# Patient Record
Sex: FEMALE | Race: OTHER | HISPANIC OR LATINO | URBAN - METROPOLITAN AREA
[De-identification: names, ages, dates, MRNs, and addresses within clinical notes are randomized per-mention and may not be internally consistent; named-entity substitution may affect disease eponyms.]

---

## 2018-10-04 ENCOUNTER — OFFICE VISIT (OUTPATIENT)
Dept: FAMILY MEDICINE CLINIC | Facility: CLINIC | Age: 33
End: 2018-10-04
Payer: COMMERCIAL

## 2018-10-04 VITALS
SYSTOLIC BLOOD PRESSURE: 140 MMHG | BODY MASS INDEX: 26.24 KG/M2 | HEART RATE: 80 BPM | DIASTOLIC BLOOD PRESSURE: 94 MMHG | TEMPERATURE: 99.1 F | HEIGHT: 61 IN | WEIGHT: 139 LBS

## 2018-10-04 DIAGNOSIS — Z00.00 WELL ADULT EXAM: Primary | ICD-10-CM

## 2018-10-04 DIAGNOSIS — Z13.6 SCREENING FOR CARDIOVASCULAR CONDITION: ICD-10-CM

## 2018-10-04 DIAGNOSIS — Z13.1 SCREENING FOR DIABETES MELLITUS: ICD-10-CM

## 2018-10-04 DIAGNOSIS — I10 ESSENTIAL HYPERTENSION: ICD-10-CM

## 2018-10-04 DIAGNOSIS — Z23 NEED FOR IMMUNIZATION AGAINST INFLUENZA: ICD-10-CM

## 2018-10-04 PROCEDURE — 99385 PREV VISIT NEW AGE 18-39: CPT | Performed by: NURSE PRACTITIONER

## 2018-10-04 RX ORDER — HYDROCHLOROTHIAZIDE 50 MG/1
50 TABLET ORAL DAILY
COMMUNITY
End: 2018-10-04 | Stop reason: CLARIF

## 2018-10-04 RX ORDER — LOSARTAN POTASSIUM 100 MG/1
100 TABLET ORAL DAILY
COMMUNITY
End: 2018-10-04 | Stop reason: CLARIF

## 2018-10-04 RX ORDER — LOSARTAN POTASSIUM AND HYDROCHLOROTHIAZIDE 25; 100 MG/1; MG/1
1 TABLET ORAL DAILY
Qty: 30 TABLET | Refills: 2 | Status: SHIPPED | OUTPATIENT
Start: 2018-10-04 | End: 2019-03-29 | Stop reason: SDUPTHER

## 2018-10-04 NOTE — PATIENT INSTRUCTIONS

## 2018-10-05 NOTE — PROGRESS NOTES
Assessment/Plan   Diagnoses and all orders for this visit:    Well adult exam    Essential hypertension  -     losartan-hydrochlorothiazide (HYZAAR) 100-25 MG per tablet; Take 1 tablet by mouth daily  -     Comprehensive metabolic panel; Future  -     UA (URINE) with reflex to Microscopic  -     Comprehensive metabolic panel    Screening for cardiovascular condition  -     CBC and differential; Future  -     Lipid Panel with Direct LDL reflex; Future  -     CBC and differential  -     Lipid Panel with Direct LDL reflex    Screening for diabetes mellitus  -     Hemoglobin A1C With EAG; Future  -     Hemoglobin A1C With EAG    Need for immunization against influenza  -     SYRINGE/SINGLE-DOSE VIAL: influenza vaccine, [id number], quadrivalent, 0 5 mL, preservative-free, for patients 3+ yr (FLUZONE)    Other orders  -     Discontinue: hydrochlorothiazide (HYDRODIURIL) 50 mg tablet; Take 50 mg by mouth daily  -     Discontinue: losartan (COZAAR) 100 MG tablet; Take 100 mg by mouth daily        Chief Complaint   Patient presents with    Well Check     New patient well check 28year old  Subjective   Patient ID: Belvin Leyden is a 28 y o  female  Vitals:    10/04/18 0920   BP: 140/94   Pulse: 80   Temp: 99 1 °F (37 3 °C)     Bernard Michele is here today to establish care and have a preventive well visit  She has a history of hypertension and is currently on losartan and HCTZ 50 mg which she admits that she often times forgets to take the 2 pills  Today will combine losartan 100 / 25 mg HCTZ ( HYZARR)  She reports she eats a healthy diet and exercises regularly with her   Does not currently see a dentist new to the area and will be obtaining 1 shortly  She has had a tubal ligation and reports no difficulties with her menstrual period currently  She has no health issues to discuss today  She was offered influenza immunization but she deferred    Will obtain preventive labs, and evaluate need for any additional medications  The following portions of the patient's history were reviewed and updated as appropriate: allergies, current medications, past family history, past medical history, past surgical history and problem list     Review of Systems   Constitutional: Negative  Negative for chills, fatigue and fever  HENT: Negative  Negative for congestion, sinus pressure and sore throat  Eyes: Negative  Respiratory: Negative  Negative for cough, chest tightness and shortness of breath  Cardiovascular: Negative  Negative for chest pain, palpitations and leg swelling  Gastrointestinal: Negative  Negative for blood in stool, constipation and diarrhea  Endocrine: Negative  Genitourinary: Negative  Musculoskeletal: Negative  Negative for arthralgias and myalgias  Skin: Negative  Allergic/Immunologic: Positive for environmental allergies  Neurological: Negative  Negative for dizziness and headaches  Hematological: Negative  Psychiatric/Behavioral: Negative  Objective     Physical Exam   Constitutional: She is oriented to person, place, and time  She appears well-developed and well-nourished  No distress  HENT:   Head: Normocephalic and atraumatic  Right Ear: External ear normal    Left Ear: External ear normal    Nose: Nose normal    Mouth/Throat: Oropharynx is clear and moist  No oropharyngeal exudate  Eyes: Pupils are equal, round, and reactive to light  Conjunctivae and EOM are normal  Right eye exhibits no discharge  Left eye exhibits no discharge  Neck: Normal range of motion  Neck supple  No thyromegaly present  Cardiovascular: Normal rate, regular rhythm, normal heart sounds and intact distal pulses  No murmur heard  Pulmonary/Chest: Effort normal and breath sounds normal  No respiratory distress  She has no wheezes  She has no rales  Abdominal: Soft  Bowel sounds are normal  She exhibits no distension and no mass  There is no tenderness   There is no rebound and no guarding  Musculoskeletal: Normal range of motion  She exhibits no edema or tenderness  Lymphadenopathy:     She has no cervical adenopathy  Neurological: She is alert and oriented to person, place, and time  She displays normal reflexes  No cranial nerve deficit  She exhibits normal muscle tone  Coordination normal    Skin: Skin is warm and dry  Capillary refill takes less than 2 seconds  No rash noted  She is not diaphoretic  No erythema  Psychiatric: She has a normal mood and affect  Her behavior is normal  Judgment and thought content normal    Nursing note and vitals reviewed

## 2018-10-12 ENCOUNTER — TELEPHONE (OUTPATIENT)
Dept: FAMILY MEDICINE CLINIC | Facility: CLINIC | Age: 33
End: 2018-10-12

## 2018-10-12 ENCOUNTER — OFFICE VISIT (OUTPATIENT)
Dept: URGENT CARE | Facility: CLINIC | Age: 33
End: 2018-10-12
Payer: COMMERCIAL

## 2018-10-12 VITALS
SYSTOLIC BLOOD PRESSURE: 128 MMHG | TEMPERATURE: 97.9 F | HEIGHT: 60 IN | BODY MASS INDEX: 27.29 KG/M2 | WEIGHT: 139 LBS | DIASTOLIC BLOOD PRESSURE: 84 MMHG | HEART RATE: 88 BPM | RESPIRATION RATE: 18 BRPM | OXYGEN SATURATION: 98 %

## 2018-10-12 DIAGNOSIS — J02.9 PHARYNGITIS, UNSPECIFIED ETIOLOGY: Primary | ICD-10-CM

## 2018-10-12 LAB — S PYO AG THROAT QL: NEGATIVE

## 2018-10-12 PROCEDURE — 99213 OFFICE O/P EST LOW 20 MIN: CPT | Performed by: PHYSICIAN ASSISTANT

## 2018-10-12 PROCEDURE — 87070 CULTURE OTHR SPECIMN AEROBIC: CPT | Performed by: PHYSICIAN ASSISTANT

## 2018-10-12 NOTE — PATIENT INSTRUCTIONS
Rapid strep negative, will send for culture and call if positive  Likely viral etiology given associated symptoms  Salt water gargles, chloraseptic spray, tylenol, and motrin for sore throat  Flonase and sudafed for sinus congestion  Delsym or robotussin for cough  Watch for any fevers  Follow up with your PCP for persistent symptoms  Go to the ER for any distress

## 2018-10-12 NOTE — PROGRESS NOTES
[facility]        NAME: Manjula Umanzor is a 28 y o  female  : 1985    MRN: [id number]  DATE: 2018  TIME: 6:41 PM    Assessment and Plan   Pharyngitis, unspecified etiology [J02 9]  1  Pharyngitis, unspecified etiology  POCT rapid strepA    Throat culture         Patient Instructions     Rapid strep negative, will send for culture and call if positive  Likely viral etiology given associated symptoms  Salt water gargles, chloraseptic spray, tylenol, and motrin for sore throat  Flonase and sudafed for sinus congestion  Delsym or robotussin for cough  Watch for any fevers  Follow up with PCP in 3-5 days  Proceed to  ER if symptoms worsen  Chief Complaint     Chief Complaint   Patient presents with    Sore Throat     Pt reports a sore throat that began on Monday  History of Present Illness       This is a 28year old female presenting for URI symptoms x 4 days  She reports cough, sinus congestion, right ear pain, and sore throat that is worsening  + subjective fevers  She called her PCP who told her that she needed to be swabbed for strep, so she came here  She has been using ibuprofen which does help the pain  No abdominal pain, nausea, vomiting, diarrhea  She does not speak fluent [place], though she does speak some  Her  is here who helps fill in the gaps in communication  They refused [facility]   Review of Systems   Review of Systems   Constitutional: Positive for chills, fatigue and fever (subjective)  HENT: Positive for congestion, ear pain, rhinorrhea, sinus pressure and sore throat  Negative for ear discharge  Respiratory: Positive for cough  Negative for shortness of breath  Gastrointestinal: Negative for abdominal pain, diarrhea, nausea and vomiting  Skin: Negative for rash           Current Medications       Current Outpatient Prescriptions:     losartan-hydrochlorothiazide (HYZAAR) 100-25 MG per tablet, Take 1 tablet by mouth daily, Disp: 30 tablet, Rfl: 2    Current Allergies     Allergies as of 10/12/2018 - Reviewed 10/12/2018   Allergen Reaction Noted    Amoxicillin Rash 10/12/2018            The following portions of the patient's history were reviewed and updated as appropriate: allergies, current medications, past family history, past medical history, past social history, past surgical history and problem list      Past Medical History:   Diagnosis Date    Hypertension        Past Surgical History:   Procedure Laterality Date     SECTION         No family history on file  Medications have been verified  Objective   /84   Pulse 88   Temp 97 9 °F (36 6 °C)   Resp 18   Ht 5' (1 524 m)   Wt 63 kg (139 lb)   LMP 10/04/2018   SpO2 98%   BMI 27 15 kg/m²        Physical Exam     Physical Exam   Constitutional: She appears well-developed and well-nourished  No distress  HENT:   Head: Normocephalic and atraumatic  Right Ear: Tympanic membrane, external ear and ear canal normal    Left Ear: Tympanic membrane, external ear and ear canal normal    Nose: Nose normal    Mouth/Throat: Uvula is midline and mucous membranes are normal  Posterior oropharyngeal edema (mild) and posterior oropharyngeal erythema present  No oropharyngeal exudate  Eyes: Pupils are equal, round, and reactive to light  Conjunctivae are normal    Neck: Normal range of motion  Neck supple  Cardiovascular: Normal rate, regular rhythm and normal heart sounds  Pulmonary/Chest: Effort normal and breath sounds normal  No respiratory distress  She has no decreased breath sounds  She has no wheezes  She has no rhonchi  She has no rales  Lymphadenopathy:     She has cervical adenopathy  Neurological: She is alert  Skin: Skin is warm and dry  She is not diaphoretic  Nursing note and vitals reviewed

## 2018-10-12 NOTE — TELEPHONE ENCOUNTER
I spoke with the patient's , Terrie Melendez, 10/12/2018 at 5:53 p m  after he contacted the answering service  The patient has been sick since 10/8/2018 with a sore throat and trouble swallowing  She can now barely talk and is a lot of pain  She is running low-grade fevers  She does have some runny nose and a cough  There is no nausea, vomiting, or diarrhea  I advised at this point since our office is currently closed that he should take [facility] to [facility] for further evaluation  He will take her there directly

## 2018-10-15 LAB — BACTERIA THROAT CULT: NORMAL

## 2018-11-10 LAB
ALBUMIN SERPL-MCNC: 4.6 G/DL (ref 3.6–5.1)
ALBUMIN/GLOB SERPL: 1.5 (CALC) (ref 1–2.5)
ALP SERPL-CCNC: 48 U/L (ref 33–115)
ALT SERPL-CCNC: 12 U/L (ref 6–29)
AST SERPL-CCNC: 14 U/L (ref 10–30)
BASOPHILS # BLD AUTO: 17 CELLS/UL (ref 0–200)
BASOPHILS NFR BLD AUTO: 0.2 %
BILIRUB SERPL-MCNC: 0.4 MG/DL (ref 0.2–1.2)
BUN SERPL-MCNC: 17 MG/DL (ref 7–25)
BUN/CREAT SERPL: NORMAL (CALC) (ref 6–22)
CALCIUM SERPL-MCNC: 9.5 MG/DL (ref 8.6–10.2)
CHLORIDE SERPL-SCNC: 103 MMOL/L (ref 98–110)
CHOLEST SERPL-MCNC: 232 MG/DL
CHOLEST/HDLC SERPL: 4.8 (CALC)
CO2 SERPL-SCNC: 25 MMOL/L (ref 20–32)
CREAT SERPL-MCNC: 0.87 MG/DL (ref 0.5–1.1)
EOSINOPHIL # BLD AUTO: 266 CELLS/UL (ref 15–500)
EOSINOPHIL NFR BLD AUTO: 3.2 %
ERYTHROCYTE [DISTWIDTH] IN BLOOD BY AUTOMATED COUNT: 12.7 % (ref 11–15)
EST. AVERAGE GLUCOSE BLD GHB EST-MCNC: 108 (CALC)
EST. AVERAGE GLUCOSE BLD GHB EST-SCNC: 6 (CALC)
GLOBULIN SER CALC-MCNC: 3 G/DL (CALC) (ref 1.9–3.7)
GLUCOSE SERPL-MCNC: 90 MG/DL (ref 65–99)
HBA1C MFR BLD: 5.4 % OF TOTAL HGB
HCT VFR BLD AUTO: 38.9 % (ref 35–45)
HDLC SERPL-MCNC: 48 MG/DL
HGB BLD-MCNC: 13.3 G/DL (ref 11.7–15.5)
LDLC SERPL CALC-MCNC: 150 MG/DL (CALC)
LYMPHOCYTES # BLD AUTO: 4922 CELLS/UL (ref 850–3900)
LYMPHOCYTES NFR BLD AUTO: 59.3 %
MCH RBC QN AUTO: 30.4 PG (ref 27–33)
MCHC RBC AUTO-ENTMCNC: 34.2 G/DL (ref 32–36)
MCV RBC AUTO: 88.8 FL (ref 80–100)
MONOCYTES # BLD AUTO: 598 CELLS/UL (ref 200–950)
MONOCYTES NFR BLD AUTO: 7.2 %
NEUTROPHILS # BLD AUTO: 2498 CELLS/UL (ref 1500–7800)
NEUTROPHILS NFR BLD AUTO: 30.1 %
NONHDLC SERPL-MCNC: 184 MG/DL (CALC)
PLATELET # BLD AUTO: 380 THOUSAND/UL (ref 140–400)
PMV BLD REES-ECKER: 10.3 FL (ref 7.5–12.5)
POTASSIUM SERPL-SCNC: 4.3 MMOL/L (ref 3.5–5.3)
PROT SERPL-MCNC: 7.6 G/DL (ref 6.1–8.1)
RBC # BLD AUTO: 4.38 MILLION/UL (ref 3.8–5.1)
SL AMB EGFR AFRICAN AMERICAN: 101 ML/MIN/1.73M2
SL AMB EGFR NON AFRICAN AMERICAN: 88 ML/MIN/1.73M2
SODIUM SERPL-SCNC: 140 MMOL/L (ref 135–146)
TRIGL SERPL-MCNC: 204 MG/DL
WBC # BLD AUTO: 8.3 THOUSAND/UL (ref 3.8–10.8)

## 2018-11-21 ENCOUNTER — TELEPHONE (OUTPATIENT)
Dept: FAMILY MEDICINE CLINIC | Facility: CLINIC | Age: 33
End: 2018-11-21

## 2018-11-23 ENCOUNTER — OFFICE VISIT (OUTPATIENT)
Dept: FAMILY MEDICINE CLINIC | Facility: CLINIC | Age: 33
End: 2018-11-23
Payer: COMMERCIAL

## 2018-11-23 VITALS
HEIGHT: 60 IN | SYSTOLIC BLOOD PRESSURE: 120 MMHG | WEIGHT: 141 LBS | TEMPERATURE: 98.6 F | DIASTOLIC BLOOD PRESSURE: 82 MMHG | HEART RATE: 56 BPM | BODY MASS INDEX: 27.68 KG/M2

## 2018-11-23 DIAGNOSIS — R79.89 ABNORMAL CBC: Primary | ICD-10-CM

## 2018-11-23 DIAGNOSIS — E78.2 MIXED HYPERLIPIDEMIA: ICD-10-CM

## 2018-11-23 PROCEDURE — 99213 OFFICE O/P EST LOW 20 MIN: CPT | Performed by: NURSE PRACTITIONER

## 2018-11-23 PROCEDURE — 1036F TOBACCO NON-USER: CPT | Performed by: NURSE PRACTITIONER

## 2018-11-23 PROCEDURE — 3008F BODY MASS INDEX DOCD: CPT | Performed by: NURSE PRACTITIONER

## 2018-11-23 RX ORDER — ATORVASTATIN CALCIUM 10 MG/1
10 TABLET, FILM COATED ORAL DAILY
Qty: 30 TABLET | Refills: 1 | Status: SHIPPED | OUTPATIENT
Start: 2018-11-23 | End: 2019-03-14 | Stop reason: ALTCHOICE

## 2018-11-23 NOTE — PROGRESS NOTES
Assessment/Plan   Diagnoses and all orders for this visit:    Abnormal CBC  -     CBC and differential; Future  -     CBC and differential    Mixed hyperlipidemia  -     Lipid Panel with Direct LDL reflex; Future  -     Lipid Panel with Direct LDL reflex  -     atorvastatin (LIPITOR) 10 mg tablet; Take 1 tablet (10 mg total) by mouth daily  -     Comprehensive metabolic panel; Future  -     Comprehensive metabolic panel        Chief Complaint   Patient presents with    Follow-up     to discuss labs       Subjective   Patient ID: Sofy Neff is a 35 y o  female  Vitals:    11/23/18 1106   BP: 120/82   Pulse: 56   Temp: 98 6 °F (37 °C)     Patient here today to discuss labs  Elevated cholesterol,  And LDL of 150 will begin atorvastatin and retest in  3 months along with liver enzymes  Side effects of medications discussed  Appropriate time to take medication discussed  Slight elevation of lymphs on CBC, pt had viral infection at the time  Will recheck CBC in another 2 weeks and evaluate the need for further monitoring        The following portions of the patient's history were reviewed and updated as appropriate: allergies, current medications, past family history, past medical history, past surgical history and problem list     Review of Systems   Constitutional: Negative  Negative for fatigue and fever  HENT: Negative  Eyes: Negative  Respiratory: Negative  Cardiovascular: Negative  Gastrointestinal: Negative  Endocrine: Negative  Genitourinary: Negative  Musculoskeletal: Negative  Skin: Negative  Allergic/Immunologic: Negative  Neurological: Negative  Hematological: Negative  Psychiatric/Behavioral: Negative  Objective     Physical Exam   Constitutional: She is oriented to person, place, and time  She appears well-developed and well-nourished  No distress  HENT:   Head: Normocephalic and atraumatic     Right Ear: External ear normal    Left Ear: External ear normal    Nose: Nose normal    Mouth/Throat: Oropharynx is clear and moist  No oropharyngeal exudate  Eyes: Pupils are equal, round, and reactive to light  Conjunctivae are normal    Neck: Normal range of motion  Neck supple  No thyromegaly present  Cardiovascular: Normal rate and regular rhythm  Pulmonary/Chest: Effort normal and breath sounds normal    Abdominal: Soft  Bowel sounds are normal    Musculoskeletal: Normal range of motion  She exhibits no edema, tenderness or deformity  Lymphadenopathy:     She has no cervical adenopathy  Neurological: She is alert and oriented to person, place, and time  Skin: Skin is warm and dry  Capillary refill takes less than 2 seconds  She is not diaphoretic  Psychiatric: She has a normal mood and affect  Her behavior is normal  Judgment and thought content normal    Nursing note and vitals reviewed  Allergies   Allergen Reactions    Amoxicillin Rash     There is no problem list on file for this patient  Current Outpatient Prescriptions:     losartan-hydrochlorothiazide (HYZAAR) 100-25 MG per tablet, Take 1 tablet by mouth daily, Disp: 30 tablet, Rfl: 2    atorvastatin (LIPITOR) 10 mg tablet, Take 1 tablet (10 mg total) by mouth daily, Disp: 30 tablet, Rfl: 1  Social History     Social History    Marital status: /Civil Union     Spouse name: N/A    Number of children: N/A    Years of education: N/A     Occupational History    Not on file  Social History Main Topics    Smoking status: Former Smoker    Smokeless tobacco: Never Used    Alcohol use Yes      Comment: Social     Drug use: Unknown    Sexual activity: Not on file     Other Topics Concern    Not on file     Social History Narrative    Nonsmoker - as per [employer]      No family history on file

## 2018-11-23 NOTE — PATIENT INSTRUCTIONS
1  Repeat CBC next week  2  Repeat lipid profile in three months  3   Begin medication of atorvastatin in PM with dinner, call if any leg pain

## 2018-12-05 ENCOUNTER — TELEPHONE (OUTPATIENT)
Dept: FAMILY MEDICINE CLINIC | Facility: CLINIC | Age: 33
End: 2018-12-05

## 2018-12-05 DIAGNOSIS — D72.820 LYMPHOCYTOSIS: Primary | ICD-10-CM

## 2018-12-05 LAB
BASOPHILS # BLD AUTO: 38 CELLS/UL (ref 0–200)
BASOPHILS NFR BLD AUTO: 0.4 %
EOSINOPHIL # BLD AUTO: 228 CELLS/UL (ref 15–500)
EOSINOPHIL NFR BLD AUTO: 2.4 %
ERYTHROCYTE [DISTWIDTH] IN BLOOD BY AUTOMATED COUNT: 12.9 % (ref 11–15)
HCT VFR BLD AUTO: 37.2 % (ref 35–45)
HGB BLD-MCNC: 12.5 G/DL (ref 11.7–15.5)
LYMPHOCYTES # BLD AUTO: 4874 CELLS/UL (ref 850–3900)
LYMPHOCYTES NFR BLD AUTO: 51.3 %
MCH RBC QN AUTO: 30.9 PG (ref 27–33)
MCHC RBC AUTO-ENTMCNC: 33.6 G/DL (ref 32–36)
MCV RBC AUTO: 92.1 FL (ref 80–100)
MONOCYTES # BLD AUTO: 542 CELLS/UL (ref 200–950)
MONOCYTES NFR BLD AUTO: 5.7 %
NEUTROPHILS # BLD AUTO: 3819 CELLS/UL (ref 1500–7800)
NEUTROPHILS NFR BLD AUTO: 40.2 %
PLATELET # BLD AUTO: 385 THOUSAND/UL (ref 140–400)
PMV BLD REES-ECKER: 10.4 FL (ref 7.5–12.5)
RBC # BLD AUTO: 4.04 MILLION/UL (ref 3.8–5.1)
WBC # BLD AUTO: 9.5 THOUSAND/UL (ref 3.8–10.8)

## 2018-12-05 NOTE — PROGRESS NOTES
Patient has elevated lymphs on CBC,  Initial thought was viral infection due to sore throat but they remain elevated post infection   Sending to hematology for further evaluation

## 2018-12-24 ENCOUNTER — CONSULT (OUTPATIENT)
Dept: HEMATOLOGY ONCOLOGY | Facility: HOSPITAL | Age: 33
End: 2018-12-24
Payer: COMMERCIAL

## 2018-12-24 VITALS
OXYGEN SATURATION: 99 % | RESPIRATION RATE: 16 BRPM | SYSTOLIC BLOOD PRESSURE: 142 MMHG | HEART RATE: 92 BPM | DIASTOLIC BLOOD PRESSURE: 82 MMHG | TEMPERATURE: 98.1 F | HEIGHT: 61 IN | BODY MASS INDEX: 27 KG/M2 | WEIGHT: 143 LBS

## 2018-12-24 DIAGNOSIS — R10.12 LEFT UPPER QUADRANT PAIN: ICD-10-CM

## 2018-12-24 DIAGNOSIS — D72.820 LYMPHOCYTOSIS: Primary | ICD-10-CM

## 2018-12-24 PROCEDURE — 99245 OFF/OP CONSLTJ NEW/EST HI 55: CPT | Performed by: INTERNAL MEDICINE

## 2018-12-24 NOTE — PROGRESS NOTES
Oncology Outpatient Consult Note  Anjali Sharp 35 y o  female MRN: @ Encounter: [id number]        Date:  2018        CC:  Mild lymphocytosis with normal CBC      HPI:  Anjali Sharp is seen for initial consultation 2018 regarding Mild lymphocytosis  She has had 2 blood test done within a month of each other which showed a normal white count and normal differential apart from a slightly elevated lymphocyte count  The patient herself is at baseline  She did have a sore throat at the time of the first blood test She states her throat is better  Really denies any drenching night sweats, weight loss, nausea, vomiting  Denies any B symptoms  Has lost a little weight but this was intentional  She states she feels a slight nodularity in her left upper quadrant which is sometimes uncomfortable  It seems like nodularity in the subcutaneous fat  Denies any other complaints  The rest of her 14 point review of systems today was negative  Test Results:    Imaging: No results found  Labs:   Lab Results   Component Value Date    WBC 9 5 2018    HGB 12 5 2018    HCT 37 2 2018    MCV 92 1 2018     2018     Lab Results   Component Value Date    K 4 3 2018     2018    CO2 25 2018    BUN 17 2018    CALCIUM 9 5 2018    ALKPHOS 48 2018           ROS: As stated in history of present illness otherwise her 14 point review of systems today was negative  Active Problems: Lymphocytosis    Past Medical History:   Past Medical History:   Diagnosis Date    Hypertension        Surgical History:   Past Surgical History:   Procedure Laterality Date     SECTION         Family History:  Noncontributory        Social History:   Social History     Social History    Marital status: /Civil Union     Spouse name: N/A    Number of children: N/A    Years of education: N/A     Occupational History    Not on file       Social History Main Topics    Smoking status: Former Smoker    Smokeless tobacco: Never Used    Alcohol use Yes      Comment: Social     Drug use: Unknown    Sexual activity: Not on file     Other Topics Concern    Not on file     Social History Narrative    Nonsmoker - as per [employer]        Current Medications:   Current Outpatient Prescriptions   Medication Sig Dispense Refill    atorvastatin (LIPITOR) 10 mg tablet Take 1 tablet (10 mg total) by mouth daily 30 tablet 1    losartan-hydrochlorothiazide (HYZAAR) 100-25 MG per tablet Take 1 tablet by mouth daily 30 tablet 2     No current facility-administered medications for this visit  Allergies: Allergies   Allergen Reactions    Amoxicillin Rash         Physical Exam:    Body surface area is 1 63 meters squared  Wt Readings from Last 3 Encounters:   12/24/18 64 9 kg (143 lb)   11/23/18 64 kg (141 lb)   10/12/18 63 kg (139 lb)        Temp Readings from Last 3 Encounters:   12/24/18 98 1 °F (36 7 °C) (Tympanic)   11/23/18 98 6 °F (37 °C) (Tympanic)   10/12/18 97 9 °F (36 6 °C)        BP Readings from Last 3 Encounters:   12/24/18 142/82   11/23/18 120/82   10/12/18 128/84         Pulse Readings from Last 3 Encounters:   12/24/18 92   11/23/18 56   10/12/18 88       Physical Exam     Constitutional   General appearance: No acute distress, well appearing and well nourished  Eyes   Conjunctiva and lids: No swelling, erythema or discharge  Pupils and irises: Equal, round and reactive to light  Ears, Nose, Mouth, and Throat   External inspection of ears and nose: Normal     Nasal mucosa, septum, and turbinates: Normal without edema or erythema  Oropharynx: Normal with no erythema, edema, exudate or lesions  Pulmonary   Respiratory effort: No increased work of breathing or signs of respiratory distress  Auscultation of lungs: Clear to auscultation  Cardiovascular   Palpation of heart: Normal PMI, no thrills      Auscultation of heart: Normal rate and rhythm, normal S1 and S2, without murmurs  Examination of extremities for edema and/or varicosities: Normal     Carotid pulses: Normal     Abdomen   Abdomen: Non-tender, no masses  Liver and spleen: No hepatomegaly or splenomegaly  Lymphatic   Palpation of lymph nodes in neck: No lymphadenopathy  Musculoskeletal   Gait and station: Normal     Digits and nails: Normal without clubbing or cyanosis  Inspection/palpation of joints, bones, and muscles: Normal     Skin   Skin and subcutaneous tissue: Normal without rashes or lesions  Neurologic   Cranial nerves: Cranial nerves 2-12 intact  Sensation: No sensory loss  Psychiatric   Orientation to person, place, and time: Normal     Mood and affect: Normal           Assessment/ Plan: This is a pleasant 71-year-old female who was referred to see us for lymphocytosis a few weeks after she had an upper respiratory tract infection  She does complain of some left upper quadrant discomfort/nodularity in the subcutaneous fat  I palpate no lymphadenopathy today  I will order an ultrasound of the left upper quadrant and abdomen to make sure she does not have any pathology behind her symptoms  It sounds like she is exercising more than usual and this could be musculoskeletal  I will do a flow cytometry and a repeat CBC in a month  I'll see her back with results  If her lymphocytosis persist we may do further workup  The patient and her  are in agreement with the plan  I'll see her back in a month with the results of the ultrasound and the blood work to include a flow cytometry on the peripheral blood  She does not have any B symptoms today  Goals and Barriers:  Current Goal: Prolong Survival from Elevated lymphocyte count  Barriers: None  Patient's Capacity to Self Care:  Patient  able to self care      Portions of the record may have been created with voice recognition software   Occasional wrong word or "sound a like" substitutions may have occurred due to the inherent limitations of voice recognition software   Read the chart carefully and recognize, using context, where substitutions have occurred

## 2018-12-24 NOTE — LETTER
December 24, 2018     Macy Brown, [address] [facility] [address] [place] [address]    Patient: Sofy Neff   YOB: 1985   Date of Visit: 12/24/2018       Dear Dr Desmond Roland: Thank you for referring Sofy Neff to me for evaluation  Below are my notes for this consultation  If you have questions, please do not hesitate to call me  I look forward to following your patient along with you  Sincerely,        Pasha Raymundo MD        CC: No Recipients  Pasha Raymundo MD  12/24/2018 10:51 AM  Incomplete     Oncology Outpatient Consult Note  Sfoy Neff 35 y o  female MRN: @ Encounter: [id number]        Date:  12/24/2018        CC:  Mild lymphocytosis with normal CBC      HPI:  Sofy Neff is seen for initial consultation 12/24/2018 regarding Mild lymphocytosis  She has had 2 blood test done within a month of each other which showed a normal white count and normal differential apart from a slightly elevated lymphocyte count  The patient herself is at baseline  She did have a sore throat at the time of the first blood test She states her throat is better  Really denies any drenching night sweats, weight loss, nausea, vomiting  Denies any B symptoms  Has lost a little weight but this was intentional  She states she feels a slight nodularity in her left upper quadrant which is sometimes uncomfortable  It seems like nodularity in the subcutaneous fat  Denies any other complaints  The rest of her 14 point review of systems today was negative  Test Results:    Imaging: No results found      Labs:   Lab Results   Component Value Date    WBC 9 5 12/04/2018    HGB 12 5 12/04/2018    HCT 37 2 12/04/2018    MCV 92 1 12/04/2018     12/04/2018     Lab Results   Component Value Date    K 4 3 11/09/2018     11/09/2018    CO2 25 11/09/2018    BUN 17 11/09/2018    CALCIUM 9 5 11/09/2018    ALKPHOS 48 11/09/2018           ROS: As stated in history of present illness otherwise her 14 point review of systems today was negative  Active Problems: Lymphocytosis    Past Medical History:   Past Medical History:   Diagnosis Date    Hypertension        Surgical History:   Past Surgical History:   Procedure Laterality Date     SECTION         Family History:  Noncontributory        Social History:   Social History     Social History    Marital status: /Civil Union     Spouse name: N/A    Number of children: N/A    Years of education: N/A     Occupational History    Not on file  Social History Main Topics    Smoking status: Former Smoker    Smokeless tobacco: Never Used    Alcohol use Yes      Comment: Social     Drug use: Unknown    Sexual activity: Not on file     Other Topics Concern    Not on file     Social History Narrative    Nonsmoker - as per [employer]        Current Medications:   Current Outpatient Prescriptions   Medication Sig Dispense Refill    atorvastatin (LIPITOR) 10 mg tablet Take 1 tablet (10 mg total) by mouth daily 30 tablet 1    losartan-hydrochlorothiazide (HYZAAR) 100-25 MG per tablet Take 1 tablet by mouth daily 30 tablet 2     No current facility-administered medications for this visit  Allergies: Allergies   Allergen Reactions    Amoxicillin Rash         Physical Exam:    Body surface area is 1 63 meters squared  Wt Readings from Last 3 Encounters:   18 64 9 kg (143 lb)   18 64 kg (141 lb)   10/12/18 63 kg (139 lb)        Temp Readings from Last 3 Encounters:   18 98 1 °F (36 7 °C) (Tympanic)   18 98 6 °F (37 °C) (Tympanic)   10/12/18 97 9 °F (36 6 °C)        BP Readings from Last 3 Encounters:   18 142/82   18 120/82   10/12/18 128/84         Pulse Readings from Last 3 Encounters:   18 92   18 56   10/12/18 88       Physical Exam     Constitutional   General appearance: No acute distress, well appearing and well nourished  Eyes   Conjunctiva and lids: No swelling, erythema or discharge  Pupils and irises: Equal, round and reactive to light  Ears, Nose, Mouth, and Throat   External inspection of ears and nose: Normal     Nasal mucosa, septum, and turbinates: Normal without edema or erythema  Oropharynx: Normal with no erythema, edema, exudate or lesions  Pulmonary   Respiratory effort: No increased work of breathing or signs of respiratory distress  Auscultation of lungs: Clear to auscultation  Cardiovascular   Palpation of heart: Normal PMI, no thrills  Auscultation of heart: Normal rate and rhythm, normal S1 and S2, without murmurs  Examination of extremities for edema and/or varicosities: Normal     Carotid pulses: Normal     Abdomen   Abdomen: Non-tender, no masses  Liver and spleen: No hepatomegaly or splenomegaly  Lymphatic   Palpation of lymph nodes in neck: No lymphadenopathy  Musculoskeletal   Gait and station: Normal     Digits and nails: Normal without clubbing or cyanosis  Inspection/palpation of joints, bones, and muscles: Normal     Skin   Skin and subcutaneous tissue: Normal without rashes or lesions  Neurologic   Cranial nerves: Cranial nerves 2-12 intact  Sensation: No sensory loss  Psychiatric   Orientation to person, place, and time: Normal     Mood and affect: Normal           Assessment/ Plan: This is a pleasant 25-year-old female who was referred to see us for lymphocytosis a few weeks after she had an upper respiratory tract infection  She does complain of some left upper quadrant discomfort/nodularity    Goals and Barriers:  Current Goal: ***  Prolong Survival from Cancer  Barriers: None  Patient's Capacity to Self Care:  Patient *** able to self care      Portions of the record may have been created with voice recognition software   Occasional wrong word or "sound a like" substitutions may have occurred due to the inherent limitations of voice recognition software   Read the chart carefully and recognize, using context, where substitutions have occurred

## 2018-12-24 NOTE — LETTER
December 24, 2018     Driss Bhatti, [address] [place] [address] [address] [place] [address]    Patient: Kade Siddiqui   YOB: 1985   Date of Visit: 12/24/2018       Dear Dr [place]: Thank you for referring Kade Siddiqui to me for evaluation  Below are my notes for this consultation  If you have questions, please do not hesitate to call me  I look forward to following your patient along with you  Sincerely,        Bailee Jones MD        CC: No Recipients  Bailee Jones MD  12/24/2018 10:54 AM  Sign at close encounter     Oncology Outpatient Consult Note  Kade Siddiqui 35 y o  female MRN: @ Encounter: [id number]        Date:  12/24/2018        CC:  Mild lymphocytosis with normal CBC      HPI:  Kade Siddiqui is seen for initial consultation 12/24/2018 regarding Mild lymphocytosis  She has had 2 blood test done within a month of each other which showed a normal white count and normal differential apart from a slightly elevated lymphocyte count  The patient herself is at baseline  She did have a sore throat at the time of the first blood test She states her throat is better  Really denies any drenching night sweats, weight loss, nausea, vomiting  Denies any B symptoms  Has lost a little weight but this was intentional  She states she feels a slight nodularity in her left upper quadrant which is sometimes uncomfortable  It seems like nodularity in the subcutaneous fat  Denies any other complaints  The rest of her 14 point review of systems today was negative  Test Results:    Imaging: No results found      Labs:   Lab Results   Component Value Date    WBC 9 5 12/04/2018    HGB 12 5 12/04/2018    HCT 37 2 12/04/2018    MCV 92 1 12/04/2018     12/04/2018     Lab Results   Component Value Date    K 4 3 11/09/2018     11/09/2018    CO2 25 11/09/2018    BUN 17 11/09/2018    CALCIUM 9 5 11/09/2018    ALKPHOS 48 11/09/2018           ROS: As stated in history of present illness otherwise her 15 point review of systems today was negative  Active Problems: Lymphocytosis    Past Medical History:   Past Medical History:   Diagnosis Date    Hypertension        Surgical History:   Past Surgical History:   Procedure Laterality Date     SECTION         Family History:  Noncontributory        Social History:   Social History     Social History    Marital status: /Civil Union     Spouse name: N/A    Number of children: N/A    Years of education: N/A     Occupational History    Not on file  Social History Main Topics    Smoking status: Former Smoker    Smokeless tobacco: Never Used    Alcohol use Yes      Comment: Social     Drug use: Unknown    Sexual activity: Not on file     Other Topics Concern    Not on file     Social History Narrative    Nonsmoker - as per [employer]        Current Medications:   Current Outpatient Prescriptions   Medication Sig Dispense Refill    atorvastatin (LIPITOR) 10 mg tablet Take 1 tablet (10 mg total) by mouth daily 30 tablet 1    losartan-hydrochlorothiazide (HYZAAR) 100-25 MG per tablet Take 1 tablet by mouth daily 30 tablet 2     No current facility-administered medications for this visit  Allergies: Allergies   Allergen Reactions    Amoxicillin Rash         Physical Exam:    Body surface area is 1 63 meters squared  Wt Readings from Last 3 Encounters:   18 64 9 kg (143 lb)   18 64 kg (141 lb)   10/12/18 63 kg (139 lb)        Temp Readings from Last 3 Encounters:   18 98 1 °F (36 7 °C) (Tympanic)   18 98 6 °F (37 °C) (Tympanic)   10/12/18 97 9 °F (36 6 °C)        BP Readings from Last 3 Encounters:   18 142/82   18 120/82   10/12/18 128/84         Pulse Readings from Last 3 Encounters:   18 92   18 56   10/12/18 88       Physical Exam     Constitutional   General appearance: No acute distress, well appearing and well nourished      Eyes   Conjunctiva and lids: No swelling, erythema or discharge  Pupils and irises: Equal, round and reactive to light  Ears, Nose, Mouth, and Throat   External inspection of ears and nose: Normal     Nasal mucosa, septum, and turbinates: Normal without edema or erythema  Oropharynx: Normal with no erythema, edema, exudate or lesions  Pulmonary   Respiratory effort: No increased work of breathing or signs of respiratory distress  Auscultation of lungs: Clear to auscultation  Cardiovascular   Palpation of heart: Normal PMI, no thrills  Auscultation of heart: Normal rate and rhythm, normal S1 and S2, without murmurs  Examination of extremities for edema and/or varicosities: Normal     Carotid pulses: Normal     Abdomen   Abdomen: Non-tender, no masses  Liver and spleen: No hepatomegaly or splenomegaly  Lymphatic   Palpation of lymph nodes in neck: No lymphadenopathy  Musculoskeletal   Gait and station: Normal     Digits and nails: Normal without clubbing or cyanosis  Inspection/palpation of joints, bones, and muscles: Normal     Skin   Skin and subcutaneous tissue: Normal without rashes or lesions  Neurologic   Cranial nerves: Cranial nerves 2-12 intact  Sensation: No sensory loss  Psychiatric   Orientation to person, place, and time: Normal     Mood and affect: Normal           Assessment/ Plan: This is a pleasant 66-year-old female who was referred to see us for lymphocytosis a few weeks after she had an upper respiratory tract infection  She does complain of some left upper quadrant discomfort/nodularity in the subcutaneous fat  I palpate no lymphadenopathy today  I will order an ultrasound of the left upper quadrant and abdomen to make sure she does not have any pathology behind her symptoms  It sounds like she is exercising more than usual and this could be musculoskeletal  I will do a flow cytometry and a repeat CBC in a month  I'll see her back with results  If her lymphocytosis persist we may do further workup  The patient and her  are in agreement with the plan  I'll see her back in a month with the results of the ultrasound and the blood work to include a flow cytometry on the peripheral blood  She does not have any B symptoms today  Goals and Barriers:  Current Goal: Prolong Survival from Elevated lymphocyte count  Barriers: None  Patient's Capacity to Self Care:  Patient  able to self care  Portions of the record may have been created with voice recognition software   Occasional wrong word or "sound a like" substitutions may have occurred due to the inherent limitations of voice recognition software   Read the chart carefully and recognize, using context, where substitutions have occurred

## 2019-01-17 LAB
ALBUMIN SERPL-MCNC: 4.6 G/DL (ref 3.6–5.1)
ALBUMIN/GLOB SERPL: 1.6 (CALC) (ref 1–2.5)
ALP SERPL-CCNC: 40 U/L (ref 33–115)
ALT SERPL-CCNC: 13 U/L (ref 6–29)
AST SERPL-CCNC: 14 U/L (ref 10–30)
BASOPHILS # BLD AUTO: 43 CELLS/UL (ref 0–200)
BASOPHILS NFR BLD AUTO: 0.5 %
BILIRUB SERPL-MCNC: 0.4 MG/DL (ref 0.2–1.2)
BUN SERPL-MCNC: 12 MG/DL (ref 7–25)
BUN/CREAT SERPL: NORMAL (CALC) (ref 6–22)
CALCIUM SERPL-MCNC: 9.4 MG/DL (ref 8.6–10.2)
CHLORIDE SERPL-SCNC: 101 MMOL/L (ref 98–110)
CLINICAL INFO: NORMAL
CO2 SERPL-SCNC: 29 MMOL/L (ref 20–32)
CREAT SERPL-MCNC: 0.76 MG/DL (ref 0.5–1.1)
EOSINOPHIL # BLD AUTO: 230 CELLS/UL (ref 15–500)
EOSINOPHIL NFR BLD AUTO: 2.7 %
ERYTHROCYTE [DISTWIDTH] IN BLOOD BY AUTOMATED COUNT: 12.5 % (ref 11–15)
EVENTS COUNTED SPEC: 22
GLOBULIN SER CALC-MCNC: 2.8 G/DL (CALC) (ref 1.9–3.7)
GLUCOSE SERPL-MCNC: 94 MG/DL (ref 65–99)
HCT VFR BLD AUTO: 40.3 % (ref 35–45)
HGB BLD-MCNC: 13.6 G/DL (ref 11.7–15.5)
LYMPHOCYTES # BLD AUTO: 3987 CELLS/UL (ref 850–3900)
LYMPHOCYTES NFR BLD AUTO: 46.9 %
MCH RBC QN AUTO: 30.7 PG (ref 27–33)
MCHC RBC AUTO-ENTMCNC: 33.7 G/DL (ref 32–36)
MCV RBC AUTO: 91 FL (ref 80–100)
MONOCYTES # BLD AUTO: 519 CELLS/UL (ref 200–950)
MONOCYTES NFR BLD AUTO: 6.1 %
NEUTROPHILS # BLD AUTO: 3723 CELLS/UL (ref 1500–7800)
NEUTROPHILS NFR BLD AUTO: 43.8 %
PATHOLOGIST DATE: NORMAL
PLATELET # BLD AUTO: 361 THOUSAND/UL (ref 140–400)
PMV BLD REES-ECKER: 10.5 FL (ref 7.5–12.5)
POTASSIUM SERPL-SCNC: 4.4 MMOL/L (ref 3.5–5.3)
PROT SERPL-MCNC: 7.4 G/DL (ref 6.1–8.1)
RBC # BLD AUTO: 4.43 MILLION/UL (ref 3.8–5.1)
SL AMB EGFR AFRICAN AMERICAN: 119 ML/MIN/1.73M2
SL AMB EGFR NON AFRICAN AMERICAN: 103 ML/MIN/1.73M2
SODIUM SERPL-SCNC: 138 MMOL/L (ref 135–146)
SPECIMEN SOURCE: NORMAL
VIABLE CELLS NFR SPEC: NORMAL %
WBC # BLD AUTO: 8.5 THOUSAND/UL (ref 3.8–10.8)

## 2019-01-18 ENCOUNTER — HOSPITAL ENCOUNTER (OUTPATIENT)
Dept: ULTRASOUND IMAGING | Facility: CLINIC | Age: 34
Discharge: HOME | End: 2019-01-18
Attending: INTERNAL MEDICINE
Payer: COMMERCIAL

## 2019-01-18 DIAGNOSIS — D72.820 LYMPHOCYTOSIS: ICD-10-CM

## 2019-01-18 DIAGNOSIS — R10.12 LEFT UPPER QUADRANT PAIN: ICD-10-CM

## 2019-01-18 PROCEDURE — 76700 US EXAM ABDOM COMPLETE: CPT

## 2019-01-23 ENCOUNTER — OFFICE VISIT (OUTPATIENT)
Dept: HEMATOLOGY ONCOLOGY | Facility: HOSPITAL | Age: 34
End: 2019-01-23
Payer: COMMERCIAL

## 2019-01-23 VITALS
HEART RATE: 90 BPM | WEIGHT: 146.4 LBS | TEMPERATURE: 98.7 F | BODY MASS INDEX: 28.74 KG/M2 | SYSTOLIC BLOOD PRESSURE: 122 MMHG | HEIGHT: 60 IN | OXYGEN SATURATION: 99 % | RESPIRATION RATE: 18 BRPM | DIASTOLIC BLOOD PRESSURE: 90 MMHG

## 2019-01-23 DIAGNOSIS — D72.820 LYMPHOCYTOSIS: Primary | ICD-10-CM

## 2019-01-23 PROCEDURE — 99214 OFFICE O/P EST MOD 30 MIN: CPT | Performed by: INTERNAL MEDICINE

## 2019-01-23 RX ORDER — TERBINAFINE HYDROCHLORIDE 250 MG/1
TABLET ORAL
Refills: 0 | COMMUNITY
Start: 2018-12-27 | End: 2019-08-29 | Stop reason: ALTCHOICE

## 2019-01-23 NOTE — PROGRESS NOTES
Hematology/Oncology Outpatient Follow- up Note  Sandra Ron 35 y o  female MRN: @ Encounter: [id number]        Date:  1/23/2019    Presenting Complaint/Diagnosis : Mild lymphocytosis    HPI:    Anabella Larkin is seen for initial consultation 12/24/2018 regarding Mild lymphocytosis  She has had 2 blood test done within a month of each other which showed a normal white count and normal differential apart from a slightly elevated lymphocyte count  Previous Hematologic/ Oncologic History:    Workup    Current Hematologic/ Oncologic Treatment:      Workup    Interval History:      The patient returns for follow-up visit  When she had last seen me she had had 2 blood tests which showed mild lymphocytosis  by a month  I repeated the blood work and also had a flow cytometry  The flow cytometry was negative  Lymphocytosis is improving and is almost back down to normal  The patient is asymptomatic  I also had an ultrasound of her abdomen/left upper quadrant done because of some discomfort she had which was negative  She is otherwise asymptomatic  Denies any nausea denies any vomiting denies any diarrhea  The rest of her 14 point review of systems today was negative  Test Results:    Imaging: Us Abdomen Complete    Result Date: 1/18/2019  Narrative: ABDOMEN ULTRASOUND, COMPLETE INDICATION:   D72 820: Lymphocytosis (symptomatic) R10 12: Left upper quadrant pain  Palpable abnormality in the subcostal region of the left upper abdomen  COMPARISON: None TECHNIQUE:   Real-time ultrasound of the abdomen was performed with a curvilinear transducer with both volumetric sweeps and still imaging techniques  Targeted ultrasound of the anterior left upper abdominal wall was performed  FINDINGS: PANCREAS:  Visualized portions of the pancreas are within normal limits  AORTA AND IVC:  Visualized portions are normal for patient age  LIVER: Size:  Within normal range    The liver measures 14 8 cm in the midclavicular line  Contour:  Surface contour is smooth  Parenchyma:  Echogenicity and echotexture are within normal limits  No evidence of suspicious mass  Limited imaging of the main portal vein shows it to be patent and hepatopetal  BILIARY: The gallbladder is normal in caliber  No wall thickening or pericholecystic fluid  No stones or sludge identified  No sonographic Bueno's sign  No intrahepatic biliary dilatation  CBD measures 4 mm  No choledocholithiasis  KIDNEY: Right kidney measures 10 2 cm  Within normal limits  Left kidney measures 9 6 cm  Within normal limits  SPLEEN: Measures 7 8 cm  Within normal limits  ASCITES:  None  OTHER: Targeted ultrasound of the body wall at the site of palpable abnormality along the left costal margin in the left upper abdomen reveals no suspicious solid or cystic mass and no sonographic abnormality to definitively account for the reported palpable abnormality  Impression: Normal examination  No sonographic abnormality at the site of reported palpable finding  Workstation performed: [id number]       Labs:   Lab Results   Component Value Date    WBC 8 5 2019    HGB 13 6 2019    HCT 40 3 2019    MCV 91 0 2019     2019     Lab Results   Component Value Date    K 4 4 2019     2019    CO2 29 2019    BUN 12 2019    CALCIUM 9 4 2019    ALKPHOS 40 2019       ROS: As stated in the history of present illness otherwise his 14 point review of systems today was negative  Active Problems: There is no problem list on file for this patient        Past Medical History:   Past Medical History:   Diagnosis Date    Hypertension        Surgical History:   Past Surgical History:   Procedure Laterality Date     SECTION         Family History:    Family History   Problem Relation Age of Onset    Hypertension Mother     Diabetes Mother     Hypertension Father     Diabetes Father        Cancer-related family history is not on file  Social History:   Social History     Social History    Marital status: /Civil Union     Spouse name: N/A    Number of children: N/A    Years of education: N/A     Occupational History    Not on file  Social History Main Topics    Smoking status: Former Smoker    Smokeless tobacco: Never Used    Alcohol use Yes      Comment: Social     Drug use: Unknown    Sexual activity: Not on file     Other Topics Concern    Not on file     Social History Narrative    Nonsmoker - as per [employer]        Current Medications:   Current Outpatient Prescriptions   Medication Sig Dispense Refill    atorvastatin (LIPITOR) 10 mg tablet Take 1 tablet (10 mg total) by mouth daily 30 tablet 1    losartan-hydrochlorothiazide (HYZAAR) 100-25 MG per tablet Take 1 tablet by mouth daily 30 tablet 2    terbinafine (LamISIL) 250 mg tablet take 1 tablet by mouth once daily for 3 MONTHS  0     No current facility-administered medications for this visit  Allergies: Allergies   Allergen Reactions    Amoxicillin Rash       Physical Exam:    Body surface area is 1 63 meters squared  Wt Readings from Last 3 Encounters:   01/23/19 66 4 kg (146 lb 6 4 oz)   12/24/18 64 9 kg (143 lb)   11/23/18 64 kg (141 lb)        Temp Readings from Last 3 Encounters:   01/23/19 98 7 °F (37 1 °C) (Tympanic)   12/24/18 98 1 °F (36 7 °C) (Tympanic)   11/23/18 98 6 °F (37 °C) (Tympanic)        BP Readings from Last 3 Encounters:   01/23/19 122/90   12/24/18 142/82   11/23/18 120/82         Pulse Readings from Last 3 Encounters:   01/23/19 90   12/24/18 92   11/23/18 56         Physical Exam     Constitutional   General appearance: No acute distress, well appearing and well nourished  Eyes   Conjunctiva and lids: No swelling, erythema or discharge  Pupils and irises: Equal, round and reactive to light      Ears, Nose, Mouth, and Throat   External inspection of ears and nose: Normal     Nasal mucosa, septum, and turbinates: Normal without edema or erythema  Oropharynx: Normal with no erythema, edema, exudate or lesions  Pulmonary   Respiratory effort: No increased work of breathing or signs of respiratory distress  Auscultation of lungs: Clear to auscultation  Cardiovascular   Palpation of heart: Normal PMI, no thrills  Auscultation of heart: Normal rate and rhythm, normal S1 and S2, without murmurs  Examination of extremities for edema and/or varicosities: Normal     Carotid pulses: Normal     Abdomen   Abdomen: Non-tender, no masses  Liver and spleen: No hepatomegaly or splenomegaly  Lymphatic   Palpation of lymph nodes in neck: No lymphadenopathy  Musculoskeletal   Gait and station: Normal     Digits and nails: Normal without clubbing or cyanosis  Inspection/palpation of joints, bones, and muscles: Normal     Skin   Skin and subcutaneous tissue: Normal without rashes or lesions  Neurologic   Cranial nerves: Cranial nerves 2-12 intact  Sensation: No sensory loss  Psychiatric   Orientation to person, place, and time: Normal     Mood and affect: Normal         Assessment / Plan: This is a pleasant young female who was referred to see us for lymphocytosis  Workup has been negative so far  Lymphocytosis is improving  At this point I will continue her on observation  I'll see her back in 6 months with repeat blood work  If her numbers normalize she will be discharged back to the care of her primary care physician otherwise we will continue to monitor every 6 months or so  Goals and Barriers:  Current Goal:  Prolong Survival from Lymphocytosis  Barriers: None  Patient's Capacity to Self Care:  Patient able to self care      Portions of the record may have been created with voice recognition software   Occasional wrong word or "sound a like" substitutions may have occurred due to the inherent limitations of voice recognition software   Read the chart carefully and recognize, using context, where substitutions have occurred

## 2019-02-21 ENCOUNTER — TELEPHONE (OUTPATIENT)
Dept: FAMILY MEDICINE CLINIC | Facility: CLINIC | Age: 34
End: 2019-02-21

## 2019-02-21 NOTE — TELEPHONE ENCOUNTER
Patient's  had called and made an appointment for tomorrow morning due to patient having headaches and being stressed but he had wanted to speak with you before the appointment  Xiomara Covington can be reached at [phone]

## 2019-02-22 ENCOUNTER — OFFICE VISIT (OUTPATIENT)
Dept: FAMILY MEDICINE CLINIC | Facility: CLINIC | Age: 34
End: 2019-02-22
Payer: COMMERCIAL

## 2019-02-22 VITALS
OXYGEN SATURATION: 99 % | HEART RATE: 94 BPM | TEMPERATURE: 98.1 F | SYSTOLIC BLOOD PRESSURE: 142 MMHG | HEIGHT: 60 IN | BODY MASS INDEX: 28.47 KG/M2 | WEIGHT: 145 LBS | DIASTOLIC BLOOD PRESSURE: 100 MMHG

## 2019-02-22 DIAGNOSIS — G44.209 ACUTE NON INTRACTABLE TENSION-TYPE HEADACHE: ICD-10-CM

## 2019-02-22 DIAGNOSIS — I10 ESSENTIAL HYPERTENSION: Primary | ICD-10-CM

## 2019-02-22 DIAGNOSIS — J02.9 PHARYNGITIS, UNSPECIFIED ETIOLOGY: ICD-10-CM

## 2019-02-22 LAB — S PYO AG THROAT QL: NEGATIVE

## 2019-02-22 PROCEDURE — 99214 OFFICE O/P EST MOD 30 MIN: CPT | Performed by: NURSE PRACTITIONER

## 2019-02-22 PROCEDURE — 87880 STREP A ASSAY W/OPTIC: CPT | Performed by: NURSE PRACTITIONER

## 2019-02-22 RX ORDER — AZITHROMYCIN 250 MG/1
TABLET, FILM COATED ORAL
Qty: 6 TABLET | Refills: 0 | Status: SHIPPED | OUTPATIENT
Start: 2019-02-22 | End: 2019-02-26

## 2019-02-22 RX ORDER — AMLODIPINE BESYLATE 5 MG/1
5 TABLET ORAL DAILY
Qty: 30 TABLET | Refills: 0 | Status: SHIPPED | OUTPATIENT
Start: 2019-02-22 | End: 2019-03-29 | Stop reason: SDUPTHER

## 2019-02-22 NOTE — PATIENT INSTRUCTIONS
1  Add the amlodipine 5 mg tablet (1 tablet ) daily along with your other medication  2  Salon Pas patches to right side of neck, for pain  3  Will call with strep culture   4   Claritin in day time and benadryl at night

## 2019-02-22 NOTE — PROGRESS NOTES
Assessment/Plan   Diagnoses and all orders for this visit:    Essential hypertension  Comments:  Adding amolipidine to current regement and re evaulate in 3 weeks  Orders:  -     amLODIPine (NORVASC) 5 mg tablet; Take 1 tablet (5 mg total) by mouth daily    Pharyngitis, unspecified etiology  -     azithromycin (ZITHROMAX) 250 mg tablet; Take 2 tablets today then 1 tablet daily x 4 days  -     POCT rapid strepA  -     Throat culture    Acute non intractable tension-type headache  Comments:  combination of elevated BP and tension -muscle spasm in neck, will treat to see if headaches are relieved         Chief Complaint   Patient presents with    Headache     back of neck        Subjective   Patient ID: Amor Ho is a 35 y o  female  Vitals:    02/22/19 1059   BP: 142/100   Pulse: 94   Temp: 98 1 °F (36 7 °C)   SpO2: 99%     Headache    This is a new problem  Episode onset: 2  weeks ago  The problem occurs intermittently  The problem has been unchanged  The pain is located in the right unilateral region  Radiates to: pain begins right side of neck and radiates up into the back of the head  The pain quality is not similar to prior headaches  The quality of the pain is described as shooting and aching  The pain is at a severity of 7/10  The pain is moderate  Associated symptoms include muscle aches, neck pain and a sore throat  Pertinent negatives include no abdominal pain, swollen glands or vomiting  Nothing aggravates the symptoms  She has tried nothing for the symptoms  The treatment provided no relief  (No history of headache, reports increased stress at home, in the middle of a divorce)   Sore Throat    This is a new problem  The current episode started in the past 7 days  The problem has been waxing and waning  Neither side of throat is experiencing more pain than the other  There has been no fever  Associated symptoms include headaches and neck pain   Pertinent negatives include no abdominal pain, congestion, shortness of breath, swollen glands or vomiting  Exposure to: none known  She has tried nothing for the symptoms  The treatment provided no relief  The following portions of the patient's history were reviewed and updated as appropriate: allergies, current medications, past medical history, past social history, past surgical history and problem list     Review of Systems   Constitutional: Positive for fatigue  HENT: Positive for sore throat  Negative for congestion  Eyes: Negative  Respiratory: Negative  Negative for shortness of breath  Cardiovascular: Negative  Gastrointestinal: Negative  Negative for abdominal pain and vomiting  Endocrine: Negative  Genitourinary: Negative  Musculoskeletal: Positive for neck pain  Skin: Negative  Allergic/Immunologic: Negative  Neurological: Positive for headaches  Hematological: Negative  Psychiatric/Behavioral: Negative  Objective     Physical Exam   Constitutional: She is oriented to person, place, and time  She appears well-developed and well-nourished  No distress  HENT:   Head: Normocephalic and atraumatic  Right Ear: External ear normal    Left Ear: External ear normal    Mouth/Throat: Uvula is midline and mucous membranes are normal  Posterior oropharyngeal erythema present  No oropharyngeal exudate  Tonsils are 2+ on the right  Tonsils are 2+ on the left  No tonsillar exudate  Eyes: Conjunctivae are normal  Right eye exhibits no discharge  Left eye exhibits no discharge  Neck: Normal range of motion  Neck supple  No thyromegaly present  Cardiovascular: Normal rate and regular rhythm  No murmur heard  Pulmonary/Chest: Effort normal and breath sounds normal    Abdominal: Soft  Bowel sounds are normal  She exhibits no distension  Musculoskeletal: Normal range of motion  She exhibits no edema or tenderness  Lymphadenopathy:     She has no cervical adenopathy     Neurological: She is alert and oriented to person, place, and time  Skin: Skin is warm and dry  No rash noted  She is not diaphoretic  No erythema  Psychiatric: She has a normal mood and affect  Her behavior is normal  Judgment and thought content normal    Nursing note and vitals reviewed  Allergies   Allergen Reactions    Amoxicillin Rash     There is no problem list on file for this patient      Current Outpatient Medications:     terbinafine (LamISIL) 250 mg tablet, take 1 tablet by mouth once daily for 3 MONTHS, Disp: , Rfl: 0    amLODIPine (NORVASC) 5 mg tablet, Take 1 tablet (5 mg total) by mouth daily, Disp: 30 tablet, Rfl: 0    atorvastatin (LIPITOR) 10 mg tablet, Take 1 tablet (10 mg total) by mouth daily, Disp: 30 tablet, Rfl: 1    azithromycin (ZITHROMAX) 250 mg tablet, Take 2 tablets today then 1 tablet daily x 4 days, Disp: 6 tablet, Rfl: 0    losartan-hydrochlorothiazide (HYZAAR) 100-25 MG per tablet, Take 1 tablet by mouth daily, Disp: 30 tablet, Rfl: 2  Social History     Socioeconomic History    Marital status: /Civil Union     Spouse name: Not on file    Number of children: Not on file    Years of education: Not on file    Highest education level: Not on file   Occupational History    Not on file   Social Needs    Financial resource strain: Not on file    Food insecurity:     Worry: Not on file     Inability: Not on file    Transportation needs:     Medical: Not on file     Non-medical: Not on file   Tobacco Use    Smoking status: Former Smoker    Smokeless tobacco: Never Used   Substance and Sexual Activity    Alcohol use: Yes     Comment: Social     Drug use: Never    Sexual activity: Yes     Partners: Male     Birth control/protection: None   Lifestyle    Physical activity:     Days per week: Not on file     Minutes per session: Not on file    Stress: Not on file   Relationships    Social connections:     Talks on phone: Not on file     Gets together: Not on file     Attends Cheondoism service: Not on file     Active member of club or organization: Not on file     Attends meetings of clubs or organizations: Not on file     Relationship status: Not on file    Intimate partner violence:     Fear of current or ex partner: Not on file     Emotionally abused: Not on file     Physically abused: Not on file     Forced sexual activity: Not on file   Other Topics Concern    Not on file   Social History Narrative    Nonsmoker - as per [employer]

## 2019-02-27 ENCOUNTER — TELEPHONE (OUTPATIENT)
Dept: FAMILY MEDICINE CLINIC | Facility: CLINIC | Age: 34
End: 2019-02-27

## 2019-03-12 LAB
ALBUMIN SERPL-MCNC: 4.5 G/DL (ref 3.6–5.1)
ALBUMIN/GLOB SERPL: 1.6 (CALC) (ref 1–2.5)
ALP SERPL-CCNC: 43 U/L (ref 33–115)
ALT SERPL-CCNC: 12 U/L (ref 6–29)
AST SERPL-CCNC: 13 U/L (ref 10–30)
BILIRUB SERPL-MCNC: 0.4 MG/DL (ref 0.2–1.2)
BUN SERPL-MCNC: 20 MG/DL (ref 7–25)
BUN/CREAT SERPL: NORMAL (CALC) (ref 6–22)
CALCIUM SERPL-MCNC: 9.2 MG/DL (ref 8.6–10.2)
CHLORIDE SERPL-SCNC: 102 MMOL/L (ref 98–110)
CHOLEST SERPL-MCNC: 241 MG/DL
CHOLEST/HDLC SERPL: 4.5 (CALC)
CO2 SERPL-SCNC: 27 MMOL/L (ref 20–32)
CREAT SERPL-MCNC: 0.91 MG/DL (ref 0.5–1.1)
GLOBULIN SER CALC-MCNC: 2.9 G/DL (CALC) (ref 1.9–3.7)
GLUCOSE SERPL-MCNC: 85 MG/DL (ref 65–99)
HDLC SERPL-MCNC: 54 MG/DL
LDLC SERPL CALC-MCNC: 156 MG/DL (CALC)
NONHDLC SERPL-MCNC: 187 MG/DL (CALC)
POTASSIUM SERPL-SCNC: 3.9 MMOL/L (ref 3.5–5.3)
PROT SERPL-MCNC: 7.4 G/DL (ref 6.1–8.1)
SL AMB EGFR AFRICAN AMERICAN: 96 ML/MIN/1.73M2
SL AMB EGFR NON AFRICAN AMERICAN: 83 ML/MIN/1.73M2
SODIUM SERPL-SCNC: 138 MMOL/L (ref 135–146)
TRIGL SERPL-MCNC: 172 MG/DL

## 2019-03-14 ENCOUNTER — OFFICE VISIT (OUTPATIENT)
Dept: FAMILY MEDICINE CLINIC | Facility: CLINIC | Age: 34
End: 2019-03-14
Payer: COMMERCIAL

## 2019-03-14 VITALS
TEMPERATURE: 100.1 F | HEIGHT: 60 IN | WEIGHT: 143 LBS | DIASTOLIC BLOOD PRESSURE: 100 MMHG | BODY MASS INDEX: 28.07 KG/M2 | SYSTOLIC BLOOD PRESSURE: 140 MMHG | HEART RATE: 90 BPM

## 2019-03-14 DIAGNOSIS — J06.9 VIRAL UPPER RESPIRATORY TRACT INFECTION: Primary | ICD-10-CM

## 2019-03-14 PROCEDURE — 3008F BODY MASS INDEX DOCD: CPT | Performed by: FAMILY MEDICINE

## 2019-03-14 PROCEDURE — 99213 OFFICE O/P EST LOW 20 MIN: CPT | Performed by: FAMILY MEDICINE

## 2019-03-14 PROCEDURE — 1036F TOBACCO NON-USER: CPT | Performed by: FAMILY MEDICINE

## 2019-03-14 NOTE — PROGRESS NOTES
Assessment/Plan   Diagnoses and all orders for this visit:    Viral upper respiratory tract infection  Comments:  Add coricidin HBP, fluids rest, medicate for fever        Chief Complaint   Patient presents with    Cold Like Symptoms     x 3 days    Cough     x 3 days       Subjective   Patient ID: Tam Lynn is a 35 y o  female  Vitals:    03/14/19 1438   BP: 140/100   Pulse: 90   Temp: 100 1 °F (37 8 °C)     URI    This is a new problem  Episode onset: three days ago  The problem has been unchanged  The maximum temperature recorded prior to her arrival was 100 4 - 100 9 F  The fever has been present for 1 to 2 days  Associated symptoms include congestion, coughing, diarrhea (1 episode), headaches, sinus pain, a sore throat and swollen glands  Pertinent negatives include no dysuria, ear pain, joint pain, joint swelling, nausea, neck pain, plugged ear sensation, rash, rhinorrhea, sneezing, vomiting or wheezing  She has tried acetaminophen (1 dose of nyquill) for the symptoms  The treatment provided no relief  The following portions of the patient's history were reviewed and updated as appropriate: allergies, current medications, past medical history, past social history, past surgical history and problem list     Review of Systems   Constitutional: Positive for appetite change, chills, fatigue and fever  HENT: Positive for congestion, sinus pain and sore throat  Negative for ear pain, rhinorrhea and sneezing  Eyes: Negative  Respiratory: Positive for cough  Negative for shortness of breath, wheezing and stridor  Cardiovascular: Negative  Gastrointestinal: Positive for diarrhea (1 episode)  Negative for nausea and vomiting  Endocrine: Negative  Genitourinary: Negative  Negative for dysuria  Musculoskeletal: Negative  Negative for joint pain and neck pain  Skin: Negative  Negative for rash  Allergic/Immunologic: Negative  Neurological: Positive for headaches  Hematological: Negative  Psychiatric/Behavioral: Negative  Objective     Physical Exam   Constitutional: She is oriented to person, place, and time  She appears well-developed and well-nourished  No distress (appear ill but not toxic)  HENT:   Head: Normocephalic and atraumatic  Right Ear: No mastoid tenderness  Tympanic membrane is not erythematous, not retracted and not bulging  No decreased hearing is noted  Left Ear: No mastoid tenderness  Tympanic membrane is not erythematous and not bulging  No decreased hearing is noted  Nose: Mucosal edema present  No rhinorrhea, nose lacerations or sinus tenderness  Right sinus exhibits no maxillary sinus tenderness and no frontal sinus tenderness  Left sinus exhibits maxillary sinus tenderness  Left sinus exhibits no frontal sinus tenderness  Mouth/Throat: Uvula is midline and mucous membranes are normal  Posterior oropharyngeal erythema present  No oropharyngeal exudate, posterior oropharyngeal edema or tonsillar abscesses  Tonsils are 1+ on the right  Tonsils are 1+ on the left  No tonsillar exudate  Eyes: Conjunctivae are normal  Right eye exhibits no discharge  Left eye exhibits no discharge  Neck: Normal range of motion  Neck supple  No thyromegaly present  Cardiovascular: Normal rate, regular rhythm, normal heart sounds and intact distal pulses  Pulmonary/Chest: Effort normal and breath sounds normal  No respiratory distress  She has no wheezes  Abdominal: Soft  Musculoskeletal: Normal range of motion  She exhibits no edema or tenderness  Lymphadenopathy:     She has cervical adenopathy  Neurological: She is alert and oriented to person, place, and time  She displays normal reflexes  No cranial nerve deficit  She exhibits normal muscle tone  Coordination normal    Skin: Skin is warm and dry  Capillary refill takes less than 2 seconds  No rash noted  She is not diaphoretic  No erythema  No pallor     Psychiatric: She has a normal mood and affect  Her behavior is normal  Judgment and thought content normal    Nursing note and vitals reviewed  Allergies   Allergen Reactions    Amoxicillin Rash     There is no problem list on file for this patient        Current Outpatient Medications:     amLODIPine (NORVASC) 5 mg tablet, Take 1 tablet (5 mg total) by mouth daily, Disp: 30 tablet, Rfl: 0    losartan-hydrochlorothiazide (HYZAAR) 100-25 MG per tablet, Take 1 tablet by mouth daily, Disp: 30 tablet, Rfl: 2    terbinafine (LamISIL) 250 mg tablet, take 1 tablet by mouth once daily for 3 MONTHS, Disp: , Rfl: 0  Social History     Socioeconomic History    Marital status: /Civil Union     Spouse name: Not on file    Number of children: Not on file    Years of education: Not on file    Highest education level: Not on file   Occupational History    Not on file   Social Needs    Financial resource strain: Not on file    Food insecurity:     Worry: Not on file     Inability: Not on file    Transportation needs:     Medical: Not on file     Non-medical: Not on file   Tobacco Use    Smoking status: Former Smoker    Smokeless tobacco: Never Used   Substance and Sexual Activity    Alcohol use: Yes     Comment: Social     Drug use: Never    Sexual activity: Yes     Partners: Male     Birth control/protection: None   Lifestyle    Physical activity:     Days per week: Not on file     Minutes per session: Not on file    Stress: Not on file   Relationships    Social connections:     Talks on phone: Not on file     Gets together: Not on file     Attends Alevism service: Not on file     Active member of club or organization: Not on file     Attends meetings of clubs or organizations: Not on file     Relationship status: Not on file    Intimate partner violence:     Fear of current or ex partner: Not on file     Emotionally abused: Not on file     Physically abused: Not on file     Forced sexual activity: Not on file   Other Topics Concern    Not on file   Social History Narrative    Nonsmoker - as per [employer]      Family History   Problem Relation Age of Onset    Hypertension Mother     Diabetes Mother     Hypertension Father     Diabetes Father

## 2019-03-14 NOTE — PATIENT INSTRUCTIONS
Cold Symptoms   WHAT YOU NEED TO KNOW:   A cold is an infection caused by a virus  The infection causes your upper respiratory system to become inflamed  Common symptoms of a cold include sneezing, dry throat, a stuffy nose, headache, watery eyes, and a cough  Your cough may be dry, or you may cough up mucus  You may also have muscle aches, joint pain, and tiredness  Rarely, you may have a fever  Most colds go away without treatment  DISCHARGE INSTRUCTIONS:   Return to the emergency department if:   · You have increased tiredness and weakness  · You are unable to eat  · Your heart is beating much faster than usual for you  · You see white spots in the back of your throat and your neck is swollen and sore to the touch  · You see pinpoint or larger reddish-purple dots on your skin  Contact your healthcare provider if:   · You have a fever higher than 102°F (38 9°C)  · You have new or worsening shortness of breath  · You have thick nasal drainage for more than 2 days  · Your symptoms do not improve or get worse within 5 days  · You have questions or concerns about your condition or care  Medicines: The following medicines may be suggested by your healthcare provider to decrease your cold symptoms  These medicines are available without a doctor's order  Ask which medicines to take and when to take them  Follow directions  · NSAIDs or acetaminophen  help to bring down a fever or decrease pain  · Decongestants  help decrease nasal stuffiness  · Antihistamines  help decrease sneezing and a runny nose  · Cough suppressants  help decrease how much you cough  · Expectorants  help loosen mucus so you can cough it up  · Take your medicine as directed  Contact your healthcare provider if you think your medicine is not helping or if you have side effects  Tell him of her if you are allergic to any medicine  Keep a list of the medicines, vitamins, and herbs you take   Include the amounts, and when and why you take them  Bring the list or the pill bottles to follow-up visits  Carry your medicine list with you in case of an emergency  Symptom relief: The following may help relieve cold symptoms, such as a dry throat and congestion:  · Gargle with mouthwash or warm salt water as directed  · Suck on throat lozenges or hard candy  · Use a cold or warm vaporizer or humidifier to ease your breathing  · Rest for at least 2 days and then as needed to decrease tiredness and weakness  · Use petroleum based jelly around your nostrils to decrease irritation from blowing your nose  Drink liquids:  Liquids will help thin and loosen thick mucus so you can cough it up  Liquids will also keep you hydrated  Ask your healthcare provider which liquids are best for you and how much to drink each day  Prevent the spread of germs: You can spread your cold germs to others for at least 3 days after your symptoms start  Wash your hands often  Do not share items, such as eating utensils  Cover your nose and mouth when you cough or sneeze using the crook of your elbow instead of your hands  Throw used tissues in the garbage  Do not smoke:  Smoking may worsen your symptoms and increase the length of time you feel sick  Talk with your healthcare provider if you need help to stop smoking  Follow up with your healthcare provider as directed:  Write down your questions so you remember to ask them during your visits  © 2017 [employer] Information is for End User's use only and may not be sold, redistributed or otherwise used for commercial purposes  All illustrations and images included in CareNotes® are the copyrighted property of [employer]  or [employer]  The above information is an  only  It is not intended as medical advice for individual conditions or treatments   Talk to your doctor, nurse or pharmacist before following any medical regimen to see if it is safe and effective for you

## 2019-03-29 ENCOUNTER — OFFICE VISIT (OUTPATIENT)
Dept: FAMILY MEDICINE CLINIC | Facility: CLINIC | Age: 34
End: 2019-03-29
Payer: COMMERCIAL

## 2019-03-29 VITALS
DIASTOLIC BLOOD PRESSURE: 74 MMHG | WEIGHT: 143 LBS | BODY MASS INDEX: 28.07 KG/M2 | HEIGHT: 60 IN | OXYGEN SATURATION: 99 % | SYSTOLIC BLOOD PRESSURE: 118 MMHG | RESPIRATION RATE: 14 BRPM | TEMPERATURE: 97.8 F | HEART RATE: 83 BPM

## 2019-03-29 DIAGNOSIS — I10 ESSENTIAL HYPERTENSION: ICD-10-CM

## 2019-03-29 PROCEDURE — 1036F TOBACCO NON-USER: CPT | Performed by: FAMILY MEDICINE

## 2019-03-29 PROCEDURE — 3078F DIAST BP <80 MM HG: CPT | Performed by: FAMILY MEDICINE

## 2019-03-29 PROCEDURE — 3074F SYST BP LT 130 MM HG: CPT | Performed by: FAMILY MEDICINE

## 2019-03-29 PROCEDURE — 99213 OFFICE O/P EST LOW 20 MIN: CPT | Performed by: FAMILY MEDICINE

## 2019-03-29 PROCEDURE — 3008F BODY MASS INDEX DOCD: CPT | Performed by: FAMILY MEDICINE

## 2019-03-29 RX ORDER — AMLODIPINE BESYLATE 5 MG/1
5 TABLET ORAL DAILY
Qty: 30 TABLET | Refills: 2 | Status: SHIPPED | OUTPATIENT
Start: 2019-03-29 | End: 2019-08-29 | Stop reason: SDUPTHER

## 2019-03-29 RX ORDER — LOSARTAN POTASSIUM AND HYDROCHLOROTHIAZIDE 25; 100 MG/1; MG/1
1 TABLET ORAL DAILY
Qty: 30 TABLET | Refills: 2 | Status: SHIPPED | OUTPATIENT
Start: 2019-03-29 | End: 2019-08-29 | Stop reason: SDUPTHER

## 2019-03-29 NOTE — PROGRESS NOTES
Assessment/Plan   Diagnoses and all orders for this visit:    Essential hypertension  Comments:  Adding amolipidine to current regement and re evaulate in 3 weeks  Orders:  -     amLODIPine (NORVASC) 5 mg tablet; Take 1 tablet (5 mg total) by mouth daily  -     losartan-hydrochlorothiazide (HYZAAR) 100-25 MG per tablet; Take 1 tablet by mouth daily    Essential hypertension  -     amLODIPine (NORVASC) 5 mg tablet; Take 1 tablet (5 mg total) by mouth daily  -     losartan-hydrochlorothiazide (HYZAAR) 100-25 MG per tablet; Take 1 tablet by mouth daily        Chief Complaint   Patient presents with    Hypertension     recheck blood pressure    Follow-up     follow up to review labs        Subjective   Patient ID: Vj Umana is a 35 y o  female  Vitals:    03/29/19 1029   BP: 118/74   Pulse: 83   Resp: 14   Temp: 97 8 °F (36 6 °C)   SpO2: 99%     Hypertension   This is a chronic problem  The current episode started more than 1 month ago  The problem has been resolved since onset  The problem is controlled  (None) There are no associated agents to hypertension  Risk factors for coronary artery disease include dyslipidemia  Past treatments include calcium channel blockers and angiotensin blockers  The current treatment provides significant improvement  There are no compliance problems  The following portions of the patient's history were reviewed and updated as appropriate: allergies, current medications, past family history, past social history, past surgical history and problem list     Review of Systems   Constitutional: Negative  HENT: Negative  Eyes: Negative  Respiratory: Negative  Cardiovascular: Negative  Gastrointestinal: Negative  Endocrine: Negative  Genitourinary: Negative  Musculoskeletal: Negative  Skin: Negative  Allergic/Immunologic: Negative  Neurological: Negative  Hematological: Negative  Psychiatric/Behavioral: Negative          Objective Physical Exam   Constitutional: She is oriented to person, place, and time  She appears well-developed and well-nourished  No distress  HENT:   Head: Normocephalic and atraumatic  Eyes: Pupils are equal, round, and reactive to light  Conjunctivae are normal  Right eye exhibits no discharge  Left eye exhibits no discharge  Neck: Normal range of motion  Neck supple  Cardiovascular: Normal rate, regular rhythm and normal heart sounds  No murmur heard  Pulmonary/Chest: Effort normal and breath sounds normal  No respiratory distress  Abdominal: Soft  Bowel sounds are normal    Musculoskeletal: Normal range of motion  She exhibits no edema or tenderness  Neurological: She is alert and oriented to person, place, and time  Skin: Skin is warm and dry  No rash noted  She is not diaphoretic  No erythema  Psychiatric: She has a normal mood and affect  Her behavior is normal  Judgment and thought content normal    Nursing note and vitals reviewed  Allergies   Allergen Reactions    Amoxicillin Rash     There is no problem list on file for this patient        Current Outpatient Medications:     amLODIPine (NORVASC) 5 mg tablet, Take 1 tablet (5 mg total) by mouth daily, Disp: 30 tablet, Rfl: 2    losartan-hydrochlorothiazide (HYZAAR) 100-25 MG per tablet, Take 1 tablet by mouth daily, Disp: 30 tablet, Rfl: 2    terbinafine (LamISIL) 250 mg tablet, take 1 tablet by mouth once daily for 3 MONTHS, Disp: , Rfl: 0  Social History     Socioeconomic History    Marital status: /Civil Union     Spouse name: Not on file    Number of children: Not on file    Years of education: Not on file    Highest education level: Not on file   Occupational History    Not on file   Social Needs    Financial resource strain: Not on file    Food insecurity:     Worry: Not on file     Inability: Not on file    Transportation needs:     Medical: Not on file     Non-medical: Not on file   Tobacco Use    Smoking status: Former Smoker    Smokeless tobacco: Never Used   Substance and Sexual Activity    Alcohol use: Yes     Comment: Social     Drug use: Never    Sexual activity: Yes     Partners: Male     Birth control/protection: None   Lifestyle    Physical activity:     Days per week: Not on file     Minutes per session: Not on file    Stress: Not on file   Relationships    Social connections:     Talks on phone: Not on file     Gets together: Not on file     Attends Quaker service: Not on file     Active member of club or organization: Not on file     Attends meetings of clubs or organizations: Not on file     Relationship status: Not on file    Intimate partner violence:     Fear of current or ex partner: Not on file     Emotionally abused: Not on file     Physically abused: Not on file     Forced sexual activity: Not on file   Other Topics Concern    Not on file   Social History Narrative    Nonsmoker - as per [employer]      Family History   Problem Relation Age of Onset    Hypertension Mother     Diabetes Mother     Hypertension Father     Diabetes Father

## 2019-08-29 ENCOUNTER — APPOINTMENT (OUTPATIENT)
Dept: LAB | Facility: CLINIC | Age: 34
End: 2019-08-29
Payer: COMMERCIAL

## 2019-08-29 ENCOUNTER — OFFICE VISIT (OUTPATIENT)
Dept: FAMILY MEDICINE CLINIC | Facility: CLINIC | Age: 34
End: 2019-08-29

## 2019-08-29 VITALS
DIASTOLIC BLOOD PRESSURE: 86 MMHG | HEIGHT: 60 IN | HEART RATE: 72 BPM | BODY MASS INDEX: 27.41 KG/M2 | TEMPERATURE: 98.1 F | SYSTOLIC BLOOD PRESSURE: 124 MMHG | OXYGEN SATURATION: 99 % | WEIGHT: 139.6 LBS

## 2019-08-29 DIAGNOSIS — R53.83 FATIGUE, UNSPECIFIED TYPE: ICD-10-CM

## 2019-08-29 DIAGNOSIS — E78.2 MIXED HYPERLIPIDEMIA: ICD-10-CM

## 2019-08-29 DIAGNOSIS — I10 ESSENTIAL HYPERTENSION: Primary | ICD-10-CM

## 2019-08-29 DIAGNOSIS — I10 ESSENTIAL HYPERTENSION: ICD-10-CM

## 2019-08-29 LAB
CHOLEST SERPL-MCNC: 255 MG/DL (ref 50–200)
HDLC SERPL-MCNC: 46 MG/DL (ref 40–60)
LDLC SERPL CALC-MCNC: 166 MG/DL (ref 0–100)
NONHDLC SERPL-MCNC: 209 MG/DL
TRIGL SERPL-MCNC: 217 MG/DL
TSH SERPL DL<=0.05 MIU/L-ACNC: 1.13 UIU/ML (ref 0.36–3.74)

## 2019-08-29 PROCEDURE — 99213 OFFICE O/P EST LOW 20 MIN: CPT | Performed by: NURSE PRACTITIONER

## 2019-08-29 PROCEDURE — 36415 COLL VENOUS BLD VENIPUNCTURE: CPT

## 2019-08-29 PROCEDURE — 84443 ASSAY THYROID STIM HORMONE: CPT

## 2019-08-29 PROCEDURE — 80061 LIPID PANEL: CPT

## 2019-08-29 RX ORDER — AMLODIPINE BESYLATE 5 MG/1
5 TABLET ORAL DAILY
Qty: 30 TABLET | Refills: 2 | Status: SHIPPED | OUTPATIENT
Start: 2019-08-29 | End: 2020-03-12 | Stop reason: SDUPTHER

## 2019-08-29 RX ORDER — LOSARTAN POTASSIUM AND HYDROCHLOROTHIAZIDE 25; 100 MG/1; MG/1
1 TABLET ORAL DAILY
Qty: 30 TABLET | Refills: 2 | Status: SHIPPED | OUTPATIENT
Start: 2019-08-29 | End: 2019-11-18

## 2019-08-29 NOTE — PROGRESS NOTES
Assessment/Plan   Diagnoses and all orders for this visit:    Essential hypertension  Comments:  Continue losartan/HCTZ 100/25 PO daily  Orders:  -     losartan-hydrochlorothiazide (HYZAAR) 100-25 MG per tablet; Take 1 tablet by mouth daily  -     amLODIPine (NORVASC) 5 mg tablet; Take 1 tablet (5 mg total) by mouth daily    Essential hypertension  Comments:  Continue amlodipine 5 mg Po daily  Orders:  -     losartan-hydrochlorothiazide (HYZAAR) 100-25 MG per tablet; Take 1 tablet by mouth daily  -     amLODIPine (NORVASC) 5 mg tablet; Take 1 tablet (5 mg total) by mouth daily    Mixed hyperlipidemia  -     Lipid panel; Future    Fatigue, unspecified type  -     TSH, 3rd generation with Free T4 reflex; Future        Chief Complaint   Patient presents with    Follow-up     BP follow up, BW follow up, hand pain when hands are cold usually in winter        Subjective   Patient ID: Janet Son is a 35 y o  female  Vitals:    08/29/19 0915   BP: 124/86   Pulse: 72   Temp: 98 1 °F (36 7 °C)   SpO2: 99%     Sae Knox has not been here for re-evaluation of cholersterol - where she was attempting diet and exercise to lower her LDL, she has lost her health insurance and does not qualify for medical assistance  She would like to address her lipds today  Good Rx card given for for discounted medications   reviewed for lab test cost     Hypertension   This is a chronic problem  The current episode started more than 1 year ago  The problem has been resolved since onset  The problem is controlled  (None) There are no associated agents to hypertension  Risk factors for coronary artery disease include dyslipidemia, sedentary lifestyle and stress  Past treatments include calcium channel blockers and angiotensin blockers  The current treatment provides significant improvement  There are no compliance problems  none noted  none known  Hyperlipidemia   This is a chronic problem   The current episode started more than 1 year ago  The problem is resistant  Recent lipid tests were reviewed and are high  none  There are no known factors aggravating her hyperlipidemia  (No symptoms) Current antihyperlipidemic treatment includes diet change and exercise  The current treatment provides no improvement of lipids  Compliance problems include adherence to exercise and adherence to diet  Fatigue   This is a recurrent problem  The current episode started more than 1 month ago  The problem occurs intermittently  The problem has been waxing and waning  Associated symptoms include fatigue  Associated symptoms comments: No other symptoms  Nothing aggravates the symptoms  She has tried rest and relaxation for the symptoms  The treatment provided mild relief  The following portions of the patient's history were reviewed and updated as appropriate: allergies, current medications, past medical history, past social history, past surgical history and problem list     Review of Systems   Constitutional: Positive for fatigue  HENT: Negative  Eyes: Negative  Respiratory: Negative  Cardiovascular: Negative  Gastrointestinal: Negative  Endocrine: Negative  Genitourinary: Negative  Musculoskeletal: Negative  Skin: Negative  Allergic/Immunologic: Negative  Neurological: Negative  Hematological: Negative  Psychiatric/Behavioral: Negative  Objective     Physical Exam   Constitutional: She is oriented to person, place, and time  She appears well-developed and well-nourished  No distress  HENT:   Head: Normocephalic and atraumatic  Eyes: Pupils are equal, round, and reactive to light  Conjunctivae are normal  Right eye exhibits no discharge  Left eye exhibits no discharge  Neck: Normal range of motion  Neck supple  No thyromegaly present  Cardiovascular: Normal rate, regular rhythm, normal heart sounds and intact distal pulses  No murmur heard    Pulmonary/Chest: Effort normal and breath sounds normal  No respiratory distress  She has no wheezes  Abdominal: Soft  Bowel sounds are normal  She exhibits no distension and no mass  There is no tenderness  There is no rebound and no guarding  Musculoskeletal: Normal range of motion  She exhibits no edema or deformity  Lymphadenopathy:     She has no cervical adenopathy  Neurological: She is alert and oriented to person, place, and time  Skin: Skin is warm and dry  Capillary refill takes less than 2 seconds  She is not diaphoretic  Psychiatric: She has a normal mood and affect  Her behavior is normal  Judgment and thought content normal    Nursing note and vitals reviewed  Allergies   Allergen Reactions    Amoxicillin Rash     There is no problem list on file for this patient        Current Outpatient Medications:     amLODIPine (NORVASC) 5 mg tablet, Take 1 tablet (5 mg total) by mouth daily, Disp: 30 tablet, Rfl: 2    losartan-hydrochlorothiazide (HYZAAR) 100-25 MG per tablet, Take 1 tablet by mouth daily, Disp: 30 tablet, Rfl: 2  Social History     Socioeconomic History    Marital status: /Civil Union     Spouse name: Not on file    Number of children: Not on file    Years of education: Not on file    Highest education level: Not on file   Occupational History    Not on file   Social Needs    Financial resource strain: Not on file    Food insecurity:     Worry: Not on file     Inability: Not on file    Transportation needs:     Medical: Not on file     Non-medical: Not on file   Tobacco Use    Smoking status: Former Smoker    Smokeless tobacco: Never Used   Substance and Sexual Activity    Alcohol use: Not Currently     Frequency: Monthly or less     Drinks per session: 1 or 2     Binge frequency: Never     Comment: Social     Drug use: Never    Sexual activity: Yes     Partners: Male     Birth control/protection: None   Lifestyle    Physical activity:     Days per week: Not on file     Minutes per session: Not on file    Stress: Not on file   Relationships    Social connections:     Talks on phone: Not on file     Gets together: Not on file     Attends [employer] service: Not on file     Active member of club or organization: Not on file     Attends meetings of clubs or organizations: Not on file     Relationship status: Not on file    Intimate partner violence:     Fear of current or ex partner: Not on file     Emotionally abused: Not on file     Physically abused: Not on file     Forced sexual activity: Not on file   Other Topics Concern    Not on file   Social History Narrative    Nonsmoker - as per [employer]      Family History   Problem Relation Age of Onset    Hypertension Mother     Diabetes Mother     Hypertension Father     Diabetes Father

## 2019-09-03 ENCOUNTER — TELEPHONE (OUTPATIENT)
Dept: FAMILY MEDICINE CLINIC | Facility: CLINIC | Age: 34
End: 2019-09-03

## 2019-09-03 DIAGNOSIS — E78.2 MIXED HYPERLIPIDEMIA: ICD-10-CM

## 2019-09-03 DIAGNOSIS — E78.2 MIXED HYPERLIPIDEMIA: Primary | ICD-10-CM

## 2019-09-03 RX ORDER — ATORVASTATIN CALCIUM 20 MG/1
20 TABLET, FILM COATED ORAL DAILY
Qty: 30 TABLET | Refills: 2 | Status: SHIPPED | OUTPATIENT
Start: 2019-09-03 | End: 2019-09-03

## 2019-09-03 RX ORDER — ATORVASTATIN CALCIUM 20 MG/1
20 TABLET, FILM COATED ORAL DAILY
Qty: 30 TABLET | Refills: 2 | Status: SHIPPED | OUTPATIENT
Start: 2019-09-03 | End: 2019-09-03 | Stop reason: SDUPTHER

## 2019-09-03 RX ORDER — ATORVASTATIN CALCIUM 20 MG/1
20 TABLET, FILM COATED ORAL DAILY
Qty: 30 TABLET | Refills: 2 | Status: SHIPPED | OUTPATIENT
Start: 2019-09-03 | End: 2020-01-14 | Stop reason: SDUPTHER

## 2019-09-03 NOTE — TELEPHONE ENCOUNTER
Treating LDL elevation - atorvastatin 20 mg, will call if problems, and if no side effect es retest in 3 months

## 2019-09-03 NOTE — TELEPHONE ENCOUNTER
Patients  called, he wanted to know if patient could take Lovastan instead of Lipitor  He takes it and wanted to know if she could  I told him I would check with you and someone would call him back    [phone]

## 2019-11-18 ENCOUNTER — OFFICE VISIT (OUTPATIENT)
Dept: FAMILY MEDICINE CLINIC | Facility: CLINIC | Age: 34
End: 2019-11-18

## 2019-11-18 VITALS
BODY MASS INDEX: 28.27 KG/M2 | DIASTOLIC BLOOD PRESSURE: 82 MMHG | RESPIRATION RATE: 15 BRPM | SYSTOLIC BLOOD PRESSURE: 134 MMHG | OXYGEN SATURATION: 98 % | TEMPERATURE: 96.9 F | HEIGHT: 60 IN | HEART RATE: 65 BPM | WEIGHT: 144 LBS

## 2019-11-18 DIAGNOSIS — R09.82 POST-NASAL DRIP: ICD-10-CM

## 2019-11-18 DIAGNOSIS — R05.9 COUGH: Primary | ICD-10-CM

## 2019-11-18 DIAGNOSIS — I10 ESSENTIAL HYPERTENSION: ICD-10-CM

## 2019-11-18 PROCEDURE — 99213 OFFICE O/P EST LOW 20 MIN: CPT | Performed by: NURSE PRACTITIONER

## 2019-11-18 RX ORDER — LOSARTAN POTASSIUM 100 MG/1
100 TABLET ORAL DAILY
Qty: 30 TABLET | Refills: 2 | Status: SHIPPED | OUTPATIENT
Start: 2019-11-18 | End: 2020-03-12 | Stop reason: SDUPTHER

## 2019-11-18 RX ORDER — LORATADINE 10 MG/1
10 TABLET ORAL DAILY
Qty: 30 TABLET | Refills: 0 | Status: SHIPPED | OUTPATIENT
Start: 2019-11-18 | End: 2020-03-12

## 2019-11-18 RX ORDER — BENZONATATE 100 MG/1
100 CAPSULE ORAL 3 TIMES DAILY PRN
Qty: 20 CAPSULE | Refills: 0 | Status: SHIPPED | OUTPATIENT
Start: 2019-11-18 | End: 2020-03-12 | Stop reason: ALTCHOICE

## 2019-11-18 RX ORDER — HYDROCHLOROTHIAZIDE 25 MG/1
25 TABLET ORAL DAILY
Qty: 30 TABLET | Refills: 2 | Status: SHIPPED | OUTPATIENT
Start: 2019-11-18 | End: 2020-03-12 | Stop reason: SDUPTHER

## 2019-11-18 NOTE — PROGRESS NOTES
Assessment/Plan   Problem List Items Addressed This Visit     None      Visit Diagnoses     Cough    -  Primary    Relevant Medications    benzonatate (TESSALON PERLES) 100 mg capsule    Post-nasal drip        Relevant Medications    loratadine (CLARITIN) 10 mg tablet    Essential hypertension        Relevant Medications    losartan (COZAAR) 100 MG tablet    hydrochlorothiazide (HYDRODIURIL) 25 mg tablet                Chief Complaint   Patient presents with    Cough     begining approx 7-10 days ago    Sinus Problem       Subjective   Patient ID: Sussy Garcia is a 29 y o  female  Vitals:    11/18/19 0821   BP: 134/82   Pulse: 65   Resp: 15   Temp: (!) 96 9 °F (36 1 °C)   SpO2: 98%     Cough   This is a new (Began with cold type symptoms for 10 days, fever initially but none for last few days) problem  The current episode started in the past 7 days  The problem has been unchanged  The problem occurs every few minutes  The cough is non-productive  Associated symptoms include chills, a fever, myalgias, nasal congestion, postnasal drip and a sore throat  Pertinent negatives include no chest pain, shortness of breath, weight loss or wheezing  The symptoms are aggravated by lying down  Risk factors: none known  She has tried OTC cough suppressant (OTC cold) for the symptoms  The treatment provided mild relief  Her past medical history is significant for environmental allergies  The following portions of the patient's history were reviewed and updated as appropriate: allergies, current medications, past medical history, past social history, past surgical history and problem list     Review of Systems   Constitutional: Positive for chills and fever  Negative for weight loss  HENT: Positive for postnasal drip and sore throat  Facial swelling: facial pain  Eyes: Negative  Respiratory: Positive for cough  Negative for shortness of breath and wheezing  Cardiovascular: Negative for chest pain  Gastrointestinal: Negative  Negative for blood in stool and diarrhea  Endocrine: Negative  Genitourinary: Negative  Musculoskeletal: Positive for myalgias  Skin: Negative  Allergic/Immunologic: Positive for environmental allergies  Hematological: Negative  Psychiatric/Behavioral: Negative  Objective     Physical Exam   Constitutional: She is oriented to person, place, and time  She appears well-developed and well-nourished  No distress  HENT:   Head: Normocephalic and atraumatic  Right Ear: External ear normal  Tympanic membrane is not perforated, not erythematous and not bulging  No middle ear effusion  Left Ear: External ear normal  Tympanic membrane is not perforated, not erythematous and not bulging  No middle ear effusion  Nose: No mucosal edema or sinus tenderness  Right sinus exhibits no maxillary sinus tenderness and no frontal sinus tenderness  Left sinus exhibits no maxillary sinus tenderness and no frontal sinus tenderness  Mouth/Throat: Uvula is midline, oropharynx is clear and moist and mucous membranes are normal  No posterior oropharyngeal erythema or tonsillar abscesses  Tonsils are 1+ on the right  Tonsils are 1+ on the left  No tonsillar exudate  Eyes: Pupils are equal, round, and reactive to light  Conjunctivae and EOM are normal  Right eye exhibits no discharge  Left eye exhibits no discharge  Neck: Normal range of motion  Neck supple  No thyromegaly present  Cardiovascular: Normal rate, regular rhythm, normal heart sounds and intact distal pulses  No murmur heard  Pulmonary/Chest: Effort normal and breath sounds normal  She has no wheezes  Abdominal: Soft  Bowel sounds are normal  There is no tenderness  Musculoskeletal: Normal range of motion  She exhibits no edema, tenderness or deformity  Lymphadenopathy:     She has cervical adenopathy  Neurological: She is alert and oriented to person, place, and time  Skin: Skin is warm and dry  Capillary refill takes less than 2 seconds  She is not diaphoretic  Psychiatric: She has a normal mood and affect  Her behavior is normal  Judgment and thought content normal    Nursing note and vitals reviewed      Allergies   Allergen Reactions    Amoxicillin Rash

## 2020-01-11 ENCOUNTER — APPOINTMENT (OUTPATIENT)
Dept: LAB | Facility: CLINIC | Age: 35
End: 2020-01-11
Payer: COMMERCIAL

## 2020-01-11 DIAGNOSIS — E78.2 MIXED HYPERLIPIDEMIA: ICD-10-CM

## 2020-01-11 LAB
CHOLEST SERPL-MCNC: 256 MG/DL (ref 50–200)
HDLC SERPL-MCNC: 52 MG/DL
LDLC SERPL CALC-MCNC: 173 MG/DL (ref 0–100)
NONHDLC SERPL-MCNC: 204 MG/DL
TRIGL SERPL-MCNC: 157 MG/DL

## 2020-01-11 PROCEDURE — 36415 COLL VENOUS BLD VENIPUNCTURE: CPT

## 2020-01-11 PROCEDURE — 80061 LIPID PANEL: CPT

## 2020-01-14 ENCOUNTER — TELEPHONE (OUTPATIENT)
Dept: FAMILY MEDICINE CLINIC | Facility: CLINIC | Age: 35
End: 2020-01-14

## 2020-01-14 DIAGNOSIS — E78.2 MIXED HYPERLIPIDEMIA: ICD-10-CM

## 2020-01-14 RX ORDER — ATORVASTATIN CALCIUM 20 MG/1
20 TABLET, FILM COATED ORAL DAILY
Qty: 30 TABLET | Refills: 3 | Status: SHIPPED | OUTPATIENT
Start: 2020-01-14 | End: 2020-03-12 | Stop reason: SDUPTHER

## 2020-01-16 ENCOUNTER — TELEPHONE (OUTPATIENT)
Dept: FAMILY MEDICINE CLINIC | Facility: CLINIC | Age: 35
End: 2020-01-16

## 2020-02-29 ENCOUNTER — APPOINTMENT (EMERGENCY)
Dept: RADIOLOGY | Facility: HOSPITAL | Age: 35
End: 2020-02-29

## 2020-02-29 ENCOUNTER — HOSPITAL ENCOUNTER (EMERGENCY)
Facility: HOSPITAL | Age: 35
Discharge: HOME | End: 2020-02-29
Attending: EMERGENCY MEDICINE

## 2020-02-29 VITALS
BODY MASS INDEX: 28.12 KG/M2 | RESPIRATION RATE: 16 BRPM | TEMPERATURE: 97.2 F | WEIGHT: 144 LBS | OXYGEN SATURATION: 99 % | SYSTOLIC BLOOD PRESSURE: 128 MMHG | DIASTOLIC BLOOD PRESSURE: 68 MMHG | HEART RATE: 75 BPM

## 2020-02-29 DIAGNOSIS — R07.89 ATYPICAL CHEST PAIN: Primary | ICD-10-CM

## 2020-02-29 PROCEDURE — 93005 ELECTROCARDIOGRAM TRACING: CPT

## 2020-02-29 PROCEDURE — 71046 X-RAY EXAM CHEST 2 VIEWS: CPT

## 2020-02-29 PROCEDURE — 99284 EMERGENCY DEPT VISIT MOD MDM: CPT

## 2020-02-29 PROCEDURE — 99285 EMERGENCY DEPT VISIT HI MDM: CPT | Performed by: PHYSICIAN ASSISTANT

## 2020-02-29 RX ORDER — LIDOCAINE 50 MG/G
1 PATCH TOPICAL DAILY
Status: DISCONTINUED | OUTPATIENT
Start: 2020-02-29 | End: 2020-02-29 | Stop reason: HOSPADM

## 2020-02-29 RX ORDER — IBUPROFEN 400 MG/1
400 TABLET ORAL ONCE
Status: COMPLETED | OUTPATIENT
Start: 2020-02-29 | End: 2020-02-29

## 2020-02-29 RX ADMIN — LIDOCAINE 1 PATCH: 50 PATCH TOPICAL at 15:01

## 2020-02-29 RX ADMIN — IBUPROFEN 400 MG: 400 TABLET ORAL at 15:01

## 2020-02-29 NOTE — ED PROVIDER NOTES
History  Chief Complaint   Patient presents with    Back Pain     To ED with c/o mid back pain started yesterday with radiation to left chest wall  Patient has been cough as well  Denies any fever or chills  30 yo female w/ hx of HTN presents for evaluation of thoracic back pain radiating to the chest beginning last PM  Has had similar pain in the past, resolved spontaneously  Pain is sharp, without modifying factors, no associated fever, chills, sweats, nausea, vomiting, or diarrhea  She has not taken medicines today to help with her pain  She has no family history of cardiovascular disease  No recent surgical procedures or prolonged immobilization  The patient does not take any hormonal contraceptives          Prior to Admission Medications   Prescriptions Last Dose Informant Patient Reported? Taking? amLODIPine (NORVASC) 5 mg tablet  Self No No   Sig: Take 1 tablet (5 mg total) by mouth daily   atorvastatin (LIPITOR) 20 mg tablet   No No   Sig: Take 1 tablet (20 mg total) by mouth daily   benzonatate (TESSALON PERLES) 100 mg capsule   No No   Sig: Take 1 capsule (100 mg total) by mouth 3 (three) times a day as needed for cough   hydrochlorothiazide (HYDRODIURIL) 25 mg tablet   No No   Sig: Take 1 tablet (25 mg total) by mouth daily   loratadine (CLARITIN) 10 mg tablet   No No   Sig: Take 1 tablet (10 mg total) by mouth daily   losartan (COZAAR) 100 MG tablet   No No   Sig: Take 1 tablet (100 mg total) by mouth daily      Facility-Administered Medications: None       Past Medical History:   Diagnosis Date    Hypertension        Past Surgical History:   Procedure Laterality Date     SECTION         Family History   Problem Relation Age of Onset    Hypertension Mother     Diabetes Mother     Hypertension Father     Diabetes Father      I have reviewed and agree with the history as documented      E-Cigarette/Vaping    E-Cigarette Use Never User      E-Cigarette/Vaping Substances    Nicotine No     Flavoring No      Social History     Tobacco Use    Smoking status: Former Smoker    Smokeless tobacco: Never Used   Substance Use Topics    Alcohol use: Not Currently     Frequency: Monthly or less     Drinks per session: 1 or 2     Binge frequency: Never     Comment: Social     Drug use: Never       Review of Systems   Constitutional: Negative for appetite change, chills, diaphoresis and fever  Eyes: Negative for visual disturbance  Respiratory: Negative for cough, chest tightness and shortness of breath  Cardiovascular: Positive for chest pain  Negative for palpitations and leg swelling  Gastrointestinal: Negative for abdominal pain, diarrhea, nausea and vomiting  Musculoskeletal: Positive for back pain  Negative for arthralgias and myalgias  Skin: Negative for color change and rash  Neurological: Negative for dizziness, light-headedness and headaches  Hematological: Does not bruise/bleed easily  Psychiatric/Behavioral: Negative for confusion  Physical Exam  Physical Exam   Constitutional: She is oriented to person, place, and time  She appears well-developed and well-nourished  No distress  HENT:   Head: Normocephalic and atraumatic  Mouth/Throat: Oropharynx is clear and moist    Eyes: Pupils are equal, round, and reactive to light  No scleral icterus  Neck: No JVD present  Cardiovascular: Normal rate and regular rhythm  Exam reveals no gallop and no friction rub  No murmur heard  Pulses:       Radial pulses are 2+ on the right side, and 2+ on the left side  Pulmonary/Chest: No respiratory distress  She has no wheezes  She has no rales  Abdominal: Soft  Bowel sounds are normal  She exhibits no distension and no mass  There is no tenderness  There is no rebound and no guarding  Musculoskeletal: She exhibits no edema  Neurological: She is alert and oriented to person, place, and time  Skin: Skin is warm and dry   Capillary refill takes less than 2 seconds  She is not diaphoretic  No pallor  Psychiatric: She has a normal mood and affect  Her behavior is normal    Vitals reviewed        Vital Signs  ED Triage Vitals [02/29/20 1349]   Temperature Pulse Respirations Blood Pressure SpO2   (!) 97 2 °F (36 2 °C) 82 20 133/80 100 %      Temp Source Heart Rate Source Patient Position - Orthostatic VS BP Location FiO2 (%)   Tympanic Monitor Sitting Right arm --      Pain Score       7           Vitals:    02/29/20 1349 02/29/20 1400 02/29/20 1430 02/29/20 1500   BP: 133/80 124/74 122/82 128/68   Pulse: 82 73 63 75   Patient Position - Orthostatic VS: Sitting Sitting           Visual Acuity      ED Medications  Medications   ibuprofen (MOTRIN) tablet 400 mg (400 mg Oral Given 2/29/20 1501)       Diagnostic Studies  Results Reviewed     None                 XR chest 2 views   ED Interpretation by Chidi Peng PA-C (02/29 1450)   No acute cardiopulmonary disease                 Procedures  ECG 12 Lead Documentation Only  Date/Time: 2/29/2020 2:45 PM  Performed by: Chidi Peng PA-C  Authorized by: Chidi Peng PA-C     Indications / Diagnosis:  Chest pain  ECG reviewed by me, the ED Provider: yes    Patient location:  ED  Previous ECG:     Previous ECG:  Unavailable  Interpretation:     Interpretation: normal    Rate:     ECG rate:  75    ECG rate assessment: normal    Rhythm:     Rhythm: sinus rhythm    Ectopy:     Ectopy: none    QRS:     QRS axis:  Normal    QRS intervals:  Normal  Conduction:     Conduction: normal    ST segments:     ST segments:  Normal  T waves:     T waves: normal               ED Course               PERC Rule for PE      Most Recent Value   PERC Rule for PE   Age >=50  0 Filed at: 02/29/2020 1417   HR >=100  0 Filed at: 02/29/2020 1417   O2 Sat on room air < 95%  0 Filed at: 02/29/2020 1417   History of PE or DVT  0 Filed at: 02/29/2020 1417   Recent trauma or surgery  0 Filed at: 02/29/2020 1417   Hemoptysis  0 Filed at: 02/29/2020 1417 Exogenous estrogen  0 Filed at: 02/29/2020 1417   Unilateral leg swelling  0 Filed at: 02/29/2020 1417   PERC Rule for PE Results  0 Filed at: 02/29/2020 1417                      [facility]  Number of Diagnoses or Management Options  Atypical chest pain: new and requires workup  Diagnosis management comments: X-ray and EKG are unremarkable  Patient's pain did improve with Lidoderm as well as anti-inflammatories in the emergency department  This is likely musculoskeletal in origin  She meets PERC criteria and thus no PE workup is pursued  Amount and/or Complexity of Data Reviewed  Tests in the radiology section of CPT®: ordered and reviewed  Review and summarize past medical records: yes  Independent visualization of images, tracings, or specimens: yes          Disposition  Final diagnoses:   Atypical chest pain     Time reflects when diagnosis was documented in both MDM as applicable and the Disposition within this note     Time User Action Codes Description Comment    2/29/2020  2:56 PM Jeniffer Valverde Add [R07 [id number]] Atypical chest pain       ED Disposition     ED Disposition Condition Date/Time Comment    Discharge Stable Sat Feb 29, 2020  2:56 PM Cecilia De La Vega discharge to home/self care              Follow-up Information     Follow up With Specialties Details Why [id number], [address] Nurse Practitioner In 3 days  [address] [facility] [address]  [address] [facility] [phone]            Discharge Medication List as of 2/29/2020  2:58 PM      CONTINUE these medications which have NOT CHANGED    Details   amLODIPine (NORVASC) 5 mg tablet Take 1 tablet (5 mg total) by mouth daily, Starting Thu 8/29/2019, Print      atorvastatin (LIPITOR) 20 mg tablet Take 1 tablet (20 mg total) by mouth daily, Starting Tue 1/14/2020, Normal      benzonatate (TESSALON PERLES) 100 mg capsule Take 1 capsule (100 mg total) by mouth 3 (three) times a day as needed for cough, Starting Mon 11/18/2019, Normal      hydrochlorothiazide (HYDRODIURIL) 25 mg tablet Take 1 tablet (25 mg total) by mouth daily, Starting Mon 11/18/2019, Normal      loratadine (CLARITIN) 10 mg tablet Take 1 tablet (10 mg total) by mouth daily, Starting Mon 11/18/2019, Normal      losartan (COZAAR) 100 MG tablet Take 1 tablet (100 mg total) by mouth daily, Starting Mon 11/18/2019, Normal           No discharge procedures on file      PDMP Review     None          ED Provider  Electronically Signed by           Ari Gavin PA-C  03/01/20 1515

## 2020-03-01 LAB
ATRIAL RATE: 75 BPM
P AXIS: 76 DEGREES
PR INTERVAL: 162 MS
QRS AXIS: 68 DEGREES
QRSD INTERVAL: 82 MS
QT INTERVAL: 412 MS
QTC INTERVAL: 460 MS
T WAVE AXIS: 63 DEGREES
VENTRICULAR RATE: 75 BPM

## 2020-03-01 PROCEDURE — 93010 ELECTROCARDIOGRAM REPORT: CPT | Performed by: INTERNAL MEDICINE

## 2020-03-03 ENCOUNTER — VBI (OUTPATIENT)
Dept: FAMILY MEDICINE CLINIC | Facility: CLINIC | Age: 35
End: 2020-03-03

## 2020-03-03 NOTE — TELEPHONE ENCOUNTER
Praveen Hess    ED Visit Information     Ed visit date: 2/29/2020  Diagnosis Description: Atypical chest pain  In Network? [address]  Discharge status: Home  Discharged with meds ? No  Number of ED visits to date: 1  ED Severity:n/a     Outreach Information    Outreach successful: Yes 1  Date letter mailed:n/a  Date Finalized:3/3/2020    Care Coordination    Follow up appointment with pcp: no No ED f/u appt scheduled  Transportation issues ? No    [facility] [employer] type:  7 Day Outreach  Emergent necessity warranted by diagnosis:  No  [facility] PCP:  Yes  Transportation:  Friend/Family Transport  03/03/2020 12:59 PM Phone (BO.LT) Gustavo Christian (Self) [phone] (H)   Left Message  Unable to reach patient regarding recent ED visit on 2/29/2020 for Atypical chest pain  2nd attempt will be made on 3/3/2020      03/03/2020 03:36 PM Phone (Incoming) Gustavo Christian (Self) [phone] (H)   Rtn Call  Personal communication with patient regarding recent ED visit on 2/29/2020 for Back Pain  Patient was discharged without medication and advised to follow up with PCP  Patient stated that she is feeling better and declined to schedule a follow up appt with PCP at this time  Patient does not meet OPCM criteria  Patient is aware of PCP after hours on-call service  Patient is aware of urgent care facility and what conditions may be treated there

## 2020-03-12 ENCOUNTER — OFFICE VISIT (OUTPATIENT)
Dept: FAMILY MEDICINE CLINIC | Facility: CLINIC | Age: 35
End: 2020-03-12

## 2020-03-12 VITALS
DIASTOLIC BLOOD PRESSURE: 86 MMHG | SYSTOLIC BLOOD PRESSURE: 130 MMHG | OXYGEN SATURATION: 99 % | WEIGHT: 144.8 LBS | TEMPERATURE: 97.6 F | HEART RATE: 62 BPM | BODY MASS INDEX: 28.43 KG/M2 | HEIGHT: 60 IN

## 2020-03-12 DIAGNOSIS — I10 ESSENTIAL HYPERTENSION: ICD-10-CM

## 2020-03-12 DIAGNOSIS — H68.101 OBSTRUCTION OF RIGHT EUSTACHIAN TUBE: Primary | ICD-10-CM

## 2020-03-12 DIAGNOSIS — E78.2 MIXED HYPERLIPIDEMIA: ICD-10-CM

## 2020-03-12 PROCEDURE — 3079F DIAST BP 80-89 MM HG: CPT | Performed by: NURSE PRACTITIONER

## 2020-03-12 PROCEDURE — 3075F SYST BP GE 130 - 139MM HG: CPT | Performed by: NURSE PRACTITIONER

## 2020-03-12 PROCEDURE — 99213 OFFICE O/P EST LOW 20 MIN: CPT | Performed by: NURSE PRACTITIONER

## 2020-03-12 PROCEDURE — 3008F BODY MASS INDEX DOCD: CPT | Performed by: NURSE PRACTITIONER

## 2020-03-12 PROCEDURE — 1036F TOBACCO NON-USER: CPT | Performed by: NURSE PRACTITIONER

## 2020-03-12 RX ORDER — AMLODIPINE BESYLATE 5 MG/1
5 TABLET ORAL DAILY
Qty: 30 TABLET | Refills: 2 | Status: SHIPPED | OUTPATIENT
Start: 2020-03-12 | End: 2021-06-09

## 2020-03-12 RX ORDER — HYDROCHLOROTHIAZIDE 25 MG/1
25 TABLET ORAL DAILY
Qty: 30 TABLET | Refills: 2 | Status: SHIPPED | OUTPATIENT
Start: 2020-03-12 | End: 2020-11-03

## 2020-03-12 RX ORDER — FLUTICASONE PROPIONATE 50 MCG
1 SPRAY, SUSPENSION (ML) NASAL DAILY
Qty: 1 BOTTLE | Refills: 2 | Status: SHIPPED | OUTPATIENT
Start: 2020-03-12 | End: 2021-06-09

## 2020-03-12 RX ORDER — LOSARTAN POTASSIUM 100 MG/1
100 TABLET ORAL DAILY
Qty: 30 TABLET | Refills: 2 | Status: SHIPPED | OUTPATIENT
Start: 2020-03-12 | End: 2020-11-03

## 2020-03-12 RX ORDER — LORATADINE 10 MG/1
10 TABLET ORAL DAILY
Qty: 30 TABLET | Refills: 1 | Status: SHIPPED | OUTPATIENT
Start: 2020-03-12 | End: 2022-04-04 | Stop reason: HOSPADM

## 2020-03-12 RX ORDER — ATORVASTATIN CALCIUM 20 MG/1
20 TABLET, FILM COATED ORAL DAILY
Qty: 30 TABLET | Refills: 3 | Status: SHIPPED | OUTPATIENT
Start: 2020-03-12 | End: 2021-06-09 | Stop reason: SINTOL

## 2020-03-12 NOTE — PROGRESS NOTES
Assessment/Plan   Problem List Items Addressed This Visit     None      Visit Diagnoses     Obstruction of right eustachian tube    -  Primary    Relevant Medications    loratadine (CLARITIN) 10 mg tablet    fluticasone (FLONASE) 50 mcg/act nasal spray    Essential hypertension        Relevant Medications    losartan (COZAAR) 100 MG tablet    hydrochlorothiazide (HYDRODIURIL) 25 mg tablet    amLODIPine (NORVASC) 5 mg tablet    Mixed hyperlipidemia        Relevant Medications    atorvastatin (LIPITOR) 20 mg tablet    Essential hypertension        Continue amlodipine 5 mg Po daily    Relevant Medications    losartan (COZAAR) 100 MG tablet    hydrochlorothiazide (HYDRODIURIL) 25 mg tablet    amLODIPine (NORVASC) 5 mg tablet                Chief Complaint   Patient presents with    Follow-up     Follow up "[facility]" ER 02/29/20 back pain       Subjective   Patient ID: Fabrizio Deleon is a 29 y o  female  Vitals:    03/12/20 1007   BP: 130/86   Pulse: 62   Temp: 97 6 °F (36 4 °C)   SpO2: 99%     Back Pain   This is a recurrent (was in ER several days ago with atypical chest pain and upper back pain which has resolved) problem  The current episode started in the past 7 days  The problem occurs intermittently  The problem has been resolved since onset  The pain is present in the thoracic spine  The quality of the pain is described as cramping  The pain does not radiate  The patient is experiencing no pain  She has tried muscle relaxant, NSAIDs and ice for the symptoms  The treatment provided significant relief  Earache    There is pain in the right ear  This is a recurrent problem  The current episode started in the past 7 days  The problem occurs constantly  The problem has been waxing and waning  There has been no fever  The pain is at a severity of 8/10  The pain is moderate  Associated symptoms include rhinorrhea ("allergies at times")  She has tried nothing for the symptoms  The treatment provided no relief   There is no history of a chronic ear infection, hearing loss or a tympanostomy tube  The following portions of the patient's history were reviewed and updated as appropriate: allergies, current medications, past medical history, past social history, past surgical history and problem list     Review of Systems   Constitutional: Negative  HENT: Positive for ear pain and rhinorrhea ("allergies at times")  Eyes: Negative  Respiratory: Negative  Cardiovascular: Negative  Gastrointestinal: Negative  Negative for blood in stool  Endocrine: Negative  Genitourinary: Negative  Musculoskeletal: Positive for back pain  Skin: Negative  Allergic/Immunologic: Negative  Neurological: Negative  Hematological: Negative  Psychiatric/Behavioral: Negative  Objective     Physical Exam   Constitutional: She is oriented to person, place, and time  She appears well-developed and well-nourished  No distress  HENT:   Head: Normocephalic and atraumatic  Right Ear: External ear normal  Tympanic membrane is bulging  Tympanic membrane is not erythematous  A middle ear effusion is present  Left Ear: External ear normal  Tympanic membrane is not erythematous and not bulging  No middle ear effusion  Nose: No mucosal edema or sinus tenderness  Right sinus exhibits no maxillary sinus tenderness  Left sinus exhibits no maxillary sinus tenderness  Mouth/Throat: Uvula is midline, oropharynx is clear and moist and mucous membranes are normal  Tonsils are 1+ on the right  Tonsils are 1+ on the left  No tonsillar exudate  Eyes: Conjunctivae are normal  Right eye exhibits no discharge  Left eye exhibits no discharge  Neck: Normal range of motion  Neck supple  No thyromegaly present  Cardiovascular: Normal rate and regular rhythm  No murmur heard  Pulmonary/Chest: Effort normal and breath sounds normal  No respiratory distress  She has no rales  Abdominal: Soft   Bowel sounds are normal  She exhibits no distension  Musculoskeletal: Normal range of motion  She exhibits no edema or tenderness  Lymphadenopathy:     She has no cervical adenopathy  Neurological: She is alert and oriented to person, place, and time  Skin: Skin is warm and dry  Capillary refill takes less than 2 seconds  She is not diaphoretic  Psychiatric: She has a normal mood and affect  Her behavior is normal  Judgment and thought content normal    Nursing note and vitals reviewed      Allergies   Allergen Reactions    Amoxicillin Rash

## 2020-03-12 NOTE — PATIENT INSTRUCTIONS
Eustachian Tube Dysfunction   WHAT YOU NEED TO KNOW:   What is eustachian tube dysfunction? Eustachian tube dysfunction (ETD) is a condition that prevents your eustachian tubes from opening properly  It can also cause them to become blocked  Eustachian tubes connect your middle ear to the back of your nose and throat  These tubes open and allow air to flow in and out when you sneeze, swallow, or yawn  What causes or increases my risk of ETD? ETD may be caused by swelling or buildup of mucus in your eustachian tubes  Allergies, a cold, or sinus infection can increase your risk for ETD  Smoking also increases your risk for ETD  What are the signs and symptoms of ETD? · Fullness or pressure in your ears    · Muffled hearing    · Pain in one or both ears    · Ringing in your ears    · Popping or clicking feeling in your ears    · Trouble keeping your balance  How is ETD diagnosed? Your healthcare provider will ask about your symptoms  He will examine your ears, your nose, and the back of your throat  He may also do a hearing test    How is ETD treated? Your ETD may get better on its own without any treatment  You may need any of the following:  · Exercises  such as swallowing, yawning, or chewing gum may help to open your eustachian tubes  Your healthcare provider may also recommend that you take a deep breath and then blow with your mouth shut and your nostrils pinched closed  · Air pressure devices  push air into your nose and eustachian tubes to help relieve air pressure in your ear  · Treatment for allergies  such as decongestants, antihistamines, and nasal steroids may improve ETD  They may help decrease swelling of the eustachian tubes  · Ear tubes  may help to keep your middle ear open  During this procedure, your healthcare provider will cut a small hole in your eardrum  · A myringotomy  is procedure to make a small cut in your eardrum and suction out fluid from your middle ear  · Tuboplasty  is a procedure to widen your eustachian tubes  When should I contact my healthcare provider? · Your symptoms do not improve or get worse  · You have a fever  · You have any hearing loss  · You have questions or concerns about your condition or care  CARE AGREEMENT:   You have the right to help plan your care  Learn about your health condition and how it may be treated  Discuss treatment options with your caregivers to decide what care you want to receive  You always have the right to refuse treatment  The above information is an  only  It is not intended as medical advice for individual conditions or treatments  Talk to your doctor, nurse or pharmacist before following any medical regimen to see if it is safe and effective for you  © 2017 [employer]  Information is for End User's use only and may not be sold, redistributed or otherwise used for commercial purposes  All illustrations and images included in CareNotes® are the copyrighted property of [employer]  or [employer]

## 2020-07-14 ENCOUNTER — APPOINTMENT (OUTPATIENT)
Dept: LAB | Facility: CLINIC | Age: 35
End: 2020-07-14
Payer: COMMERCIAL

## 2020-07-14 ENCOUNTER — OFFICE VISIT (OUTPATIENT)
Dept: FAMILY MEDICINE CLINIC | Facility: CLINIC | Age: 35
End: 2020-07-14

## 2020-07-14 VITALS
HEIGHT: 60 IN | DIASTOLIC BLOOD PRESSURE: 86 MMHG | WEIGHT: 144.8 LBS | RESPIRATION RATE: 15 BRPM | BODY MASS INDEX: 28.43 KG/M2 | OXYGEN SATURATION: 98 % | HEART RATE: 82 BPM | SYSTOLIC BLOOD PRESSURE: 122 MMHG | TEMPERATURE: 98.5 F

## 2020-07-14 DIAGNOSIS — K21.9 GASTROESOPHAGEAL REFLUX DISEASE WITHOUT ESOPHAGITIS: ICD-10-CM

## 2020-07-14 DIAGNOSIS — R10.2 PELVIC PAIN: ICD-10-CM

## 2020-07-14 DIAGNOSIS — R10.84 GENERALIZED ABDOMINAL PAIN: Primary | ICD-10-CM

## 2020-07-14 DIAGNOSIS — R10.84 GENERALIZED ABDOMINAL PAIN: ICD-10-CM

## 2020-07-14 DIAGNOSIS — E78.2 MIXED HYPERLIPIDEMIA: ICD-10-CM

## 2020-07-14 LAB
ALBUMIN SERPL BCP-MCNC: 4.3 G/DL (ref 3.5–5)
ALP SERPL-CCNC: 48 U/L (ref 46–116)
ALT SERPL W P-5'-P-CCNC: 33 U/L (ref 12–78)
AMYLASE SERPL-CCNC: 46 IU/L (ref 25–115)
ANION GAP SERPL CALCULATED.3IONS-SCNC: 6 MMOL/L (ref 4–13)
AST SERPL W P-5'-P-CCNC: 17 U/L (ref 5–45)
BASOPHILS # BLD AUTO: 0.03 THOUSANDS/ΜL (ref 0–0.1)
BASOPHILS NFR BLD AUTO: 0 % (ref 0–1)
BILIRUB SERPL-MCNC: 0.49 MG/DL (ref 0.2–1)
BUN SERPL-MCNC: 12 MG/DL (ref 5–25)
CALCIUM SERPL-MCNC: 9.7 MG/DL (ref 8.3–10.1)
CHLORIDE SERPL-SCNC: 105 MMOL/L (ref 100–108)
CHOLEST SERPL-MCNC: 211 MG/DL (ref 50–200)
CO2 SERPL-SCNC: 26 MMOL/L (ref 21–32)
CREAT SERPL-MCNC: 0.88 MG/DL (ref 0.6–1.3)
EOSINOPHIL # BLD AUTO: 0.14 THOUSAND/ΜL (ref 0–0.61)
EOSINOPHIL NFR BLD AUTO: 2 % (ref 0–6)
ERYTHROCYTE [DISTWIDTH] IN BLOOD BY AUTOMATED COUNT: 12.5 % (ref 11.6–15.1)
GFR SERPL CREATININE-BSD FRML MDRD: 86 ML/MIN/1.73SQ M
GLUCOSE P FAST SERPL-MCNC: 89 MG/DL (ref 65–99)
HCT VFR BLD AUTO: 44.6 % (ref 34.8–46.1)
HDLC SERPL-MCNC: 53 MG/DL
HGB BLD-MCNC: 14.5 G/DL (ref 11.5–15.4)
IMM GRANULOCYTES # BLD AUTO: 0.02 THOUSAND/UL (ref 0–0.2)
IMM GRANULOCYTES NFR BLD AUTO: 0 % (ref 0–2)
LDLC SERPL CALC-MCNC: 104 MG/DL (ref 0–100)
LYMPHOCYTES # BLD AUTO: 4.13 THOUSANDS/ΜL (ref 0.6–4.47)
LYMPHOCYTES NFR BLD AUTO: 47 % (ref 14–44)
MCH RBC QN AUTO: 31.3 PG (ref 26.8–34.3)
MCHC RBC AUTO-ENTMCNC: 32.5 G/DL (ref 31.4–37.4)
MCV RBC AUTO: 96 FL (ref 82–98)
MONOCYTES # BLD AUTO: 0.61 THOUSAND/ΜL (ref 0.17–1.22)
MONOCYTES NFR BLD AUTO: 7 % (ref 4–12)
NEUTROPHILS # BLD AUTO: 3.94 THOUSANDS/ΜL (ref 1.85–7.62)
NEUTS SEG NFR BLD AUTO: 44 % (ref 43–75)
NRBC BLD AUTO-RTO: 0 /100 WBCS
PLATELET # BLD AUTO: 376 THOUSANDS/UL (ref 149–390)
PMV BLD AUTO: 10.3 FL (ref 8.9–12.7)
POTASSIUM SERPL-SCNC: 3.8 MMOL/L (ref 3.5–5.3)
PROT SERPL-MCNC: 8.6 G/DL (ref 6.4–8.2)
RBC # BLD AUTO: 4.64 MILLION/UL (ref 3.81–5.12)
SL AMB  POCT GLUCOSE, UA: NEGATIVE
SL AMB LEUKOCYTE ESTERASE,UA: NEGATIVE
SL AMB POCT BILIRUBIN,UA: NEGATIVE
SL AMB POCT BLOOD,UA: NEGATIVE
SL AMB POCT CLARITY,UA: CLEAR
SL AMB POCT COLOR,UA: YELLOW
SL AMB POCT KETONES,UA: NEGATIVE
SL AMB POCT NITRITE,UA: NEGATIVE
SL AMB POCT PH,UA: 6
SL AMB POCT SPECIFIC GRAVITY,UA: 1
SL AMB POCT URINE PROTEIN: NEGATIVE
SL AMB POCT UROBILINOGEN: NORMAL
SODIUM SERPL-SCNC: 137 MMOL/L (ref 136–145)
TRIGL SERPL-MCNC: 268 MG/DL
WBC # BLD AUTO: 8.87 THOUSAND/UL (ref 4.31–10.16)

## 2020-07-14 PROCEDURE — 99214 OFFICE O/P EST MOD 30 MIN: CPT | Performed by: NURSE PRACTITIONER

## 2020-07-14 PROCEDURE — 87491 CHLMYD TRACH DNA AMP PROBE: CPT

## 2020-07-14 PROCEDURE — 3074F SYST BP LT 130 MM HG: CPT | Performed by: NURSE PRACTITIONER

## 2020-07-14 PROCEDURE — 1036F TOBACCO NON-USER: CPT | Performed by: NURSE PRACTITIONER

## 2020-07-14 PROCEDURE — 87591 N.GONORRHOEAE DNA AMP PROB: CPT

## 2020-07-14 PROCEDURE — 85025 COMPLETE CBC W/AUTO DIFF WBC: CPT

## 2020-07-14 PROCEDURE — 80053 COMPREHEN METABOLIC PANEL: CPT

## 2020-07-14 PROCEDURE — 81002 URINALYSIS NONAUTO W/O SCOPE: CPT | Performed by: NURSE PRACTITIONER

## 2020-07-14 PROCEDURE — 80061 LIPID PANEL: CPT

## 2020-07-14 PROCEDURE — 36415 COLL VENOUS BLD VENIPUNCTURE: CPT

## 2020-07-14 PROCEDURE — 3079F DIAST BP 80-89 MM HG: CPT | Performed by: NURSE PRACTITIONER

## 2020-07-14 PROCEDURE — 3008F BODY MASS INDEX DOCD: CPT | Performed by: NURSE PRACTITIONER

## 2020-07-14 PROCEDURE — 87086 URINE CULTURE/COLONY COUNT: CPT | Performed by: NURSE PRACTITIONER

## 2020-07-14 PROCEDURE — 87591 N.GONORRHOEAE DNA AMP PROB: CPT | Performed by: NURSE PRACTITIONER

## 2020-07-14 PROCEDURE — 82150 ASSAY OF AMYLASE: CPT

## 2020-07-14 RX ORDER — OMEPRAZOLE 20 MG/1
20 CAPSULE, DELAYED RELEASE ORAL DAILY
Qty: 14 CAPSULE | Refills: 0 | Status: SHIPPED | OUTPATIENT
Start: 2020-07-14 | End: 2021-06-09

## 2020-07-14 NOTE — PATIENT INSTRUCTIONS
1  Try the omeprazole daily  2  Labs and call to schedule US   3  Will call with any result  4   ED if pain increases or fever

## 2020-07-14 NOTE — PROGRESS NOTES
Assessment/Plan   Problem List Items Addressed This Visit     None      Visit Diagnoses     Generalized abdominal pain    -  Primary    Relevant Orders    US abdomen and pelvis with transvaginal    CBC and differential    Comprehensive metabolic panel    Amylase    Pelvic pain        Relevant Orders    POCT urine dip (Completed)    Urine culture    Chlamydia/GC amplified DNA by PCR    US abdomen and pelvis with transvaginal    CBC and differential    Comprehensive metabolic panel    Amylase    Gastroesophageal reflux disease without esophagitis        Relevant Medications    omeprazole (PriLOSEC) 20 mg delayed release capsule                Chief Complaint   Patient presents with    Back Pain     for 5 days    Pelvic Pain       Subjective   Patient ID: Freddie Tariq is a 29 y o  female  Vitals:    07/14/20 0855   BP: 122/86   Pulse: 82   Resp: 15   Temp: 98 5 °F (36 9 °C)   SpO2: 98%     GYN appointent schedule next week      Abdominal Pain   This is a new problem  The current episode started in the past 7 days (reports 5 days of intermittant abdominal pain"all over" none with sitting her etoday but "hurts if I press on it" , also admits to "burning feeling in epigastric area at night )  The onset quality is undetermined  The problem occurs intermittently  The problem has been waxing and waning  The pain is located in the generalized abdominal region (and at times in pelvic area)  The pain is at a severity of 5/10  The pain is moderate  The quality of the pain is aching, a sensation of fullness and burning  Associated symptoms include constipation ("not going as ofeten as I have in the past") and dysuria ("sometimes a pulling feeling" when I pee)  Pertinent negatives include no fever or hematuria  The pain is aggravated by palpation and urination  The pain is relieved by nothing  She has tried nothing for the symptoms  The treatment provided no relief   LMP 6/27/2020- "tubal ligation"       The following portions of the patient's history were reviewed and updated as appropriate: allergies, current medications, past family history, past social history, past surgical history and problem list     Review of Systems   Constitutional: Negative  Negative for chills, fatigue and fever  HENT: Negative  Eyes: Negative  Respiratory: Negative  Cardiovascular: Negative  Gastrointestinal: Positive for abdominal pain and constipation ("not going as ofeten as I have in the past")  Genitourinary: Positive for dysuria ("sometimes a pulling feeling" when I pee) and pelvic pain  Negative for difficulty urinating, dyspareunia, hematuria, urgency, vaginal bleeding, vaginal discharge and vaginal pain  Musculoskeletal: Positive for back pain ("sometimes")  Skin: Negative  Allergic/Immunologic: Negative  Neurological: Negative  Hematological: Negative  Psychiatric/Behavioral: Negative  Objective     Physical Exam   Constitutional: She is oriented to person, place, and time  She appears well-developed and well-nourished  No distress  HENT:   Head: Normocephalic and atraumatic  Eyes: Conjunctivae and EOM are normal  Right eye exhibits no discharge  Left eye exhibits no discharge  Neck: Normal range of motion  Neck supple  Cardiovascular: Normal rate and regular rhythm  No murmur heard  Pulmonary/Chest: Effort normal and breath sounds normal    Abdominal: Soft  Normal appearance  Bowel sounds are decreased  There is no hepatosplenomegaly, splenomegaly or hepatomegaly  There is generalized tenderness  There is no rigidity, no rebound, no guarding, no CVA tenderness, no tenderness at McBurney's point and negative Bueno's sign  No hernia  Musculoskeletal: Normal range of motion  She exhibits no edema or tenderness  Neurological: She is alert and oriented to person, place, and time  Skin: Skin is warm and dry  Capillary refill takes less than 2 seconds  She is not diaphoretic  Psychiatric: She has a normal mood and affect  Her behavior is normal  Judgment and thought content normal    Nursing note and vitals reviewed

## 2020-07-15 LAB — BACTERIA UR CULT: NORMAL

## 2020-07-17 ENCOUNTER — HOSPITAL ENCOUNTER (OUTPATIENT)
Dept: ULTRASOUND IMAGING | Facility: CLINIC | Age: 35
Discharge: HOME | End: 2020-07-17

## 2020-07-17 DIAGNOSIS — R10.84 GENERALIZED ABDOMINAL PAIN: ICD-10-CM

## 2020-07-17 DIAGNOSIS — R10.2 PELVIC PAIN: ICD-10-CM

## 2020-07-17 LAB
C TRACH RRNA SPEC QL NAA+PROBE: NEGATIVE
N GONORRHOEA RRNA SPEC QL NAA+PROBE: NEGATIVE

## 2020-07-17 PROCEDURE — 76700 US EXAM ABDOM COMPLETE: CPT

## 2020-07-17 PROCEDURE — 76856 US EXAM PELVIC COMPLETE: CPT

## 2020-07-17 PROCEDURE — 76830 TRANSVAGINAL US NON-OB: CPT

## 2020-07-21 ENCOUNTER — OFFICE VISIT (OUTPATIENT)
Dept: OBGYN CLINIC | Facility: CLINIC | Age: 35
End: 2020-07-21
Payer: COMMERCIAL

## 2020-07-21 VITALS — SYSTOLIC BLOOD PRESSURE: 128 MMHG | DIASTOLIC BLOOD PRESSURE: 70 MMHG | WEIGHT: 148 LBS | BODY MASS INDEX: 28.9 KG/M2

## 2020-07-21 DIAGNOSIS — Z01.419 ENCOUNTER FOR ANNUAL ROUTINE GYNECOLOGICAL EXAMINATION: Primary | ICD-10-CM

## 2020-07-21 PROCEDURE — 99385 PREV VISIT NEW AGE 18-39: CPT | Performed by: OBSTETRICS & GYNECOLOGY

## 2020-07-21 NOTE — PROGRESS NOTES
Assessment/Plan: They are going to obtain her last Pap result from [facility] as they are self pay and and would prefer to not have 1 if she is not yet due  If she does need 1, we can bring her back and do a Pap no charge as it would have been part of today's exam     Discussed self breast exams        discussed preventive care, regular exercise and a healthy diet      No problem-specific Assessment & Plan notes found for this encounter  Diagnoses and all orders for this visit:    Encounter for annual routine gynecological examination          Subjective:      Patient ID: Kusum Elizondo is a 29 y o  female  New patient-29year-old female presents with her  for a yearly exam   He is acting as  although the patient seems to understand the majority the conversation  Two weeks ago, they were at the beach and she was lifting heavy colors and since then she has been having some low back pain and pelvic pain  On Friday, she had a pelvic ultrasound that was ordered by her primary care provider  The results are not back yet  She has regular menstrual cycles  She uses a diva cup and is happy with this  She does have an Essure that was inserted in 2014  Status post 3 vaginal deliveries and 1 C section  Past medical history includes hypertension and high cholesterol  She has had regular gyn care in [place], [place] and thinks that her last exam was approximately 2 years ago  No history of abnormal Paps  The following portions of the patient's history were reviewed and updated as appropriate: allergies, current medications, past family history, past medical history, past social history, past surgical history and problem list     Review of Systems   Constitutional: Negative  HENT: Negative  Eyes: Negative  Respiratory: Negative  Cardiovascular: Negative  Gastrointestinal: Negative  Endocrine: Negative  Genitourinary: Negative  Musculoskeletal: Negative      Skin: Negative  Allergic/Immunologic: Negative  Neurological: Negative  Hematological: Negative  Psychiatric/Behavioral: Negative  Objective:      /70 (BP Location: Left arm, Patient Position: Sitting, Cuff Size: Standard)   Wt 67 1 kg (148 lb)   LMP 06/27/2020 (Exact Date)   BMI 28 90 kg/m²          Physical Exam   Constitutional: She appears well-developed  Neck: No tracheal deviation present  No thyromegaly present  Cardiovascular: Normal rate and regular rhythm  Pulmonary/Chest: Effort normal and breath sounds normal  Right breast exhibits no inverted nipple, no mass, no nipple discharge, no skin change and no tenderness  Left breast exhibits no inverted nipple, no mass, no nipple discharge, no skin change and no tenderness  Breasts are symmetrical    Examined seated and supine   Abdominal: Soft  She exhibits no distension and no mass  There is no tenderness  Genitourinary: Vagina normal and uterus normal  No labial fusion  There is no rash, tenderness, lesion or injury on the right labia  There is no rash, tenderness, lesion or injury on the left labia  Cervix exhibits no motion tenderness, no discharge and no friability  Right adnexum displays no mass, no tenderness and no fullness  Left adnexum displays no mass, no tenderness and no fullness  Vitals reviewed

## 2020-11-03 DIAGNOSIS — I10 ESSENTIAL HYPERTENSION: ICD-10-CM

## 2020-11-03 RX ORDER — HYDROCHLOROTHIAZIDE 25 MG/1
TABLET ORAL
Qty: 30 TABLET | Refills: 3 | Status: SHIPPED | OUTPATIENT
Start: 2020-11-03 | End: 2021-06-09

## 2020-11-03 RX ORDER — LOSARTAN POTASSIUM 100 MG/1
TABLET ORAL
Qty: 30 TABLET | Refills: 3 | Status: SHIPPED | OUTPATIENT
Start: 2020-11-03 | End: 2021-06-09

## 2021-02-13 ENCOUNTER — OFFICE VISIT (OUTPATIENT)
Dept: URGENT CARE | Facility: CLINIC | Age: 36
End: 2021-02-13
Payer: COMMERCIAL

## 2021-02-13 VITALS
BODY MASS INDEX: 26.92 KG/M2 | HEART RATE: 65 BPM | WEIGHT: 142.6 LBS | TEMPERATURE: 98.2 F | OXYGEN SATURATION: 99 % | RESPIRATION RATE: 16 BRPM | HEIGHT: 61 IN | DIASTOLIC BLOOD PRESSURE: 89 MMHG | SYSTOLIC BLOOD PRESSURE: 169 MMHG

## 2021-02-13 DIAGNOSIS — S39.012A STRAIN OF MUSCLE, FASCIA AND TENDON OF LOWER BACK, INITIAL ENCOUNTER: Primary | ICD-10-CM

## 2021-02-13 PROCEDURE — 99213 OFFICE O/P EST LOW 20 MIN: CPT | Performed by: PHYSICIAN ASSISTANT

## 2021-02-13 RX ORDER — PREDNISONE 10 MG/1
TABLET ORAL
Qty: 21 TABLET | Refills: 0 | Status: SHIPPED | OUTPATIENT
Start: 2021-02-13 | End: 2021-06-09 | Stop reason: ALTCHOICE

## 2021-02-13 NOTE — PROGRESS NOTES
[facility] [facility]        NAME: Jt Whiteside is a 28 y o  female  : 1985    MRN: [id number]  DATE: 2021  TIME: 4:35 PM    Assessment and Plan   Strain of muscle, fascia and tendon of lower back, initial encounter [S39 012A]  1  Strain of muscle, fascia and tendon of lower back, initial encounter  predniSONE 10 mg tablet         Patient Instructions     Take prednisone as directed  Recommend RICE, OTC tylenol, ice/heat, light stretching, OTC icyhot/patches, etc   Follow up with PCP in 3-5 days  Proceed to  ER if symptoms worsen  Chief Complaint     Chief Complaint   Patient presents with    Fall     Pt slipped on ice 21  Pt landed on buttocks  Pt c/o lumbar spinal pain 8/10, worsens when turning over in bed, leg extension, getting up from sitting position  Pt reports medial lumbar pain and bilateral leg pain from lateral proximal anterior thigh around to posterior knee down to foot  Denies numbness, tingling, denies head injury  History of Present Illness       Patient presents with complaint of low back pain x 5 days  Pt reports slipping and falling on ice, landing on her buttocks  Pt states that her pain is constant but worse with changes in position, such as standing up from sitting  She denies paresthesias but reports that the pain radiates into her legs  She reports taking aleve and advil intermittently but has not had lasting improvement  Pt denies groin pain, paresthesias, and b/b dysfunction  Review of Systems   Review of Systems   Constitutional: Negative for chills and fever  HENT: Negative for ear pain and sore throat  Eyes: Negative for pain and visual disturbance  Respiratory: Negative for cough and shortness of breath  Cardiovascular: Negative for chest pain and palpitations  Gastrointestinal: Negative for abdominal pain and vomiting  Genitourinary: Negative for dysuria and hematuria  Musculoskeletal: Positive for back pain  Negative for arthralgias  Skin: Negative for color change and rash  Neurological: Negative for seizures and syncope  All other systems reviewed and are negative          Current Medications       Current Outpatient Medications:     amLODIPine (NORVASC) 5 mg tablet, Take 1 tablet (5 mg total) by mouth daily, Disp: 30 tablet, Rfl: 2    atorvastatin (LIPITOR) 20 mg tablet, Take 1 tablet (20 mg total) by mouth daily, Disp: 30 tablet, Rfl: 3    fluticasone (FLONASE) 50 mcg/act nasal spray, 1 spray into each nostril daily for 14 days (Patient not taking: Reported on 2020), Disp: 1 Bottle, Rfl: 2    hydrochlorothiazide (HYDRODIURIL) 25 mg tablet, Take 1 tablet by mouth once daily, Disp: 30 tablet, Rfl: 3    loratadine (CLARITIN) 10 mg tablet, Take 1 tablet (10 mg total) by mouth daily (Patient not taking: Reported on 2020), Disp: 30 tablet, Rfl: 1    losartan (COZAAR) 100 MG tablet, Take 1 tablet by mouth once daily, Disp: 30 tablet, Rfl: 3    omeprazole (PriLOSEC) 20 mg delayed release capsule, Take 1 capsule (20 mg total) by mouth daily (Patient not taking: Reported on 2021), Disp: 14 capsule, Rfl: 0    predniSONE 10 mg tablet, Take 6 tabs on day 1, 5 tabs on day 2, 4 tabs on day 3, 3 tabs on day 4, 2 tabs on day 5, and 1 tab on day 6, Disp: 21 tablet, Rfl: 0    Current Allergies     Allergies as of 2021 - Reviewed 2021   Allergen Reaction Noted    Amoxicillin Rash 10/12/2018            The following portions of the patient's history were reviewed and updated as appropriate: allergies, current medications, past family history, past medical history, past social history, past surgical history and problem list      Past Medical History:   Diagnosis Date    Hypertension        Past Surgical History:   Procedure Laterality Date     SECTION         Family History   Problem Relation Age of Onset    Hypertension Mother     Diabetes Mother     Hypertension Father     Diabetes Father          Medications have been verified  Objective   /89   Pulse 65   Temp 98 2 °F (36 8 °C)   Resp 16   Ht 5' 1" (1 549 m)   Wt 64 7 kg (142 lb 9 6 oz)   SpO2 99%   BMI 26 94 kg/m²   No LMP recorded  Physical Exam     Physical Exam  Vitals signs and nursing note reviewed  Constitutional:       General: She is not in acute distress  Appearance: Normal appearance  She is well-developed  She is not ill-appearing or diaphoretic  HENT:      Head: Normocephalic and atraumatic  Eyes:      Conjunctiva/sclera: Conjunctivae normal       Pupils: Pupils are equal, round, and reactive to light  Neck:      Musculoskeletal: Normal range of motion and neck supple  Cardiovascular:      Rate and Rhythm: Normal rate and regular rhythm  Heart sounds: Normal heart sounds  Pulmonary:      Effort: Pulmonary effort is normal  No respiratory distress  Breath sounds: Normal breath sounds  No stridor  No wheezing, rhonchi or rales  Musculoskeletal: Normal range of motion  General: Tenderness (left lumbar PVMs) present  Comments: L-spine: no skin changes; no midline tenderness; TTP over left PVMs; FAROM, discomfort in all directions; LE sensation and strength intact; negative sitting root test b/l; normal toe and heel walk   Lymphadenopathy:      Cervical: No cervical adenopathy  Skin:     General: Skin is warm and dry  Capillary Refill: Capillary refill takes less than 2 seconds  Findings: No rash  Neurological:      Mental Status: She is alert and oriented to person, place, and time  Cranial Nerves: No cranial nerve deficit  Sensory: No sensory deficit  Psychiatric:         Behavior: Behavior normal          Thought Content:  Thought content normal

## 2021-06-09 ENCOUNTER — OFFICE VISIT (OUTPATIENT)
Dept: FAMILY MEDICINE CLINIC | Facility: CLINIC | Age: 36
End: 2021-06-09
Payer: COMMERCIAL

## 2021-06-09 VITALS
TEMPERATURE: 98.7 F | DIASTOLIC BLOOD PRESSURE: 84 MMHG | RESPIRATION RATE: 16 BRPM | SYSTOLIC BLOOD PRESSURE: 138 MMHG | HEIGHT: 60 IN | HEART RATE: 97 BPM | OXYGEN SATURATION: 98 % | WEIGHT: 145.7 LBS | BODY MASS INDEX: 28.61 KG/M2

## 2021-06-09 DIAGNOSIS — Z13.29 SCREENING FOR THYROID DISORDER: ICD-10-CM

## 2021-06-09 DIAGNOSIS — R14.0 ABDOMINAL BLOATING: ICD-10-CM

## 2021-06-09 DIAGNOSIS — E78.2 MIXED HYPERLIPIDEMIA: ICD-10-CM

## 2021-06-09 DIAGNOSIS — Z00.00 ANNUAL PHYSICAL EXAM: Primary | ICD-10-CM

## 2021-06-09 DIAGNOSIS — I10 ESSENTIAL HYPERTENSION: ICD-10-CM

## 2021-06-09 DIAGNOSIS — Z13.6 SCREENING FOR CARDIOVASCULAR CONDITION: ICD-10-CM

## 2021-06-09 DIAGNOSIS — R10.84 GENERALIZED ABDOMINAL PAIN: ICD-10-CM

## 2021-06-09 DIAGNOSIS — Z11.4 SCREENING FOR HIV (HUMAN IMMUNODEFICIENCY VIRUS): ICD-10-CM

## 2021-06-09 DIAGNOSIS — Z13.1 SCREENING FOR DIABETES MELLITUS: ICD-10-CM

## 2021-06-09 PROCEDURE — 3008F BODY MASS INDEX DOCD: CPT | Performed by: NURSE PRACTITIONER

## 2021-06-09 PROCEDURE — 3725F SCREEN DEPRESSION PERFORMED: CPT | Performed by: NURSE PRACTITIONER

## 2021-06-09 PROCEDURE — 1036F TOBACCO NON-USER: CPT | Performed by: NURSE PRACTITIONER

## 2021-06-09 PROCEDURE — 99395 PREV VISIT EST AGE 18-39: CPT | Performed by: NURSE PRACTITIONER

## 2021-06-09 PROCEDURE — 99213 OFFICE O/P EST LOW 20 MIN: CPT | Performed by: NURSE PRACTITIONER

## 2021-06-09 RX ORDER — LOSARTAN POTASSIUM AND HYDROCHLOROTHIAZIDE 25; 100 MG/1; MG/1
1 TABLET ORAL DAILY
Qty: 90 TABLET | Refills: 1 | Status: SHIPPED | OUTPATIENT
Start: 2021-06-09 | End: 2022-03-29

## 2021-06-09 NOTE — PROGRESS NOTES
Assessment/Plan   Problem List Items Addressed This Visit     None      Visit Diagnoses     Annual physical exam    -  Primary    Mixed hyperlipidemia        Relevant Medications    losartan-hydrochlorothiazide (HYZAAR) 100-25 MG per tablet    Other Relevant Orders    Lipid panel    Comprehensive metabolic panel    Screening for HIV (human immunodeficiency virus)        Relevant Orders    Human Immunodeficiency Virus 1/2 Antigen / Antibody ( Fourth Generation) with Reflex Testing    Screening for diabetes mellitus        Relevant Orders    Comprehensive metabolic panel    UA (URINE) with reflex to Scope    Screening for thyroid disorder        Relevant Orders    TSH, 3rd generation with Free T4 reflex    Screening for cardiovascular condition        Relevant Orders    CBC and differential    Abdominal bloating        Relevant Orders    Ambulatory referral to Gastroenterology    Generalized abdominal pain        Relevant Orders    US abdomen and pelvis with transvaginal    Essential hypertension        Relevant Medications    losartan-hydrochlorothiazide (HYZAAR) 100-25 MG per tablet                Chief Complaint   Patient presents with    Physical Exam     Patient reports abdomenal/pelvic pain for about 3 weeks  She also reports bloating for the last few months   Small feces     Patient reports that her feces are "smaller than normal"    Medication Refill     Flonase, HCTZ, Losartan       Subjective   Patient ID: Mar Foster is a 28 y o  female  Vitals:    06/09/21 1413   BP: 138/84   Pulse: 97   Resp: 16   Temp: 98 7 °F (37 1 °C)   SpO2: 98%     Abdominal Pain  This is a recurrent problem  The current episode started more than 1 month ago  The onset quality is undetermined  The problem occurs intermittently  The problem has been unchanged  The pain is located in the periumbilical region  The pain is at a severity of 5/10  The pain is moderate   The quality of the pain is aching and a sensation of fullness  The abdominal pain does not radiate  Associated symptoms include diarrhea (  "sometimes")  Pertinent negatives include no arthralgias, belching, constipation, frequency, headaches, hematochezia, melena, myalgias, vomiting or weight loss  The pain is aggravated by eating  The pain is relieved by nothing  She has tried nothing for the symptoms  The treatment provided no relief  The following portions of the patient's history were reviewed and updated as appropriate: allergies, current medications, past family history, past medical history, past surgical history and problem list     Review of Systems   Constitutional: Negative  Negative for unexpected weight change and weight loss  HENT: Negative  Eyes: Negative  Respiratory: Negative  Cardiovascular: Negative  Gastrointestinal: Positive for abdominal distention, abdominal pain and diarrhea (  "sometimes")  Negative for constipation, hematochezia, melena and vomiting  Endocrine: Negative  Genitourinary: Negative  Negative for frequency  Musculoskeletal: Negative  Negative for arthralgias and myalgias  Skin: Negative  Allergic/Immunologic: Negative  Neurological: Negative  Negative for headaches  Hematological: Negative  Psychiatric/Behavioral: Negative  Objective     Physical Exam  Vitals signs and nursing note reviewed  Constitutional:       General: She is not in acute distress  Appearance: Normal appearance  HENT:      Head: Normocephalic and atraumatic  Right Ear: Tympanic membrane, ear canal and external ear normal  There is no impacted cerumen  Left Ear: Tympanic membrane, ear canal and external ear normal  There is no impacted cerumen  Nose: Nose normal  No congestion  Mouth/Throat:      Mouth: Mucous membranes are moist       Pharynx: Oropharynx is clear  No oropharyngeal exudate or posterior oropharyngeal erythema  Eyes:      General:         Right eye: No discharge  Left eye: No discharge  Extraocular Movements: Extraocular movements intact  Conjunctiva/sclera: Conjunctivae normal       Pupils: Pupils are equal, round, and reactive to light  Neck:      Musculoskeletal: Normal range of motion and neck supple  No neck rigidity  Vascular: No carotid bruit  Cardiovascular:      Rate and Rhythm: Normal rate and regular rhythm  Pulses: Normal pulses  Heart sounds: Normal heart sounds  No murmur  Pulmonary:      Effort: Pulmonary effort is normal  No respiratory distress  Breath sounds: Normal breath sounds  No wheezing or rhonchi  Abdominal:      General: Bowel sounds are normal       Palpations: Abdomen is soft  Tenderness: There is abdominal tenderness  There is no right CVA tenderness or left CVA tenderness  Comments: Generalized-travels to different spots   Musculoskeletal: Normal range of motion  General: No swelling, tenderness, deformity or signs of injury  Skin:     General: Skin is warm and dry  Capillary Refill: Capillary refill takes less than 2 seconds  Findings: No bruising, erythema or lesion  Neurological:      General: No focal deficit present  Mental Status: She is alert and oriented to person, place, and time  Psychiatric:         Mood and Affect: Mood normal          Behavior: Behavior normal          Thought Content:  Thought content normal          Judgment: Judgment normal        Allergies   Allergen Reactions    Amoxicillin Rash

## 2021-06-09 NOTE — PROGRESS NOTES
[facility]    NAME: Tam Lynn  AGE: 28 y o  SEX: female  : 1985     DATE: 2021     Assessment and Plan:     Problem List Items Addressed This Visit     None      Visit Diagnoses     Annual physical exam    -  Primary    Mixed hyperlipidemia        Relevant Orders    Lipid panel    Comprehensive metabolic panel    Screening for HIV (human immunodeficiency virus)        Relevant Orders    Human Immunodeficiency Virus 1/2 Antigen / Antibody ( Fourth Generation) with Reflex Testing    Screening for diabetes mellitus        Relevant Orders    Comprehensive metabolic panel    UA (URINE) with reflex to Scope    Screening for thyroid disorder        Relevant Orders    TSH, 3rd generation with Free T4 reflex    Screening for cardiovascular condition        Relevant Orders    CBC and differential          Immunizations and preventive care screenings were discussed with patient today  Appropriate education was printed on patient's after visit summary  Counseling:  Alcohol/drug use: discussed moderation in alcohol intake, the recommendations for healthy alcohol use, and avoidance of illicit drug use  Dental Health: discussed importance of regular tooth brushing, flossing, and dental visits  Injury prevention: discussed safety/seat belts, safety helmets, smoke detectors, carbon dioxide detectors, and smoking near bedding or upholstery  Sexual health: discussed sexually transmitted diseases, partner selection, use of condoms, avoidance of unintended pregnancy, and contraceptive alternatives  · Exercise: the importance of regular exercise/physical activity was discussed  Recommend exercise 3-5 times per week for at least 30 minutes  BMI Counseling: Body mass index is 28 46 kg/m²   The BMI is above normal  Nutrition recommendations include decreasing portion sizes, encouraging healthy choices of fruits and vegetables, decreasing fast food intake, consuming healthier snacks, limiting drinks that contain sugar, moderation in carbohydrate intake, increasing intake of lean protein, reducing intake of saturated and trans fat and reducing intake of cholesterol  Exercise recommendations include moderate physical activity 150 minutes/week and strength training exercises  Depression Screening and Follow-up Plan: Patient's depression screening was positive with a PHQ-2 score of 0  Clincally patient does not have depression  No treatment is required  Return in 6 months (on 12/9/2021)  Chief Complaint:     Chief Complaint   Patient presents with    Physical Exam     Patient reports abdomenal/pelvic pain for about 3 weeks  She also reports bloating for the last few months   Small feces     Patient reports that her feces are "smaller than normal"    Medication Refill     Flonase, HCTZ, Losartan      History of Present Illness:     Adult Annual Physical   Patient here for a comprehensive physical exam  The patient reports problems - pain in stomach, started a few months ago  Diet and Physical Activity  · Diet/Nutrition: well balanced diet and consuming 3-5 servings of fruits/vegetables daily  · Exercise: no formal exercise  Depression Screening  PHQ-9 Depression Screening    PHQ-9:   Frequency of the following problems over the past two weeks:      Little interest or pleasure in doing things: 0 - not at all  Feeling down, depressed, or hopeless: 0 - not at all  PHQ-2 Score: 0       General Health  · Sleep: sleeps well  · Hearing: normal - bilateral   · Vision: no vision problems, goes for regular eye exams and wears glasses  · Dental: regular dental visits, brushes teeth twice daily and flosses teeth occasionally  /GYN Health  · Last menstrual period: last month  · Contraceptive method: none  · History of STDs?: no      Review of Systems:     Review of Systems   Constitutional: Negative  HENT: Negative  Eyes: Negative  Respiratory: Negative  Cardiovascular: Negative  Gastrointestinal: Positive for abdominal distention, abdominal pain and diarrhea  Negative for blood in stool  Endocrine: Negative  Genitourinary: Negative  Musculoskeletal: Negative  Skin: Negative  Allergic/Immunologic: Negative  Neurological: Negative  Hematological: Negative  Psychiatric/Behavioral: Negative         Past Medical History:     Past Medical History:   Diagnosis Date    Hypertension       Past Surgical History:     Past Surgical History:   Procedure Laterality Date     SECTION        Social History:     E-Cigarette/Vaping    E-Cigarette Use Never User      E-Cigarette/Vaping Substances    Nicotine No     Flavoring No      Social History     Socioeconomic History    Marital status: /Civil Union     Spouse name: None    Number of children: None    Years of education: None    Highest education level: None   Occupational History    None   Social Needs    Financial resource strain: None    Food insecurity     Worry: None     Inability: None    Transportation needs     Medical: None     Non-medical: None   Tobacco Use    Smoking status: Former Smoker    Smokeless tobacco: Never Used   Substance and Sexual Activity    Alcohol use: Not Currently     Frequency: Monthly or less     Drinks per session: 1 or 2     Binge frequency: Never     Comment: Social     Drug use: Never    Sexual activity: Yes     Partners: Male     Birth control/protection: None   Lifestyle    Physical activity     Days per week: 5 days     Minutes per session: 120 min    Stress: Not at all   Relationships    Social connections     Talks on phone: None     Gets together: None     Attends Zoroastrianism service: None     Active member of club or organization: None     Attends meetings of clubs or organizations: None     Relationship status:     Intimate partner violence     Fear of current or ex partner: No     Emotionally abused: No     Physically abused: No     Forced sexual activity: No   Other Topics Concern    None   Social History Narrative    Nonsmoker - as per [employer]       Family History:     Family History   Problem Relation Age of Onset    Hypertension Mother     Diabetes Mother     Hypertension Father     Diabetes Father       Current Medications:     Current Outpatient Medications   Medication Sig Dispense Refill    amLODIPine (NORVASC) 5 mg tablet Take 1 tablet (5 mg total) by mouth daily 30 tablet 2    hydrochlorothiazide (HYDRODIURIL) 25 mg tablet Take 1 tablet by mouth once daily 30 tablet 3    loratadine (CLARITIN) 10 mg tablet Take 1 tablet (10 mg total) by mouth daily 30 tablet 1    losartan (COZAAR) 100 MG tablet Take 1 tablet by mouth once daily 30 tablet 3    omeprazole (PriLOSEC) 20 mg delayed release capsule Take 1 capsule (20 mg total) by mouth daily 14 capsule 0     No current facility-administered medications for this visit  Allergies: Allergies   Allergen Reactions    Amoxicillin Rash      Physical Exam:     /84 (BP Location: Left arm, Patient Position: Sitting, Cuff Size: Standard)   Pulse 97   Temp 98 7 °F (37 1 °C) (Tympanic)   Resp 16   Ht 5' (1 524 m)   Wt 66 1 kg (145 lb 11 2 oz)   LMP 05/20/2021 (Approximate)   SpO2 98%   BMI 28 46 kg/m²     Physical Exam  Vitals signs and nursing note reviewed  Constitutional:       General: She is not in acute distress  Appearance: Normal appearance  She is normal weight  She is not ill-appearing  HENT:      Head: Normocephalic and atraumatic  Right Ear: Tympanic membrane, ear canal and external ear normal  There is no impacted cerumen  Left Ear: Tympanic membrane, ear canal and external ear normal  There is no impacted cerumen  Nose: Nose normal  No congestion  Mouth/Throat:      Mouth: Mucous membranes are moist       Pharynx: Oropharynx is clear   No oropharyngeal exudate  Eyes:      General:         Right eye: No discharge  Left eye: No discharge  Extraocular Movements: Extraocular movements intact  Conjunctiva/sclera: Conjunctivae normal       Pupils: Pupils are equal, round, and reactive to light  Neck:      Musculoskeletal: Normal range of motion and neck supple  No neck rigidity or muscular tenderness  Cardiovascular:      Rate and Rhythm: Normal rate and regular rhythm  Pulses: Normal pulses  Heart sounds: Normal heart sounds  No murmur  Pulmonary:      Effort: Pulmonary effort is normal  No respiratory distress  Breath sounds: Normal breath sounds  No wheezing or rhonchi  Abdominal:      General: Bowel sounds are normal  There is no distension  Palpations: Abdomen is soft  Tenderness: There is generalized abdominal tenderness  There is no right CVA tenderness, left CVA tenderness, guarding or rebound  Negative signs include McBurney's sign and psoas sign  Hernia: No hernia is present  Musculoskeletal: Normal range of motion  General: No tenderness or deformity  Right lower leg: No edema  Left lower leg: No edema  Lymphadenopathy:      Cervical: No cervical adenopathy  Skin:     General: Skin is warm and dry  Capillary Refill: Capillary refill takes less than 2 seconds  Neurological:      Mental Status: She is alert and oriented to person, place, and time  Psychiatric:         Mood and Affect: Mood normal          Behavior: Behavior normal          Thought Content:  Thought content normal          Judgment: Judgment normal           Macy Brown, [facility]

## 2021-06-09 NOTE — PATIENT INSTRUCTIONS

## 2021-06-17 DIAGNOSIS — E78.2 MIXED HYPERLIPIDEMIA: Primary | ICD-10-CM

## 2021-06-17 LAB
ALBUMIN SERPL-MCNC: 4.8 G/DL (ref 3.8–4.8)
ALBUMIN/GLOB SERPL: 1.8 {RATIO} (ref 1.2–2.2)
ALP SERPL-CCNC: 48 IU/L (ref 48–121)
ALT SERPL-CCNC: 9 IU/L (ref 0–32)
APPEARANCE UR: ABNORMAL
AST SERPL-CCNC: 12 IU/L (ref 0–40)
BASOPHILS # BLD AUTO: 0.1 X10E3/UL (ref 0–0.2)
BASOPHILS NFR BLD AUTO: 1 %
BILIRUB SERPL-MCNC: 0.3 MG/DL (ref 0–1.2)
BILIRUB UR QL STRIP: NEGATIVE
BUN SERPL-MCNC: 19 MG/DL (ref 6–20)
BUN/CREAT SERPL: 22 (ref 9–23)
CALCIUM SERPL-MCNC: 9.5 MG/DL (ref 8.7–10.2)
CHLORIDE SERPL-SCNC: 101 MMOL/L (ref 96–106)
CHOLEST SERPL-MCNC: 237 MG/DL (ref 100–199)
CHOLEST/HDLC SERPL: 4.5 RATIO (ref 0–4.4)
CO2 SERPL-SCNC: 22 MMOL/L (ref 20–29)
COLOR UR: YELLOW
CREAT SERPL-MCNC: 0.85 MG/DL (ref 0.57–1)
EOSINOPHIL # BLD AUTO: 0.3 X10E3/UL (ref 0–0.4)
EOSINOPHIL NFR BLD AUTO: 3 %
ERYTHROCYTE [DISTWIDTH] IN BLOOD BY AUTOMATED COUNT: 12.3 % (ref 11.7–15.4)
GLOBULIN SER-MCNC: 2.6 G/DL (ref 1.5–4.5)
GLUCOSE SERPL-MCNC: 97 MG/DL (ref 65–99)
GLUCOSE UR QL: NEGATIVE
HCT VFR BLD AUTO: 39 % (ref 34–46.6)
HDLC SERPL-MCNC: 53 MG/DL
HGB BLD-MCNC: 13.1 G/DL (ref 11.1–15.9)
HGB UR QL STRIP: NEGATIVE
HIV 1+2 AB+HIV1 P24 AG SERPL QL IA: NON REACTIVE
IMM GRANULOCYTES # BLD: 0 X10E3/UL (ref 0–0.1)
IMM GRANULOCYTES NFR BLD: 0 %
KETONES UR QL STRIP: NEGATIVE
LDLC SERPL CALC-MCNC: 156 MG/DL (ref 0–99)
LEUKOCYTE ESTERASE UR QL STRIP: NEGATIVE
LYMPHOCYTES # BLD AUTO: 4.3 X10E3/UL (ref 0.7–3.1)
LYMPHOCYTES NFR BLD AUTO: 45 %
MCH RBC QN AUTO: 30.5 PG (ref 26.6–33)
MCHC RBC AUTO-ENTMCNC: 33.6 G/DL (ref 31.5–35.7)
MCV RBC AUTO: 91 FL (ref 79–97)
MICRO URNS: ABNORMAL
MONOCYTES # BLD AUTO: 0.5 X10E3/UL (ref 0.1–0.9)
MONOCYTES NFR BLD AUTO: 6 %
NEUTROPHILS # BLD AUTO: 4.3 X10E3/UL (ref 1.4–7)
NEUTROPHILS NFR BLD AUTO: 45 %
NITRITE UR QL STRIP: NEGATIVE
PH UR STRIP: 6 [PH] (ref 5–7.5)
PLATELET # BLD AUTO: 378 X10E3/UL (ref 150–450)
POTASSIUM SERPL-SCNC: 4.8 MMOL/L (ref 3.5–5.2)
PROT SERPL-MCNC: 7.4 G/DL (ref 6–8.5)
PROT UR QL STRIP: NEGATIVE
RBC # BLD AUTO: 4.29 X10E6/UL (ref 3.77–5.28)
SL AMB EGFR AFRICAN AMERICAN: 103 ML/MIN/1.73
SL AMB EGFR NON AFRICAN AMERICAN: 89 ML/MIN/1.73
SL AMB VLDL CHOLESTEROL CALC: 28 MG/DL (ref 5–40)
SODIUM SERPL-SCNC: 136 MMOL/L (ref 134–144)
SP GR UR: >=1.03 (ref 1–1.03)
TRIGL SERPL-MCNC: 153 MG/DL (ref 0–149)
TSH SERPL DL<=0.005 MIU/L-ACNC: 0.92 UIU/ML (ref 0.45–4.5)
UROBILINOGEN UR STRIP-ACNC: 0.2 MG/DL (ref 0.2–1)
WBC # BLD AUTO: 9.4 X10E3/UL (ref 3.4–10.8)

## 2021-06-17 RX ORDER — ATORVASTATIN CALCIUM 40 MG/1
40 TABLET, FILM COATED ORAL DAILY
Qty: 30 TABLET | Refills: 2 | Status: SHIPPED | OUTPATIENT
Start: 2021-06-17 | End: 2021-08-02

## 2021-06-17 NOTE — PROGRESS NOTES
Patient stopped taking atorvastatin 20 mg daily, lipid panel has elevated in past 8 months, ordering atorvastatin 40 mg PO daily and repeat lipid panel in 4-6 months

## 2021-07-06 ENCOUNTER — HOSPITAL ENCOUNTER (OUTPATIENT)
Dept: ULTRASOUND IMAGING | Facility: HOSPITAL | Age: 36
Discharge: HOME | End: 2021-07-06
Payer: COMMERCIAL

## 2021-07-06 DIAGNOSIS — R10.84 GENERALIZED ABDOMINAL PAIN: ICD-10-CM

## 2021-07-06 PROCEDURE — 76856 US EXAM PELVIC COMPLETE: CPT

## 2021-07-06 PROCEDURE — 76830 TRANSVAGINAL US NON-OB: CPT

## 2021-07-06 PROCEDURE — 76700 US EXAM ABDOM COMPLETE: CPT

## 2021-08-02 ENCOUNTER — OFFICE VISIT (OUTPATIENT)
Dept: FAMILY MEDICINE CLINIC | Facility: CLINIC | Age: 36
End: 2021-08-02
Payer: COMMERCIAL

## 2021-08-02 VITALS
TEMPERATURE: 99.2 F | DIASTOLIC BLOOD PRESSURE: 76 MMHG | OXYGEN SATURATION: 99 % | RESPIRATION RATE: 16 BRPM | SYSTOLIC BLOOD PRESSURE: 110 MMHG | WEIGHT: 146 LBS | HEART RATE: 99 BPM | HEIGHT: 60 IN | BODY MASS INDEX: 28.66 KG/M2

## 2021-08-02 DIAGNOSIS — E78.41 ELEVATED LIPOPROTEIN(A): Primary | ICD-10-CM

## 2021-08-02 DIAGNOSIS — R21 RASH: ICD-10-CM

## 2021-08-02 PROCEDURE — 3008F BODY MASS INDEX DOCD: CPT | Performed by: NURSE PRACTITIONER

## 2021-08-02 PROCEDURE — 1036F TOBACCO NON-USER: CPT | Performed by: NURSE PRACTITIONER

## 2021-08-02 PROCEDURE — 99214 OFFICE O/P EST MOD 30 MIN: CPT | Performed by: NURSE PRACTITIONER

## 2021-08-02 PROCEDURE — 3725F SCREEN DEPRESSION PERFORMED: CPT | Performed by: NURSE PRACTITIONER

## 2021-08-02 RX ORDER — CLOTRIMAZOLE 1 %
CREAM (GRAM) TOPICAL 2 TIMES DAILY
Qty: 40 G | Refills: 1 | Status: SHIPPED | OUTPATIENT
Start: 2021-08-02 | End: 2022-04-04 | Stop reason: HOSPADM

## 2021-08-02 RX ORDER — ROSUVASTATIN CALCIUM 10 MG/1
10 TABLET, COATED ORAL DAILY
Qty: 30 TABLET | Refills: 1 | Status: SHIPPED | OUTPATIENT
Start: 2021-08-02 | End: 2021-11-12 | Stop reason: SDUPTHER

## 2021-08-02 NOTE — PROGRESS NOTES
Assessment/Plan:    No problem-specific Assessment & Plan notes found for this encounter  Problem List Items Addressed This Visit     None      Visit Diagnoses     Elevated lipoprotein(a)    -  Primary    Relevant Medications    rosuvastatin (CRESTOR) 10 MG tablet    Rash        Relevant Medications    clotrimazole (LOTRIMIN) 1 % cream            Subjective:      Patient ID: Susan Foss is a 28 y o  female  Plan to stop atorvastatin and see if the myalgia passes -then begin crestor 10 mg daily, call in myalgia returns   Advised to begin Claritin and Flonase daily for seasonal allergies      Hyperlipidemia  This is a chronic (patient has been using atorvastatin "on and off" for past year, has developed "muscle pain" "all over" ) problem  The current episode started more than 1 year ago  The problem is resistant  Recent lipid tests were reviewed and are high  There are no known factors aggravating her hyperlipidemia  Associated symptoms include myalgias  Pertinent negatives include no chest pain, focal sensory loss or shortness of breath  Current antihyperlipidemic treatment includes statins  The current treatment provides moderate improvement of lipids  Compliance problems include adherence to exercise, adherence to diet and medication side effects  Risk factors for coronary artery disease include dyslipidemia, a sedentary lifestyle and stress  Rash  This is a recurrent problem  The current episode started 1 to 4 weeks ago  The problem has been waxing and waning since onset  Location: buttocks  The rash is characterized by dryness, redness and itchiness  She was exposed to nothing  Pertinent negatives include no congestion, cough, diarrhea, fever, shortness of breath or sore throat  Past treatments include topical steroids  The treatment provided moderate (but returns) relief  There is no history of allergies, asthma, eczema or varicella  Sinus Problem  This is a recurrent problem   The current episode started 1 to 4 weeks ago  The problem has been waxing and waning since onset  There has been no fever  Her pain is at a severity of 5/10  Associated symptoms include sinus pressure  Pertinent negatives include no chills, congestion, coughing, diaphoresis, ear pain, hoarse voice, shortness of breath, sneezing, sore throat or swollen glands  Past treatments include nothing  The treatment provided no relief  The following portions of the patient's history were reviewed and updated as appropriate: allergies, current medications, past medical history, past social history, past surgical history and problem list     Review of Systems   Constitutional: Negative  Negative for chills, diaphoresis and fever  HENT: Positive for sinus pressure  Negative for congestion, ear pain, hoarse voice, sneezing and sore throat  Eyes: Negative  Respiratory: Negative  Negative for cough and shortness of breath  Cardiovascular: Negative  Negative for chest pain  Gastrointestinal: Negative  Negative for diarrhea  Endocrine: Negative  Genitourinary: Negative  Musculoskeletal: Positive for myalgias  Skin: Positive for rash  Allergic/Immunologic: Negative  Neurological: Negative  Hematological: Negative  Psychiatric/Behavioral: Negative  Objective:      /76 (BP Location: Left arm, Patient Position: Sitting, Cuff Size: Large)   Pulse 99   Temp 99 2 °F (37 3 °C) (Tympanic)   Resp 16   Ht 5' (1 524 m)   Wt 66 2 kg (146 lb)   LMP 07/16/2021   SpO2 99%   BMI 28 51 kg/m²          Physical Exam  Vitals and nursing note reviewed  Constitutional:       General: She is not in acute distress  Appearance: Normal appearance  She is not ill-appearing  HENT:      Head: Normocephalic and atraumatic  Right Ear: Tympanic membrane, ear canal and external ear normal  There is no impacted cerumen        Left Ear: Tympanic membrane, ear canal and external ear normal  There is no impacted cerumen  Nose: No congestion  Comments: Nasal turbinates pale and puffy      Mouth/Throat:      Mouth: Mucous membranes are moist       Pharynx: Oropharynx is clear  No oropharyngeal exudate or posterior oropharyngeal erythema  Comments: Post nasal drainage noted   Eyes:      General:         Right eye: No discharge  Left eye: No discharge  Extraocular Movements: Extraocular movements intact  Conjunctiva/sclera: Conjunctivae normal    Neck:      Vascular: No carotid bruit  Cardiovascular:      Rate and Rhythm: Normal rate and regular rhythm  Pulses: Normal pulses  Heart sounds: Normal heart sounds  No murmur heard  Pulmonary:      Effort: Pulmonary effort is normal  No respiratory distress  Breath sounds: Normal breath sounds  Abdominal:      General: Abdomen is flat  Bowel sounds are normal       Palpations: Abdomen is soft  Musculoskeletal:         General: No tenderness  Normal range of motion  Cervical back: Normal range of motion and neck supple  No rigidity or tenderness  Right lower leg: No edema  Lymphadenopathy:      Cervical: No cervical adenopathy  Skin:     General: Skin is warm and dry  Findings: Rash present  Bruising: there is a dime size cluster of red raised papuples in a circular pattern  Neurological:      General: No focal deficit present  Mental Status: She is alert and oriented to person, place, and time  Psychiatric:         Mood and Affect: Mood normal          Behavior: Behavior normal          Thought Content:  Thought content normal          Judgment: Judgment normal          Allergies   Allergen Reactions    Amoxicillin Rash

## 2021-08-25 ENCOUNTER — OFFICE VISIT (OUTPATIENT)
Dept: GASTROENTEROLOGY | Facility: CLINIC | Age: 36
End: 2021-08-25
Payer: COMMERCIAL

## 2021-08-25 VITALS
SYSTOLIC BLOOD PRESSURE: 128 MMHG | HEART RATE: 83 BPM | DIASTOLIC BLOOD PRESSURE: 82 MMHG | WEIGHT: 150 LBS | BODY MASS INDEX: 29.45 KG/M2 | HEIGHT: 60 IN

## 2021-08-25 DIAGNOSIS — R10.32 LEFT LOWER QUADRANT ABDOMINAL PAIN: Primary | ICD-10-CM

## 2021-08-25 DIAGNOSIS — R14.0 ABDOMINAL BLOATING: ICD-10-CM

## 2021-08-25 PROCEDURE — 1036F TOBACCO NON-USER: CPT | Performed by: NURSE PRACTITIONER

## 2021-08-25 PROCEDURE — 3008F BODY MASS INDEX DOCD: CPT | Performed by: NURSE PRACTITIONER

## 2021-08-25 PROCEDURE — 99203 OFFICE O/P NEW LOW 30 MIN: CPT | Performed by: NURSE PRACTITIONER

## 2021-08-25 NOTE — PROGRESS NOTES
[facility] - Outpatient Follow-up Note  Vivi Bound 28 y o  female MRN: [id number]  Encounter: [id number]    ASSESSMENT AND PLAN:      1  Left lower quadrant abdominal pain  2  Abdominal bloating  1 year history of intermittent left lower quadrant abdominal pain and bloating, usually associated with food ingestion  No recent alarm symptoms  No relief with bowel movement  She does notice some recent improvement with increasing her dietary fiber intake  On exam, she has left lower quadrant tenderness with palpation  Differential diagnosis include IBD/colitis or IBS  Need to rule out malignancy  Would also consider gyn etiology given her age  - Because of tenderness with palpation on exam, will obtain CT abdomen and pelvis with contrast   If no abnormal findings and her pain persists, will consider proceeding with colonoscopy  -recommend high-fiber diet and increasing daily fluid intake            Followup Appointment: 2 months  ______________________________________________________________________    Chief Complaint   Patient presents with    Bloating    Constipation     HPI:  43-year-old female presents with 1 year history of intermittent left upper and lower abdominal pain with associated abdominal bloating  Pain occurs 2-3 times per week, usually after eating  She describes dull achiness and tightness which is not relieved with passing flatus or with bowel movement  Pain improves with lying down and gradually resolves after several hours without treatment  She has noticed some recent improvement in the frequency of pain since she began increasing dietary fiber  She moves her bowels 1-2 times per day and reports formed brown stools  Denies recent constipation or diarrhea  Denies melena or hematochezia, no recent change in her bowel habits  Pain is not related to her menstrual cycle    She denies dysphagia, no recent change in her appetite and no unintentional weight loss  No heartburn or reflux symptoms  Recent labs reviewed, hemoglobin stable at 13 1    Historical Information   Past Medical History:   Diagnosis Date    Hypertension      Past Surgical History:   Procedure Laterality Date     SECTION       Social History     Substance and Sexual Activity   Alcohol Use Not Currently    Comment: Social      Social History     Substance and Sexual Activity   Drug Use Never     Social History     Tobacco Use   Smoking Status Former Smoker   Smokeless Tobacco Never Used     Family History   Problem Relation Age of Onset    Hypertension Mother     Diabetes Mother     Hypertension Father     Diabetes Father     Colon polyps Neg Hx     Colon cancer Neg Hx          Current Outpatient Medications:     clotrimazole (LOTRIMIN) 1 % cream    losartan-hydrochlorothiazide (HYZAAR) 100-25 MG per tablet    rosuvastatin (CRESTOR) 10 MG tablet    loratadine (CLARITIN) 10 mg tablet  Allergies   Allergen Reactions    Amoxicillin Rash     Reviewed medications and allergies and updated as indicated    PHYSICAL EXAM:    Blood pressure 128/82, pulse 83, height 5' (1 524 m), weight 68 kg (150 lb), not currently breastfeeding  Body mass index is 29 29 kg/m²  General Appearance: NAD, cooperative, alert  Eyes: Anicteric  ENT:  Normocephalic, atraumatic, normal mucosa  Neck:  Supple, symmetrical, trachea midline  Resp:  Clear to auscultation bilaterally; no rales, rhonchi or wheezing; respirations unlabored   CV:  S1 S2, Regular rate and rhythm; no murmur, rub, or gallop  GI:  Soft,  non-distended, tender at left lower quadrant with palpation; normal bowel sounds; no masses, no organomegaly   Rectal: Deferred  Musculoskeletal: No cyanosis, clubbing or edema  Normal ROM    Skin:  No jaundice, rashes, or lesions   Psych: Normal affect, good eye contact  Neuro: No gross deficits, AAOx3    Lab Results:   Lab Results   Component Value Date    WBC 9 4 2021    HGB 13 1 06/16/2021    HCT 39 0 06/16/2021    MCV 91 06/16/2021     06/16/2021     Lab Results   Component Value Date    K 4 8 06/16/2021     06/16/2021    CO2 22 06/16/2021    BUN 19 06/16/2021    CREATININE 0 85 06/16/2021    GLUF 89 07/14/2020    CALCIUM 9 7 07/14/2020    AST 12 06/16/2021    ALT 9 06/16/2021    ALKPHOS 48 07/14/2020    EGFR 86 07/14/2020

## 2021-08-25 NOTE — LETTER
August 25, 2021     Jess Mckee, [address] [place] [address] [address] [place] [address]    Patient: Stephon Tinoco   YOB: 1985   Date of Visit: 8/25/2021       Dear Dr Abdelrahman Logan: Thank you for referring Catrina Cardenas to me for evaluation  Below are my notes for this consultation  If you have questions, please do not hesitate to call me  I look forward to following your patient along with you  Sincerely,        JOSE Mcmillan        CC: No Recipients  JOSE Mcmillan  8/25/2021  4:46 PM  Sign when Signing Visit  [id number] - Outpatient Follow-up Note  Stephon Tinoco 28 y o  female MRN: [id number]  Encounter: [id number]    ASSESSMENT AND PLAN:      1  Left lower quadrant abdominal pain  2  Abdominal bloating  1 year history of intermittent left lower quadrant abdominal pain and bloating, usually associated with food ingestion  No recent alarm symptoms  No relief with bowel movement  She does notice some recent improvement with increasing her dietary fiber intake  On exam, she has left lower quadrant tenderness with palpation  Differential diagnosis include IBD/colitis or IBS  Need to rule out malignancy  Would also consider gyn etiology given her age  - Because of tenderness with palpation on exam, will obtain CT abdomen and pelvis with contrast   If no abnormal findings and her pain persists, will consider proceeding with colonoscopy  -recommend high-fiber diet and increasing daily fluid intake            Followup Appointment: 2 months  ______________________________________________________________________    Chief Complaint   Patient presents with    Bloating    Constipation     HPI:  20-year-old female presents with 1 year history of intermittent left upper and lower abdominal pain with associated abdominal bloating  Pain occurs 2-3 times per week, usually after eating    She describes dull achiness and tightness which is not relieved with passing flatus or with bowel movement  Pain improves with lying down and gradually resolves after several hours without treatment  She has noticed some recent improvement in the frequency of pain since she began increasing dietary fiber  She moves her bowels 1-2 times per day and reports formed brown stools  Denies recent constipation or diarrhea  Denies melena or hematochezia, no recent change in her bowel habits  Pain is not related to her menstrual cycle  She denies dysphagia, no recent change in her appetite and no unintentional weight loss  No heartburn or reflux symptoms  Recent labs reviewed, hemoglobin stable at 13 1    Historical Information   Past Medical History:   Diagnosis Date    Hypertension      Past Surgical History:   Procedure Laterality Date     SECTION       Social History     Substance and Sexual Activity   Alcohol Use Not Currently    Comment: Social      Social History     Substance and Sexual Activity   Drug Use Never     Social History     Tobacco Use   Smoking Status Former Smoker   Smokeless Tobacco Never Used     Family History   Problem Relation Age of Onset    Hypertension Mother     Diabetes Mother     Hypertension Father     Diabetes Father     Colon polyps Neg Hx     Colon cancer Neg Hx          Current Outpatient Medications:     clotrimazole (LOTRIMIN) 1 % cream    losartan-hydrochlorothiazide (HYZAAR) 100-25 MG per tablet    rosuvastatin (CRESTOR) 10 MG tablet    loratadine (CLARITIN) 10 mg tablet  Allergies   Allergen Reactions    Amoxicillin Rash     Reviewed medications and allergies and updated as indicated    PHYSICAL EXAM:    Blood pressure 128/82, pulse 83, height 5' (1 524 m), weight 68 kg (150 lb), not currently breastfeeding  Body mass index is 29 29 kg/m²  General Appearance: NAD, cooperative, alert  Eyes: Anicteric  ENT:  Normocephalic, atraumatic, normal mucosa      Neck:  Supple, symmetrical, trachea midline  Resp:  Clear to auscultation bilaterally; no rales, rhonchi or wheezing; respirations unlabored   CV:  S1 S2, Regular rate and rhythm; no murmur, rub, or gallop  GI:  Soft,  non-distended, tender at left lower quadrant with palpation; normal bowel sounds; no masses, no organomegaly   Rectal: Deferred  Musculoskeletal: No cyanosis, clubbing or edema  Normal ROM    Skin:  No jaundice, rashes, or lesions   Psych: Normal affect, good eye contact  Neuro: No gross deficits, AAOx3    Lab Results:   Lab Results   Component Value Date    WBC 9 4 06/16/2021    HGB 13 1 06/16/2021    HCT 39 0 06/16/2021    MCV 91 06/16/2021     06/16/2021     Lab Results   Component Value Date    K 4 8 06/16/2021     06/16/2021    CO2 22 06/16/2021    BUN 19 06/16/2021    CREATININE 0 85 06/16/2021    GLUF 89 07/14/2020    CALCIUM 9 7 07/14/2020    AST 12 06/16/2021    ALT 9 06/16/2021    ALKPHOS 48 07/14/2020    EGFR 86 07/14/2020

## 2021-08-25 NOTE — PATIENT INSTRUCTIONS
Dieta con alto contenido de Bee[place]   LO QUE NECESITA SABER:   Karol Funes dieta con alto contenido de fibra incluye alimentos con francisca livan cantidad de fibra  La fibra es la parte de las frutas, los vegetales y los granos, que no se descompone al ser ingerida por gomez cuerpo  La fibra ayuda a regular las evacuaciones intestinales  La fibra además ayuda a bajar el colesterol, controlar la glucosa en la nirali en las personas que sufren de diabetes y para aliviar el estreñimiento  También la fibra podría ayudarle a controlar gomez peso debido a que le da la sensación de llenura más rápidamente  La mayoría de los adultos deberían consumir entre 25 a 35 gramos de fibra al día  Consulte con gomez dietista o médico sobre la cantidad de fibra que usted necesita  [facility]:   Buenas walton de fibra:      · Alimentos que contienen al menos 4 gramos de fibra por porción:     ? ? a ½ de francisca taza de cereal con alto contenido de fibra (pablo la etiqueta nutricional en la caja)    ? ½ taza de moras o frambuesas    ? 4 ciruelas pasas    ? 1 alcachofa cocida    ? ½ taza de legumbres cocidas, gurpreet lentejas o frijoles rojos o pintos    · [employer] contienen 1 a 3 gramos de Simi por porción:     ? 1 rebanada de pan de jaquan integral, pan integral de arteaga o pan de arteaga    ? ½ taza de arroz integral cocido    ? 4 galletas integrales    ? 1 taza de justin    ? ½ taza de cereal con 1 a 3 gramos de fibra por porción (pablo la etiqueta nutricional en la caja)    ? 1 porción pequeña de fruta gurpreet manzana, banana, ann, kiwi o naranja    ? 3 dátiles    ? ½ taza de albaricoques enlatados, ensalada de frutas, duraznos o peras    ? ½ taza de verduras crudas o cocidas, gurpreet zanahorias, coliflor, repollo, espinaca, calabaza o maíz  Formas en que puede aumentar la fibra en gomez dieta:  · Escoja arroz integral o julian en vez de arroz mueller     · Use harina de grano integral en las recetas en vez de harina pablo      · Theadore Meuse legumbres y arvejas a los guisos o las sopas  · Suman Collie y verduras frescas con la cascara en vez de jugos  Otras pautas dietéticas que debe seguir:  · Balinda Forester a gomez dieta lentamente  Usted podría presentar malestar o inflamación estomacal y gases si añade fibra a gomez dieta demasiado rápido  · Lynne mucho líquido a medida que agrega fibra a gomez dieta  Es posible que tenga náuseas o desarrolle estreñimiento si no primitivo suficiente agua  Pregunte cuánto líquido debe lior cada día y cuáles líquidos son los más adecuados para usted  © [facility] 2021 Information is for End User's use only and may not be sold, redistributed or otherwise used for commercial purposes  All illustrations and images included in CareNotes® are the copyrighted property of A D A M , [facility]  or [address] [place] [address] es sólo para uso en educación  Gomez intención no es darle un consejo médico sobre enfermedades o tratamientos  Colsulte con gomez Linda Boers farmacéutico antes de seguir cualquier régimen médico para saber si es seguro y efectivo para usted

## 2021-10-28 ENCOUNTER — CLINICAL SUPPORT (OUTPATIENT)
Dept: FAMILY MEDICINE CLINIC | Facility: CLINIC | Age: 36
End: 2021-10-28

## 2021-10-28 DIAGNOSIS — R05.9 COUGH: ICD-10-CM

## 2021-10-28 DIAGNOSIS — R09.81 CONGESTION OF NASAL SINUS: Primary | ICD-10-CM

## 2021-10-28 PROCEDURE — U0005 INFEC AGEN DETEC AMPLI PROBE: HCPCS | Performed by: NURSE PRACTITIONER

## 2021-10-28 PROCEDURE — U0003 INFECTIOUS AGENT DETECTION BY NUCLEIC ACID (DNA OR RNA); SEVERE ACUTE RESPIRATORY SYNDROME CORONAVIRUS 2 (SARS-COV-2) (CORONAVIRUS DISEASE [COVID-19]), AMPLIFIED PROBE TECHNIQUE, MAKING USE OF HIGH THROUGHPUT TECHNOLOGIES AS DESCRIBED BY CMS-2020-01-R: HCPCS | Performed by: NURSE PRACTITIONER

## 2021-10-29 LAB — SARS-COV-2 RNA RESP QL NAA+PROBE: POSITIVE

## 2021-11-12 DIAGNOSIS — E78.41 ELEVATED LIPOPROTEIN(A): ICD-10-CM

## 2021-11-12 RX ORDER — ROSUVASTATIN CALCIUM 10 MG/1
10 TABLET, COATED ORAL DAILY
Qty: 30 TABLET | Refills: 1 | Status: SHIPPED | OUTPATIENT
Start: 2021-11-12 | End: 2022-02-01 | Stop reason: SDUPTHER

## 2022-02-01 DIAGNOSIS — E78.41 ELEVATED LIPOPROTEIN(A): ICD-10-CM

## 2022-02-01 RX ORDER — ROSUVASTATIN CALCIUM 10 MG/1
10 TABLET, COATED ORAL DAILY
Qty: 30 TABLET | Refills: 1 | Status: SHIPPED | OUTPATIENT
Start: 2022-02-01 | End: 2022-04-26 | Stop reason: SDUPTHER

## 2022-03-28 DIAGNOSIS — E78.2 MIXED HYPERLIPIDEMIA: ICD-10-CM

## 2022-03-29 RX ORDER — LOSARTAN POTASSIUM AND HYDROCHLOROTHIAZIDE 25; 100 MG/1; MG/1
TABLET ORAL
Qty: 90 TABLET | Refills: 1 | Status: SHIPPED | OUTPATIENT
Start: 2022-03-29

## 2022-04-05 ENCOUNTER — OFFICE VISIT (OUTPATIENT)
Dept: FAMILY MEDICINE CLINIC | Facility: CLINIC | Age: 37
End: 2022-04-05
Payer: COMMERCIAL

## 2022-04-05 VITALS
HEIGHT: 60 IN | RESPIRATION RATE: 16 BRPM | TEMPERATURE: 98.6 F | DIASTOLIC BLOOD PRESSURE: 76 MMHG | HEART RATE: 86 BPM | SYSTOLIC BLOOD PRESSURE: 130 MMHG | WEIGHT: 144.4 LBS | OXYGEN SATURATION: 100 % | BODY MASS INDEX: 28.35 KG/M2

## 2022-04-05 DIAGNOSIS — E78.5 HYPERLIPIDEMIA, UNSPECIFIED HYPERLIPIDEMIA TYPE: ICD-10-CM

## 2022-04-05 DIAGNOSIS — Z00.00 ANNUAL PHYSICAL EXAM: Primary | ICD-10-CM

## 2022-04-05 DIAGNOSIS — R30.9 PAIN WITH URINATION: ICD-10-CM

## 2022-04-05 DIAGNOSIS — G89.29 CHRONIC BILATERAL LOW BACK PAIN WITHOUT SCIATICA: ICD-10-CM

## 2022-04-05 DIAGNOSIS — M54.50 CHRONIC BILATERAL LOW BACK PAIN WITHOUT SCIATICA: ICD-10-CM

## 2022-04-05 LAB
SL AMB  POCT GLUCOSE, UA: NORMAL
SL AMB LEUKOCYTE ESTERASE,UA: NORMAL
SL AMB POCT BILIRUBIN,UA: NORMAL
SL AMB POCT BLOOD,UA: NORMAL
SL AMB POCT CLARITY,UA: CLEAR
SL AMB POCT COLOR,UA: YELLOW
SL AMB POCT KETONES,UA: NORMAL
SL AMB POCT NITRITE,UA: NORMAL
SL AMB POCT PH,UA: 5
SL AMB POCT SPECIFIC GRAVITY,UA: 1
SL AMB POCT URINE PROTEIN: NORMAL
SL AMB POCT UROBILINOGEN: NORMAL

## 2022-04-05 PROCEDURE — 99395 PREV VISIT EST AGE 18-39: CPT | Performed by: NURSE PRACTITIONER

## 2022-04-05 PROCEDURE — 3725F SCREEN DEPRESSION PERFORMED: CPT | Performed by: NURSE PRACTITIONER

## 2022-04-05 PROCEDURE — 81002 URINALYSIS NONAUTO W/O SCOPE: CPT | Performed by: NURSE PRACTITIONER

## 2022-04-05 PROCEDURE — 1036F TOBACCO NON-USER: CPT | Performed by: NURSE PRACTITIONER

## 2022-04-05 PROCEDURE — 3008F BODY MASS INDEX DOCD: CPT | Performed by: NURSE PRACTITIONER

## 2022-04-05 NOTE — PROGRESS NOTES
[address] PRACTICE    NAME: Ian Roberts  AGE: 39 y o  SEX: female  : 1985     DATE: 2022     Assessment and Plan:     Problem List Items Addressed This Visit     None      Visit Diagnoses     Annual physical exam    -  Primary    Pain with urination        Relevant Orders    POCT urine dip    Urine culture          Immunizations and preventive care screenings were discussed with patient today  Appropriate education was printed on patient's after visit summary  Counseling:  Alcohol/drug use: discussed moderation in alcohol intake, the recommendations for healthy alcohol use, and avoidance of illicit drug use  Dental Health: discussed importance of regular tooth brushing, flossing, and dental visits  Injury prevention: discussed safety/seat belts, safety helmets, smoke detectors, carbon dioxide detectors, and smoking near bedding or upholstery  Sexual health: discussed sexually transmitted diseases, partner selection, use of condoms, avoidance of unintended pregnancy, and contraceptive alternatives  · Exercise: the importance of regular exercise/physical activity was discussed  Recommend exercise 3-5 times per week for at least 30 minutes  BMI Counseling: Body mass index is 28 2 kg/m²  The BMI is above normal  Nutrition recommendations include decreasing portion sizes, encouraging healthy choices of fruits and vegetables, decreasing fast food intake, consuming healthier snacks, limiting drinks that contain sugar, moderation in carbohydrate intake, increasing intake of lean protein, reducing intake of saturated and trans fat and reducing intake of cholesterol  Exercise recommendations include moderate physical activity 150 minutes/week and strength training exercises  Rationale for BMI follow-up plan is due to patient being overweight or obese       Depression Screening and Follow-up Plan: Patient was screened for depression during today's encounter  They screened negative with a PHQ-2 score of 0  Return in 1 year (on 2023)  Chief Complaint:     Chief Complaint   Patient presents with    Annual Exam    Back Pain     lower back pain    Pain with urination      History of Present Illness:     Adult Annual Physical   Patient here for a comprehensive physical exam  The patient reports problems - back pain and dysuria   Diet and Physical Activity  · Diet/Nutrition: well balanced diet and consuming 3-5 servings of fruits/vegetables daily  · Exercise: walking and 5-7 times a week on average  Depression Screening  PHQ-2/9 Depression Screening    Little interest or pleasure in doing things: 0 - not at all  Feeling down, depressed, or hopeless: 0 - not at all  PHQ-2 Score: 0  PHQ-2 Interpretation: Negative depression screen       General Health  · Sleep: sleeps well and gets 7-8 hours of sleep on average  · Hearing: normal - bilateral   · Vision: no vision problems, most recent eye exam <1 year ago and wears glasses  · Dental: regular dental visits, brushes teeth twice daily and does not brush teeth regularly  /GYN Health  · Last menstrual period: last month  · Contraceptive method:    · History of STDs?: no      Review of Systems:     Review of Systems   Constitutional: Negative  HENT: Negative  Eyes: Negative  Respiratory: Negative  Cardiovascular: Negative  Gastrointestinal: Negative  Endocrine: Negative  Genitourinary: Negative  Musculoskeletal: Positive for back pain  Negative for arthralgias  Skin: Negative  Allergic/Immunologic: Negative  Neurological: Negative  Hematological: Negative  Psychiatric/Behavioral: Negative         Past Medical History:     Past Medical History:   Diagnosis Date    Hypertension       Past Surgical History:     Past Surgical History:   Procedure Laterality Date     SECTION        Social History:     Social History Socioeconomic History    Marital status: /Civil Union     Spouse name: None    Number of children: None    Years of education: None    Highest education level: None   Occupational History    None   Tobacco Use    Smoking status: Former Smoker    Smokeless tobacco: Never Used   Vaping Use    Vaping Use: Never used   Substance and Sexual Activity    Alcohol use: Not Currently     Comment: Social     Drug use: Never    Sexual activity: Yes     Partners: Male     Birth control/protection: None   Other Topics Concern    None   Social History Narrative    Nonsmoker - as per [facility]      Social Determinants of Health     Financial Resource Strain: Not on file   Food Insecurity: Not on file   Transportation Needs: Not on file   Physical Activity: Inactive    Days of Exercise per Week: 0 days   [employer] of Exercise per Session: 0 min   Stress: No Stress Concern Present    Feeling of Stress :  Only a little   Social Connections: Unknown    Frequency of Communication with Friends and Family: Not on file    Frequency of Social Gatherings with Friends and Family: Not on file    Attends Orthodoxy Services: Not on file    Active Member of Clubs or Organizations: Not on file    Attends Club or Organization Meetings: Not on file    Marital Status:    Intimate Partner Violence: Not At Risk    Fear of Current or Ex-Partner: No    Emotionally Abused: No    Physically Abused: No    Sexually Abused: No   Housing Stability: Not on file      Family History:     Family History   Problem Relation Age of Onset    Hypertension Mother     Diabetes Mother     Hypertension Father     Diabetes Father     Colon polyps Neg Hx     Colon cancer Neg Hx       Current Medications:     Current Outpatient Medications   Medication Sig Dispense Refill    losartan-hydrochlorothiazide (HYZAAR) 100-25 MG per tablet take 1 tablet by mouth once daily 90 tablet 1    rosuvastatin (CRESTOR) 10 MG tablet Take 1 tablet (10 mg total) by mouth daily 30 tablet 1     No current facility-administered medications for this visit  Allergies: Allergies   Allergen Reactions    Amoxicillin Rash      Physical Exam:     /76 (BP Location: Left arm, Patient Position: Sitting, Cuff Size: Standard)   Pulse 86   Temp 98 6 °F (37 °C) (Tympanic)   Resp 16   Ht 5' (1 524 m)   Wt 65 5 kg (144 lb 6 4 oz)   LMP 03/10/2022 (Approximate)   SpO2 100%   Breastfeeding No   BMI 28 20 kg/m²     Physical Exam  Vitals and nursing note reviewed  Constitutional:       Appearance: Normal appearance  She is not ill-appearing  HENT:      Head: Normocephalic and atraumatic  Right Ear: Tympanic membrane, ear canal and external ear normal  There is no impacted cerumen  Left Ear: Tympanic membrane, ear canal and external ear normal  There is no impacted cerumen  Nose: Nose normal  No congestion  Mouth/Throat:      Mouth: Mucous membranes are moist       Pharynx: Oropharynx is clear  No oropharyngeal exudate or posterior oropharyngeal erythema  Eyes:      General:         Right eye: No discharge  Left eye: No discharge  Extraocular Movements: Extraocular movements intact  Conjunctiva/sclera: Conjunctivae normal       Pupils: Pupils are equal, round, and reactive to light  Cardiovascular:      Rate and Rhythm: Normal rate and regular rhythm  Pulses: Normal pulses  Heart sounds: Normal heart sounds  No murmur heard  Pulmonary:      Effort: Pulmonary effort is normal  No respiratory distress  Breath sounds: Normal breath sounds  No rhonchi  Abdominal:      General: Abdomen is flat  Bowel sounds are normal  There is no distension  Palpations: Abdomen is soft  Tenderness: There is no abdominal tenderness  There is no right CVA tenderness or left CVA tenderness  Hernia: No hernia is present  Musculoskeletal:         General: No swelling  Normal range of motion  Cervical back: Normal range of motion and neck supple  No rigidity or tenderness  Lumbar back: Tenderness present  No swelling, edema or signs of trauma  Normal range of motion  No scoliosis  Back:       Right lower leg: No edema  Left lower leg: No edema  Lymphadenopathy:      Cervical: No cervical adenopathy  Skin:     General: Skin is warm and dry  Capillary Refill: Capillary refill takes less than 2 seconds  Neurological:      Mental Status: She is alert and oriented to person, place, and time  Psychiatric:         Mood and Affect: Mood normal          Behavior: Behavior normal          Thought Content:  Thought content normal          Judgment: Judgment normal           Eber Akins, [address]

## 2022-04-05 NOTE — PATIENT INSTRUCTIONS
Alesia Paige  Referral to Physical therapy   Wellness Visit for Adults   AMBULATORY CARE:   A wellness visit  is when you see your healthcare provider to get screened for health problems  Your healthcare provider will also give you advice on how to stay healthy  Write down your questions so you remember to ask them  Ask your healthcare provider how often you should have a wellness visit  What happens at a wellness visit:  Your healthcare provider will ask about your health, and your family history of health problems  This includes high blood pressure, heart disease, and cancer  He or she will ask if you have symptoms that concern you, if you smoke, and about your mood  You may also be asked about your intake of medicines, supplements, food, and alcohol  Any of the following may be done:  · Your weight  will be checked  Your height may also be checked so your body mass index (BMI) can be calculated  Your BMI shows if you are at a healthy weight  · Your blood pressure  and heart rate will be checked  Your temperature may also be checked  · Blood and urine tests  may be done  Blood tests may be done to check your cholesterol levels  Abnormal cholesterol levels increase your risk for heart disease and stroke  You may also need a blood or urine test to check for diabetes if you are at increased risk  Urine tests may be done to look for signs of an infection or kidney disease  · A physical exam  includes checking your heartbeat and lungs with a stethoscope  Your healthcare provider may also check your skin to look for sun damage  · Screening tests  may be recommended  A screening test is done to check for diseases that may not cause symptoms  The screening tests you may need depend on your age, gender, family history, and lifestyle habits  For example, colorectal screening may be recommended if you are 48years old or older      Screening tests you need if you are a woman:   · A Pap smear  is used to screen for cervical cancer  Pap smears are usually done every 3 to 5 years depending on your age  You may need them more often if you have had abnormal Pap smear test results in the past  Ask your healthcare provider how often you should have a Pap smear  · A mammogram  is an x-ray of your breasts to screen for breast cancer  Experts recommend mammograms every 2 years starting at age 48 years  You may need a mammogram at age 52 years or younger if you have an increased risk for breast cancer  Talk to your healthcare provider about when you should start having mammograms and how often you need them  Vaccines you may need:   · Get an influenza vaccine  every year  The influenza vaccine protects you from the flu  Several types of viruses cause the flu  The viruses change over time, so new vaccines are made each year  · Get a tetanus-diphtheria (Td) booster vaccine  every 10 years  This vaccine protects you against tetanus and diphtheria  Tetanus is a severe infection that may cause painful muscle spasms and lockjaw  Diphtheria is a severe bacterial infection that causes a thick covering in the back of your mouth and throat  · Get a human papillomavirus (HPV) vaccine  if you are female and aged 23 to 32 or male 23 to 24 and never received it  This vaccine protects you from HPV infection  HPV is the most common infection spread by sexual contact  HPV may also cause vaginal, penile, and anal cancers  · Get a pneumococcal vaccine  if you are aged 72 years or older  The pneumococcal vaccine is an injection given to protect you from pneumococcal disease  Pneumococcal disease is an infection caused by pneumococcal bacteria  The infection may cause pneumonia, meningitis, or an ear infection  · Get a shingles vaccine  if you are 60 or older, even if you have had shingles before  The shingles vaccine is an injection to protect you from the varicella-zoster virus  This is the same virus that causes chickenpox  Shingles is a painful rash that develops in people who had chickenpox or have been exposed to the virus  How to eat healthy:  My Plate is a model for planning healthy meals  It shows the types and amounts of foods that should go on your plate  Fruits and vegetables make up about half of your plate, and grains and protein make up the other half  A serving of dairy is included on the side of your plate  The amount of calories and serving sizes you need depends on your age, gender, weight, and height  Examples of healthy foods are listed below:  · Eat a variety of vegetables  such as dark green, red, and orange vegetables  You can also include canned vegetables low in sodium (salt) and frozen vegetables without added butter or sauces  · Eat a variety of fresh fruits , canned fruit in 100% juice, frozen fruit, and dried fruit  · Include whole grains  At least half of the grains you eat should be whole grains  Examples include whole-wheat bread, wheat pasta, brown rice, and whole-grain cereals such as oatmeal     · Eat a variety of protein foods such as seafood (fish and shellfish), lean meat, and poultry without skin (turkey and chicken)  Examples of lean meats include pork leg, shoulder, or tenderloin, and beef round, sirloin, tenderloin, and extra lean ground beef  Other protein foods include eggs and egg substitutes, beans, peas, soy products, nuts, and seeds  · Choose low-fat dairy products such as skim or 1% milk or low-fat yogurt, cheese, and cottage cheese  · Limit unhealthy fats  such as butter, hard margarine, and shortening  Exercise:  Exercise at least 30 minutes per day on most days of the week  Some examples of exercise include walking, biking, dancing, and swimming  You can also fit in more physical activity by taking the stairs instead of the elevator or parking farther away from stores  Include muscle strengthening activities 2 days each week   Regular exercise provides many health benefits  It helps you manage your weight, and decreases your risk for type 2 diabetes, heart disease, stroke, and high blood pressure  Exercise can also help improve your mood  Ask your healthcare provider about the best exercise plan for you  General health and safety guidelines:   · Do not smoke  Nicotine and other chemicals in cigarettes and cigars can cause lung damage  Ask your healthcare provider for information if you currently smoke and need help to quit  E-cigarettes or smokeless tobacco still contain nicotine  Talk to your healthcare provider before you use these products  · Limit alcohol  A drink of alcohol is 12 ounces of beer, 5 ounces of wine, or 1½ ounces of liquor  · Lose weight, if needed  Being overweight increases your risk of certain health conditions  These include heart disease, high blood pressure, type 2 diabetes, and certain types of cancer  · Protect your skin  Do not sunbathe or use tanning beds  Use sunscreen with a SPF 15 or higher  Apply sunscreen at least 15 minutes before you go outside  Reapply sunscreen every 2 hours  Wear protective clothing, hats, and sunglasses when you are outside  · Drive safely  Always wear your seatbelt  Make sure everyone in your car wears a seatbelt  A seatbelt can save your life if you are in an accident  Do not use your cell phone when you are driving  This could distract you and cause an accident  Pull over if you need to make a call or send a text message  · Practice safe sex  Use latex condoms if are sexually active and have more than one partner  Your healthcare provider may recommend screening tests for sexually transmitted infections (STIs)  · Wear helmets, lifejackets, and protective gear  Always wear a helmet when you ride a bike or motorcycle, go skiing, or play sports that could cause a head injury  Wear protective equipment when you play sports  Wear a lifejacket when you are on a boat or doing water sports      © Copyright [employer] 2022 Information is for End User's use only and may not be sold, redistributed or otherwise used for commercial purposes  All illustrations and images included in CareNotes® are the copyrighted property of [employer]  or [employer]an [employer]  The above information is an  only  It is not intended as medical advice for individual conditions or treatments  Talk to your doctor, nurse or pharmacist before following any medical regimen to see if it is safe and effective for you

## 2022-04-09 LAB
ALBUMIN SERPL-MCNC: 4.8 G/DL (ref 3.8–4.8)
ALBUMIN/GLOB SERPL: 1.7 {RATIO} (ref 1.2–2.2)
ALP SERPL-CCNC: 42 IU/L (ref 44–121)
ALT SERPL-CCNC: 12 IU/L (ref 0–32)
AST SERPL-CCNC: 14 IU/L (ref 0–40)
BASOPHILS # BLD AUTO: 0 X10E3/UL (ref 0–0.2)
BASOPHILS NFR BLD AUTO: 0 %
BILIRUB SERPL-MCNC: 0.4 MG/DL (ref 0–1.2)
BUN SERPL-MCNC: 17 MG/DL (ref 6–20)
BUN/CREAT SERPL: 22 (ref 9–23)
CALCIUM SERPL-MCNC: 9.3 MG/DL (ref 8.7–10.2)
CHLORIDE SERPL-SCNC: 100 MMOL/L (ref 96–106)
CHOLEST SERPL-MCNC: 160 MG/DL (ref 100–199)
CO2 SERPL-SCNC: 20 MMOL/L (ref 20–29)
CREAT SERPL-MCNC: 0.78 MG/DL (ref 0.57–1)
EGFR: 101 ML/MIN/1.73
EOSINOPHIL # BLD AUTO: 0.1 X10E3/UL (ref 0–0.4)
EOSINOPHIL NFR BLD AUTO: 1 %
ERYTHROCYTE [DISTWIDTH] IN BLOOD BY AUTOMATED COUNT: 12 % (ref 11.7–15.4)
GLOBULIN SER-MCNC: 2.8 G/DL (ref 1.5–4.5)
GLUCOSE SERPL-MCNC: 101 MG/DL (ref 65–99)
HCT VFR BLD AUTO: 38.2 % (ref 34–46.6)
HDLC SERPL-MCNC: 50 MG/DL
HGB BLD-MCNC: 13.3 G/DL (ref 11.1–15.9)
IMM GRANULOCYTES # BLD: 0 X10E3/UL (ref 0–0.1)
IMM GRANULOCYTES NFR BLD: 0 %
LDLC SERPL CALC-MCNC: 91 MG/DL (ref 0–99)
LYMPHOCYTES # BLD AUTO: 3.7 X10E3/UL (ref 0.7–3.1)
LYMPHOCYTES NFR BLD AUTO: 45 %
MCH RBC QN AUTO: 30.4 PG (ref 26.6–33)
MCHC RBC AUTO-ENTMCNC: 34.8 G/DL (ref 31.5–35.7)
MCV RBC AUTO: 87 FL (ref 79–97)
MONOCYTES # BLD AUTO: 0.5 X10E3/UL (ref 0.1–0.9)
MONOCYTES NFR BLD AUTO: 6 %
NEUTROPHILS # BLD AUTO: 3.9 X10E3/UL (ref 1.4–7)
NEUTROPHILS NFR BLD AUTO: 48 %
PLATELET # BLD AUTO: 354 X10E3/UL (ref 150–450)
POTASSIUM SERPL-SCNC: 4.1 MMOL/L (ref 3.5–5.2)
PROT SERPL-MCNC: 7.6 G/DL (ref 6–8.5)
RBC # BLD AUTO: 4.37 X10E6/UL (ref 3.77–5.28)
SL AMB VLDL CHOLESTEROL CALC: 19 MG/DL (ref 5–40)
SODIUM SERPL-SCNC: 139 MMOL/L (ref 134–144)
TRIGL SERPL-MCNC: 101 MG/DL (ref 0–149)
WBC # BLD AUTO: 8.1 X10E3/UL (ref 3.4–10.8)

## 2022-04-26 DIAGNOSIS — E78.41 ELEVATED LIPOPROTEIN(A): ICD-10-CM

## 2022-04-26 RX ORDER — ROSUVASTATIN CALCIUM 10 MG/1
10 TABLET, COATED ORAL DAILY
Qty: 90 TABLET | Refills: 0 | Status: SHIPPED | OUTPATIENT
Start: 2022-04-26

## 2022-04-26 RX ORDER — ROSUVASTATIN CALCIUM 10 MG/1
10 TABLET, COATED ORAL DAILY
Qty: 90 TABLET | Refills: 0 | Status: SHIPPED | OUTPATIENT
Start: 2022-04-26 | End: 2022-04-26 | Stop reason: SDUPTHER

## 2022-04-27 ENCOUNTER — EVALUATION (OUTPATIENT)
Dept: PHYSICAL THERAPY | Facility: CLINIC | Age: 37
End: 2022-04-27
Payer: COMMERCIAL

## 2022-04-27 DIAGNOSIS — G89.29 CHRONIC BILATERAL LOW BACK PAIN WITHOUT SCIATICA: Primary | ICD-10-CM

## 2022-04-27 DIAGNOSIS — M54.50 CHRONIC BILATERAL LOW BACK PAIN WITHOUT SCIATICA: Primary | ICD-10-CM

## 2022-04-27 PROCEDURE — 97110 THERAPEUTIC EXERCISES: CPT | Performed by: PHYSICAL THERAPIST

## 2022-04-27 PROCEDURE — 97161 PT EVAL LOW COMPLEX 20 MIN: CPT | Performed by: PHYSICAL THERAPIST

## 2022-05-04 ENCOUNTER — OFFICE VISIT (OUTPATIENT)
Dept: PHYSICAL THERAPY | Facility: CLINIC | Age: 37
End: 2022-05-04
Payer: COMMERCIAL

## 2022-05-04 DIAGNOSIS — M54.50 CHRONIC BILATERAL LOW BACK PAIN WITHOUT SCIATICA: Primary | ICD-10-CM

## 2022-05-04 DIAGNOSIS — G89.29 CHRONIC BILATERAL LOW BACK PAIN WITHOUT SCIATICA: Primary | ICD-10-CM

## 2022-05-04 PROCEDURE — 97112 NEUROMUSCULAR REEDUCATION: CPT | Performed by: PHYSICAL THERAPIST

## 2022-05-04 PROCEDURE — 97110 THERAPEUTIC EXERCISES: CPT | Performed by: PHYSICAL THERAPIST

## 2022-05-04 PROCEDURE — 97140 MANUAL THERAPY 1/> REGIONS: CPT | Performed by: PHYSICAL THERAPIST

## 2022-05-04 NOTE — PROGRESS NOTES
Daily Note     Today's date: 2022  Patient name: Ian Roberts  : 1985  MRN: [id number]  Referring provider: JOSE Lebron  Dx:   Encounter Diagnosis     ICD-10-CM    1  Chronic bilateral low back pain without sciatica  M54 50     G89 29                   Subjective: Iam Perkins explains that she feels some discomfort while performing the stretches but overall her pain has greatly reduced since starting PT  Objective: See treatment diary below      Assessment: Tolerated treatment well  Patient demonstrated fatigue post treatment and would benefit from continued PT  During first few repetitions of new TE she reports "pain" in left hip, with continued repetitions she reports relief  Updated HEP to reflect changes made this session  Plan: Continue per plan of care        Precautions: HTN      Manuals            STM L PSM nv RN                                                  Neuro Re-Ed             PPT 10x10'' 10x10''           PPT march nv            BKFO nv x10 ea           Palloff press                                                    Ther Ex             Bridges 2x10 2x10           SKTC 10x10'' ea 10x10''           Supine piri s' 10x10'' ea 10x10''           SL clamshells 2x10 GTB 2x10 GTB           Seated lumbar flexion s'  10x10''           Seated piri s'  5x15''                                     Ther Activity             Box lifting                          Gait Training                                       Modalities

## 2022-05-11 ENCOUNTER — OFFICE VISIT (OUTPATIENT)
Dept: PHYSICAL THERAPY | Facility: CLINIC | Age: 37
End: 2022-05-11
Payer: COMMERCIAL

## 2022-05-11 DIAGNOSIS — M54.50 CHRONIC BILATERAL LOW BACK PAIN WITHOUT SCIATICA: Primary | ICD-10-CM

## 2022-05-11 DIAGNOSIS — G89.29 CHRONIC BILATERAL LOW BACK PAIN WITHOUT SCIATICA: Primary | ICD-10-CM

## 2022-05-11 PROCEDURE — 97140 MANUAL THERAPY 1/> REGIONS: CPT | Performed by: PHYSICAL THERAPIST

## 2022-05-11 PROCEDURE — 97110 THERAPEUTIC EXERCISES: CPT | Performed by: PHYSICAL THERAPIST

## 2022-05-11 PROCEDURE — 97112 NEUROMUSCULAR REEDUCATION: CPT | Performed by: PHYSICAL THERAPIST

## 2022-05-11 NOTE — PROGRESS NOTES
Daily Note     Today's date: 2022  Patient name: Audelia Mason  : 1985  MRN: [id number]  Referring provider: JOSE Marley  Dx:   Encounter Diagnosis     ICD-10-CM    1  Chronic bilateral low back pain without sciatica  M54 50     G89 29        Start Time: 1030  Stop Time: 1115  Total time in clinic (min): 45 minutes    Subjective: Valentino Sayers explains that her back has been feeling a little better this morning  She notices she has been doing more recently which is a good sign  Objective: See treatment diary below      Assessment: Tolerated treatment well  Patient demonstrated fatigue post treatment and exhibited good technique with therapeutic exercises  Cueing to correct pelvic tilts and added in supine marches with pelvic tilts  Also added in yvonne post stretch with appropriate stretch and noted relief post  Updated HEP to reflect changes made this session  Plan to discuss plan moving forward next week, given her sx improvement and independence in HEP  Potential pause or DC next week  Plan: Continue per plan of care        Precautions: HTN      Manuals           STM L PSM nv RN RN                                                 Neuro Re-Ed             PPT 10x10'' 10x10'' 10x10''          PPT march nv  x10 ea          BKFO nv x10 ea x10 ea          Palloff press   nv                                                 Ther Ex             Bridges 2x10 2x10 2x10          SKTC 10x10'' ea 10x10'' 10x10''          Supine piri s' 10x10'' ea 10x10'' 10x10''          SL clamshells 2x10 GTB 2x10 GTB 2x10 GTB          Seated lumbar flexion s'  10x10'' 10x10''          Seated Ezra Meeter s'  5x15'' 5x15'          Yvonne post with left side bend   10x10'' ea                       Ther Activity             Box lifting                          Gait Training                                       Modalities

## 2022-05-18 ENCOUNTER — OFFICE VISIT (OUTPATIENT)
Dept: PHYSICAL THERAPY | Facility: CLINIC | Age: 37
End: 2022-05-18
Payer: COMMERCIAL

## 2022-05-18 DIAGNOSIS — M54.50 CHRONIC BILATERAL LOW BACK PAIN WITHOUT SCIATICA: Primary | ICD-10-CM

## 2022-05-18 DIAGNOSIS — G89.29 CHRONIC BILATERAL LOW BACK PAIN WITHOUT SCIATICA: Primary | ICD-10-CM

## 2022-05-18 PROCEDURE — 97140 MANUAL THERAPY 1/> REGIONS: CPT | Performed by: PHYSICAL THERAPIST

## 2022-05-18 PROCEDURE — 97112 NEUROMUSCULAR REEDUCATION: CPT | Performed by: PHYSICAL THERAPIST

## 2022-05-18 PROCEDURE — 97110 THERAPEUTIC EXERCISES: CPT | Performed by: PHYSICAL THERAPIST

## 2022-05-18 NOTE — PROGRESS NOTES
Daily Note     Today's date: 2022  Patient name: Jt Whiteside  : 1985  MRN: [id number]  Referring provider: JOSE Cuba  Dx:   Encounter Diagnosis     ICD-10-CM    1  Chronic bilateral low back pain without sciatica  M54 50     G89 29        Start Time: 1030  Stop Time: 1115  Total time in clinic (min): 45 minutes    Subjective: Vince Notice explains that she has been feeling much improved and is doing things back to normal as compared to her normal level of activity  Objective: See treatment diary below      Assessment: Tolerated treatment well  Patient demonstrated fatigue post treatment and exhibited good technique with therapeutic exercises  Pt has made improvements in strength, range of motion, pain control and towards a return to maximal level of function  Pt reports they have improved to 95% since beginning Physical Therapy  Pt educated on importance of continuing HEP, progressions of exercises and to contact the facility if there are any questions or concerns in the future  Pt will be discharged from PT at this time      Plan: DC from PT     Precautions: HTN      Manuals          STM L PSM nv RN RN                                                 Neuro Re-Ed             PPT 10x10'' 10x10'' 10x10'' 10x10''         PPT march nv  x10 ea x10 ea         BKFO nv x10 ea x10 ea x10 ea         Palloff press   nv                                                 Ther Ex             Bridges 2x10 2x10 2x10 2x10         SKTC 10x10'' ea 10x10'' 10x10'' 10x10''         Supine piri s' 10x10'' ea 10x10'' 10x10'' 10x10''         SL clamshells 2x10 GTB 2x10 GTB 2x10 GTB 2x10 GTB         Seated lumbar flexion s'  10x10'' 10x10'' 10x10''         Seated piri s'  5x15'' 5x15' 5x15''         Gianfranco post with left side bend   10x10'' ea 10x10'' ea                      Ther Activity             Box lifting                            Gait Training                                       Modalities

## 2022-08-15 ENCOUNTER — ANNUAL EXAM (OUTPATIENT)
Dept: OBGYN CLINIC | Facility: CLINIC | Age: 37
End: 2022-08-15
Payer: COMMERCIAL

## 2022-08-15 VITALS
DIASTOLIC BLOOD PRESSURE: 84 MMHG | HEIGHT: 61 IN | SYSTOLIC BLOOD PRESSURE: 122 MMHG | WEIGHT: 149.2 LBS | BODY MASS INDEX: 28.17 KG/M2

## 2022-08-15 DIAGNOSIS — Z12.4 CERVICAL CANCER SCREENING: Primary | ICD-10-CM

## 2022-08-15 DIAGNOSIS — Z11.51 SCREENING FOR HPV (HUMAN PAPILLOMAVIRUS): ICD-10-CM

## 2022-08-15 DIAGNOSIS — R53.83 OTHER FATIGUE: ICD-10-CM

## 2022-08-15 DIAGNOSIS — Z01.419 ENCOUNTER FOR ANNUAL ROUTINE GYNECOLOGICAL EXAMINATION: ICD-10-CM

## 2022-08-15 DIAGNOSIS — D21.9 FIBROID: ICD-10-CM

## 2022-08-15 PROCEDURE — G0476 HPV COMBO ASSAY CA SCREEN: HCPCS | Performed by: OBSTETRICS & GYNECOLOGY

## 2022-08-15 PROCEDURE — S0612 ANNUAL GYNECOLOGICAL EXAMINA: HCPCS | Performed by: OBSTETRICS & GYNECOLOGY

## 2022-08-15 PROCEDURE — G0145 SCR C/V CYTO,THINLAYER,RESCR: HCPCS | Performed by: OBSTETRICS & GYNECOLOGY

## 2022-08-15 NOTE — PROGRESS NOTES
Assessment/Plan:    Pap and HPV done today    Discussed self breast exams    Fatigue during menstrual cycle-TSH ordered, CBC was normal     History of fibroids, possible lipoleiomyoma noted last year on ultrasound  Repeat ultrasound ordered to ensure stability  Essure not mentioned on radiology report  Will await results    discussed preventive care, regular exercise and a healthy diet      No problem-specific Assessment & Plan notes found for this encounter  Diagnoses and all orders for this visit:    Encounter for annual routine gynecological examination    Other fatigue  -     TSH, 3rd generation with Free T4 reflex; Future    Fibroid  -     US pelvis complete w transvaginal; Future    Cervical cancer screening    Screening for HPV (human papillomavirus)          Subjective:      Patient ID: Amor Ho is a 39 y o  female  Patient here for yearly  Menstrual cycles are regular  Lately, she feels more tired during cycle  She had a normal CBC in April  Normal pap in 2018  S/p Essure in 2014      The following portions of the patient's history were reviewed and updated as appropriate: allergies, current medications, past family history, past medical history, past social history, past surgical history and problem list     Review of Systems   Constitutional: Positive for fatigue  Gastrointestinal: Negative  Genitourinary: Negative  Objective: There were no vitals taken for this visit  Physical Exam  Vitals reviewed  Constitutional:       Appearance: She is well-developed  Neck:      Thyroid: No thyromegaly  Trachea: No tracheal deviation  Cardiovascular:      Rate and Rhythm: Normal rate and regular rhythm  Pulmonary:      Effort: Pulmonary effort is normal       Breath sounds: Normal breath sounds  Chest:   Breasts: Breasts are symmetrical       Right: No inverted nipple, mass, nipple discharge, skin change or tenderness        Left: No inverted nipple, mass, nipple discharge, skin change or tenderness  Abdominal:      General: There is no distension  Palpations: Abdomen is soft  There is no mass  Tenderness: There is no abdominal tenderness  Genitourinary:     Labia:         Right: No rash, tenderness, lesion or injury  Left: No rash, tenderness, lesion or injury  Vagina: Normal       Cervix: No cervical motion tenderness, discharge or friability  Adnexa:         Right: No mass, tenderness or fullness  Left: No mass, tenderness or fullness

## 2022-08-18 LAB
HPV HR 12 DNA CVX QL NAA+PROBE: NEGATIVE
HPV16 DNA CVX QL NAA+PROBE: NEGATIVE
HPV18 DNA CVX QL NAA+PROBE: NEGATIVE
LAB AP GYN PRIMARY INTERPRETATION: NORMAL
Lab: NORMAL
PATH INTERP SPEC-IMP: NORMAL

## 2022-08-27 ENCOUNTER — APPOINTMENT (OUTPATIENT)
Dept: LAB | Facility: CLINIC | Age: 37
End: 2022-08-27
Payer: COMMERCIAL

## 2022-08-27 DIAGNOSIS — R53.83 OTHER FATIGUE: ICD-10-CM

## 2022-08-27 LAB — TSH SERPL DL<=0.05 MIU/L-ACNC: 0.79 UIU/ML (ref 0.45–4.5)

## 2022-08-27 PROCEDURE — 84443 ASSAY THYROID STIM HORMONE: CPT

## 2022-08-27 PROCEDURE — 36415 COLL VENOUS BLD VENIPUNCTURE: CPT

## 2022-10-05 ENCOUNTER — OFFICE VISIT (OUTPATIENT)
Dept: FAMILY MEDICINE CLINIC | Facility: CLINIC | Age: 37
End: 2022-10-05
Payer: COMMERCIAL

## 2022-10-05 VITALS
TEMPERATURE: 98 F | SYSTOLIC BLOOD PRESSURE: 138 MMHG | RESPIRATION RATE: 16 BRPM | DIASTOLIC BLOOD PRESSURE: 84 MMHG | BODY MASS INDEX: 27.75 KG/M2 | HEIGHT: 61 IN | OXYGEN SATURATION: 99 % | HEART RATE: 81 BPM | WEIGHT: 147 LBS

## 2022-10-05 DIAGNOSIS — E78.2 MIXED HYPERLIPIDEMIA: ICD-10-CM

## 2022-10-05 DIAGNOSIS — E78.41 ELEVATED LIPOPROTEIN(A): ICD-10-CM

## 2022-10-05 PROCEDURE — 99213 OFFICE O/P EST LOW 20 MIN: CPT | Performed by: NURSE PRACTITIONER

## 2022-10-05 RX ORDER — ROSUVASTATIN CALCIUM 10 MG/1
10 TABLET, COATED ORAL DAILY
Qty: 90 TABLET | Refills: 0 | Status: SHIPPED | OUTPATIENT
Start: 2022-10-05

## 2022-10-05 RX ORDER — LOSARTAN POTASSIUM AND HYDROCHLOROTHIAZIDE 25; 100 MG/1; MG/1
1 TABLET ORAL DAILY
Qty: 90 TABLET | Refills: 1 | Status: SHIPPED | OUTPATIENT
Start: 2022-10-05

## 2022-10-05 NOTE — PROGRESS NOTES
Assessment/Plan   Problem List Items Addressed This Visit    None     Visit Diagnoses     Mixed hyperlipidemia        Relevant Medications    losartan-hydrochlorothiazide (HYZAAR) 100-25 MG per tablet    rosuvastatin (CRESTOR) 10 MG tablet    Elevated lipoprotein(a)        Relevant Medications    rosuvastatin (CRESTOR) 10 MG tablet                Chief Complaint   Patient presents with    6 month follow-up     Patient reports on-and-off itchiness in the right ear over the past year  Subjective   Patient ID: Toña Baer is a 39 y o  female  Vitals:    10/05/22 1002   BP: 138/84   Pulse: 81   Resp: 16   Temp: 98 °F (36 7 °C)   SpO2: 99%     Stable on all medications   Follow up in 5 months     Hypertension  This is a chronic problem  The current episode started more than 1 year ago  The problem has been resolved since onset  The problem is controlled  Pertinent negatives include no anxiety, orthopnea or shortness of breath  There are no associated agents to hypertension  Risk factors for coronary artery disease include dyslipidemia, sedentary lifestyle and stress  Past treatments include angiotensin blockers and diuretics  The current treatment provides significant improvement  Compliance problems include diet and exercise  Hyperlipidemia  This is a chronic problem  The current episode started more than 1 year ago  The problem is controlled  Recent lipid tests were reviewed and are normal  There are no known factors aggravating her hyperlipidemia  Pertinent negatives include no shortness of breath  Current antihyperlipidemic treatment includes statins  The current treatment provides significant improvement of lipids  Compliance problems include adherence to diet  Risk factors for coronary artery disease include dyslipidemia and hypertension         The following portions of the patient's history were reviewed and updated as appropriate: allergies, current medications, past medical history, past social history, past surgical history and problem list     Review of Systems   Constitutional: Negative  HENT: Negative  Eyes: Negative  Respiratory: Negative  Negative for shortness of breath  Cardiovascular: Negative  Negative for orthopnea  Gastrointestinal: Negative  Endocrine: Negative  Genitourinary: Negative  Musculoskeletal: Negative  Skin: Negative  Allergic/Immunologic: Negative  Neurological: Negative  Hematological: Negative  Psychiatric/Behavioral: Negative  Objective     Physical Exam  Vitals and nursing note reviewed  Constitutional:       Appearance: She is not ill-appearing  HENT:      Head: Normocephalic and atraumatic  Right Ear: Tympanic membrane and ear canal normal       Left Ear: Tympanic membrane and ear canal normal       Nose: Nose normal  No congestion  Mouth/Throat:      Mouth: Mucous membranes are moist       Pharynx: Oropharynx is clear  No oropharyngeal exudate or posterior oropharyngeal erythema  Eyes:      General:         Right eye: No discharge  Left eye: No discharge  Extraocular Movements: Extraocular movements intact  Conjunctiva/sclera: Conjunctivae normal    Cardiovascular:      Rate and Rhythm: Normal rate and regular rhythm  Pulses: Normal pulses  Heart sounds: Normal heart sounds  No murmur heard  Pulmonary:      Effort: Pulmonary effort is normal  No respiratory distress  Breath sounds: Normal breath sounds  Abdominal:      General: Bowel sounds are normal       Palpations: Abdomen is soft  Musculoskeletal:         General: Normal range of motion  Cervical back: Normal range of motion  Skin:     General: Skin is warm and dry  Capillary Refill: Capillary refill takes less than 2 seconds  Neurological:      Mental Status: She is alert and oriented to person, place, and time     Psychiatric:         Mood and Affect: Mood normal          Behavior: Behavior normal  Thought Content:  Thought content normal          Judgment: Judgment normal

## 2023-01-12 DIAGNOSIS — E78.41 ELEVATED LIPOPROTEIN(A): ICD-10-CM

## 2023-01-12 RX ORDER — ROSUVASTATIN CALCIUM 10 MG/1
TABLET, COATED ORAL
Qty: 90 TABLET | Refills: 0 | Status: SHIPPED | OUTPATIENT
Start: 2023-01-12

## 2023-06-27 ENCOUNTER — OFFICE VISIT (OUTPATIENT)
Dept: FAMILY MEDICINE CLINIC | Facility: CLINIC | Age: 38
End: 2023-06-27
Payer: COMMERCIAL

## 2023-06-27 VITALS
DIASTOLIC BLOOD PRESSURE: 84 MMHG | RESPIRATION RATE: 16 BRPM | HEART RATE: 86 BPM | WEIGHT: 150.4 LBS | SYSTOLIC BLOOD PRESSURE: 132 MMHG | BODY MASS INDEX: 28.4 KG/M2 | HEIGHT: 61 IN | OXYGEN SATURATION: 97 % | TEMPERATURE: 98.2 F

## 2023-06-27 DIAGNOSIS — Z13.6 SCREENING FOR CARDIOVASCULAR CONDITION: ICD-10-CM

## 2023-06-27 DIAGNOSIS — E78.5 HYPERLIPIDEMIA, UNSPECIFIED HYPERLIPIDEMIA TYPE: ICD-10-CM

## 2023-06-27 DIAGNOSIS — Z13.29 SCREENING FOR THYROID DISORDER: ICD-10-CM

## 2023-06-27 DIAGNOSIS — Z00.00 ANNUAL PHYSICAL EXAM: Primary | ICD-10-CM

## 2023-06-27 DIAGNOSIS — Z13.1 SCREENING FOR DIABETES MELLITUS: ICD-10-CM

## 2023-06-27 DIAGNOSIS — E78.2 MIXED HYPERLIPIDEMIA: ICD-10-CM

## 2023-06-27 DIAGNOSIS — E78.41 ELEVATED LIPOPROTEIN(A): ICD-10-CM

## 2023-06-27 DIAGNOSIS — Z23 IMMUNIZATION DUE: ICD-10-CM

## 2023-06-27 PROCEDURE — 90715 TDAP VACCINE 7 YRS/> IM: CPT

## 2023-06-27 PROCEDURE — 90471 IMMUNIZATION ADMIN: CPT

## 2023-06-27 PROCEDURE — 99395 PREV VISIT EST AGE 18-39: CPT | Performed by: NURSE PRACTITIONER

## 2023-06-27 RX ORDER — LOSARTAN POTASSIUM AND HYDROCHLOROTHIAZIDE 25; 100 MG/1; MG/1
1 TABLET ORAL DAILY
Qty: 90 TABLET | Refills: 1 | Status: SHIPPED | OUTPATIENT
Start: 2023-06-27

## 2023-06-27 RX ORDER — ROSUVASTATIN CALCIUM 10 MG/1
10 TABLET, COATED ORAL DAILY
Qty: 90 TABLET | Refills: 0 | Status: SHIPPED | OUTPATIENT
Start: 2023-06-27

## 2023-06-27 NOTE — PROGRESS NOTES
[facility] PRACTICE    NAME: Jeanna Hoffmann  AGE: 40 y o  SEX: female  : 1985     DATE: 2023     Assessment and Plan:     Problem List Items Addressed This Visit    None  Visit Diagnoses     Annual physical exam    -  Primary    BMI 28 0-28 9,adult              Immunizations and preventive care screenings were discussed with patient today  Appropriate education was printed on patient's after visit summary  Counseling:  · Injury prevention: discussed safety/seat belts, safety helmets, smoke detectors, carbon dioxide detectors, and smoking near bedding or upholstery  BMI Counseling: Body mass index is 28 7 kg/m²  The BMI is above normal  Nutrition recommendations include encouraging healthy choices of fruits and vegetables, decreasing fast food intake, increasing intake of lean protein and reducing intake of saturated and trans fat  Exercise recommendations include moderate physical activity 150 minutes/week  Rationale for BMI follow-up plan is due to patient being overweight or obese  Depression Screening and Follow-up Plan: Patient was screened for depression during today's encounter  They screened negative with a PHQ-2 score of 0  Return in 6 months (on 2023)  Chief Complaint:     Chief Complaint   Patient presents with   • Annual Exam      History of Present Illness:     Adult Annual Physical   Patient here for a comprehensive physical exam  The patient reports no problems  Diet and Physical Activity  · Diet/Nutrition: well balanced diet and consuming 3-5 servings of fruits/vegetables daily  · Exercise: walking and 5-7 times a week on average        Depression Screening  PHQ-2/9 Depression Screening    Little interest or pleasure in doing things: 0 - not at all  Feeling down, depressed, or hopeless: 0 - not at all  PHQ-2 Score: 0  PHQ-2 Interpretation: Negative depression screen General Health  · Sleep: sleeps well and gets 7-8 hours of sleep on average  · Hearing: normal - bilateral   · Vision: no vision problems, most recent eye exam <1 year ago and wears glasses  · Dental: regular dental visits, brushes teeth twice daily and flosses teeth occasionally  /GYN Health  · Last menstrual period: 2 days ago   · Contraceptive method: none  · History of STDs?: no      Review of Systems:     Review of Systems   Constitutional: Negative  HENT: Negative  Eyes: Negative  Respiratory: Negative  Cardiovascular: Negative  Gastrointestinal: Negative  Endocrine: Negative  Genitourinary: Negative  Musculoskeletal: Negative  Skin: Negative  Allergic/Immunologic: Negative  Neurological: Negative  Hematological: Negative  Psychiatric/Behavioral: Negative         Past Medical History:     Past Medical History:   Diagnosis Date   • Hypertension       Past Surgical History:     Past Surgical History:   Procedure Laterality Date   •  SECTION        Social History:     Social History     Socioeconomic History   • Marital status: /Civil Union     Spouse name: None   • Number of children: None   • Years of education: None   • Highest education level: None   Occupational History   • None   Tobacco Use   • Smoking status: Former   • Smokeless tobacco: Never   Vaping Use   • Vaping Use: Never used   Substance and Sexual Activity   • Alcohol use: Not Currently     Comment: Social    • Drug use: Never   • Sexual activity: Yes     Partners: Male     Birth control/protection: None   Other Topics Concern   • None   Social History Narrative    Nonsmoker - as per [employer]      Social Determinants of Health     Financial Resource Strain: Not on file   Food Insecurity: Not on file   Transportation Needs: Not on file   Physical Activity: Inactive (2021)    Exercise Vital Sign    • Days of Exercise per Week: 0 days    • Minutes of Exercise per Session: 0 "min   Stress: No Stress Concern Present (8/2/2021)    [address]    • Feeling of Stress : Only a little   Social Connections: Unknown (6/9/2021)    Social Connection and Isolation Panel [NHANES]    • Frequency of Communication with Friends and Family: Not on file    • Frequency of Social Gatherings with Friends and Family: Not on file    • Attends [employer] Services: Not on file    • Active Member of Clubs or Organizations: Not on file    • Attends Club or Organization Meetings: Not on file    • Marital Status:    Intimate Partner Violence: Not At Risk (4/5/2022)    Humiliation, Afraid, Rape, and Kick questionnaire    • Fear of Current or Ex-Partner: No    • Emotionally Abused: No    • Physically Abused: No    • Sexually Abused: No   Housing Stability: Not on file      Family History:     Family History   Problem Relation Age of Onset   • Hypertension Mother    • Diabetes Mother    • Hypertension Father    • Diabetes Father    • Colon polyps Neg Hx    • Colon cancer Neg Hx       Current Medications:     Current Outpatient Medications   Medication Sig Dispense Refill   • losartan-hydrochlorothiazide (HYZAAR) 100-25 MG per tablet Take 1 tablet by mouth daily 90 tablet 1   • rosuvastatin (CRESTOR) 10 MG tablet take 1 tablet by mouth once daily 90 tablet 0     No current facility-administered medications for this visit  Allergies: Allergies   Allergen Reactions   • Amoxicillin Rash      Physical Exam:     /84 (BP Location: Right arm, Patient Position: Sitting, Cuff Size: Standard)   Pulse 86   Temp 98 2 °F (36 8 °C) (Tympanic)   Resp 16   Ht 5' 0 7\" (1 542 m)   Wt 68 2 kg (150 lb 6 4 oz)   LMP 06/25/2023 (Exact Date)   SpO2 97%   BMI 28 70 kg/m²     Physical Exam  Vitals and nursing note reviewed  Constitutional:       Appearance: Normal appearance  She is not ill-appearing  HENT:      Head: Normocephalic and atraumatic   " Right Ear: Tympanic membrane, ear canal and external ear normal  There is no impacted cerumen  Left Ear: Tympanic membrane, ear canal and external ear normal  There is no impacted cerumen  Nose: Nose normal  No congestion  Mouth/Throat:      Mouth: Mucous membranes are moist       Pharynx: Oropharynx is clear  No oropharyngeal exudate or posterior oropharyngeal erythema  Eyes:      General:         Right eye: No discharge  Left eye: No discharge  Extraocular Movements: Extraocular movements intact  Conjunctiva/sclera: Conjunctivae normal       Pupils: Pupils are equal, round, and reactive to light  Cardiovascular:      Rate and Rhythm: Normal rate and regular rhythm  Pulses: Normal pulses  Heart sounds: Normal heart sounds  No murmur heard  Pulmonary:      Effort: Pulmonary effort is normal       Breath sounds: Normal breath sounds  Abdominal:      General: Bowel sounds are normal       Palpations: Abdomen is soft  Tenderness: There is no right CVA tenderness or left CVA tenderness  Musculoskeletal:         General: Normal range of motion  Cervical back: Normal range of motion and neck supple  No rigidity or tenderness  Right lower leg: No edema  Left lower leg: No edema  Lymphadenopathy:      Cervical: No cervical adenopathy  Skin:     General: Skin is warm and dry  Capillary Refill: Capillary refill takes less than 2 seconds  Neurological:      Mental Status: She is alert and oriented to person, place, and time  Psychiatric:         Mood and Affect: Mood normal          Behavior: Behavior normal          Thought Content:  Thought content normal          Judgment: Judgment normal           Kemi Tolbert, [address] [place] [address]

## 2023-07-21 DIAGNOSIS — E78.41 ELEVATED LIPOPROTEIN(A): ICD-10-CM

## 2023-07-21 DIAGNOSIS — E78.2 MIXED HYPERLIPIDEMIA: ICD-10-CM

## 2023-07-21 RX ORDER — LOSARTAN POTASSIUM AND HYDROCHLOROTHIAZIDE 25; 100 MG/1; MG/1
1 TABLET ORAL DAILY
Qty: 90 TABLET | Refills: 1 | Status: SHIPPED | OUTPATIENT
Start: 2023-07-21

## 2023-07-21 RX ORDER — ROSUVASTATIN CALCIUM 10 MG/1
10 TABLET, COATED ORAL DAILY
Qty: 90 TABLET | Refills: 1 | Status: SHIPPED | OUTPATIENT
Start: 2023-07-21

## 2023-08-03 ENCOUNTER — APPOINTMENT (OUTPATIENT)
Dept: LAB | Facility: CLINIC | Age: 38
End: 2023-08-03
Payer: COMMERCIAL

## 2023-08-03 DIAGNOSIS — Z13.6 SCREENING FOR ISCHEMIC HEART DISEASE: ICD-10-CM

## 2023-08-03 DIAGNOSIS — Z13.29 SCREENING FOR THYROID DISORDER: ICD-10-CM

## 2023-08-03 DIAGNOSIS — Z13.1 SCREENING FOR DIABETES MELLITUS: ICD-10-CM

## 2023-08-03 DIAGNOSIS — E78.5 HYPERLIPIDEMIA, UNSPECIFIED HYPERLIPIDEMIA TYPE: ICD-10-CM

## 2023-08-03 LAB
ALBUMIN SERPL BCP-MCNC: 4 G/DL (ref 3.5–5)
ALP SERPL-CCNC: 50 U/L (ref 46–116)
ALT SERPL W P-5'-P-CCNC: 23 U/L (ref 12–78)
ANION GAP SERPL CALCULATED.3IONS-SCNC: 6 MMOL/L
AST SERPL W P-5'-P-CCNC: 16 U/L (ref 5–45)
BASOPHILS # BLD AUTO: 0.04 THOUSANDS/ÂΜL (ref 0–0.1)
BASOPHILS NFR BLD AUTO: 0 % (ref 0–1)
BILIRUB SERPL-MCNC: 0.37 MG/DL (ref 0.2–1)
BUN SERPL-MCNC: 17 MG/DL (ref 5–25)
CALCIUM SERPL-MCNC: 9.4 MG/DL (ref 8.3–10.1)
CHLORIDE SERPL-SCNC: 105 MMOL/L (ref 96–108)
CHOLEST SERPL-MCNC: 222 MG/DL
CO2 SERPL-SCNC: 25 MMOL/L (ref 21–32)
CREAT SERPL-MCNC: 0.83 MG/DL (ref 0.6–1.3)
EOSINOPHIL # BLD AUTO: 0.2 THOUSAND/ÂΜL (ref 0–0.61)
EOSINOPHIL NFR BLD AUTO: 2 % (ref 0–6)
ERYTHROCYTE [DISTWIDTH] IN BLOOD BY AUTOMATED COUNT: 12.6 % (ref 11.6–15.1)
EST. AVERAGE GLUCOSE BLD GHB EST-MCNC: 120 MG/DL
GFR SERPL CREATININE-BSD FRML MDRD: 90 ML/MIN/1.73SQ M
GLUCOSE P FAST SERPL-MCNC: 99 MG/DL (ref 65–99)
HBA1C MFR BLD: 5.8 %
HCT VFR BLD AUTO: 41.2 % (ref 34.8–46.1)
HDLC SERPL-MCNC: 65 MG/DL
HGB BLD-MCNC: 13.9 G/DL (ref 11.5–15.4)
IMM GRANULOCYTES # BLD AUTO: 0.01 THOUSAND/UL (ref 0–0.2)
IMM GRANULOCYTES NFR BLD AUTO: 0 % (ref 0–2)
LDLC SERPL CALC-MCNC: 106 MG/DL (ref 0–100)
LYMPHOCYTES # BLD AUTO: 4.75 THOUSANDS/ÂΜL (ref 0.6–4.47)
LYMPHOCYTES NFR BLD AUTO: 54 % (ref 14–44)
MCH RBC QN AUTO: 30.5 PG (ref 26.8–34.3)
MCHC RBC AUTO-ENTMCNC: 33.7 G/DL (ref 31.4–37.4)
MCV RBC AUTO: 91 FL (ref 82–98)
MONOCYTES # BLD AUTO: 0.48 THOUSAND/ÂΜL (ref 0.17–1.22)
MONOCYTES NFR BLD AUTO: 5 % (ref 4–12)
NEUTROPHILS # BLD AUTO: 3.5 THOUSANDS/ÂΜL (ref 1.85–7.62)
NEUTS SEG NFR BLD AUTO: 39 % (ref 43–75)
NONHDLC SERPL-MCNC: 157 MG/DL
NRBC BLD AUTO-RTO: 0 /100 WBCS
PLATELET # BLD AUTO: 485 THOUSANDS/UL (ref 149–390)
PMV BLD AUTO: 10.1 FL (ref 8.9–12.7)
POTASSIUM SERPL-SCNC: 4.1 MMOL/L (ref 3.5–5.3)
PROT SERPL-MCNC: 8.5 G/DL (ref 6.4–8.4)
RBC # BLD AUTO: 4.55 MILLION/UL (ref 3.81–5.12)
SODIUM SERPL-SCNC: 136 MMOL/L (ref 135–147)
TRIGL SERPL-MCNC: 254 MG/DL
TSH SERPL DL<=0.05 MIU/L-ACNC: 0.75 UIU/ML (ref 0.45–4.5)
WBC # BLD AUTO: 8.98 THOUSAND/UL (ref 4.31–10.16)

## 2023-08-03 PROCEDURE — 36415 COLL VENOUS BLD VENIPUNCTURE: CPT

## 2023-08-03 PROCEDURE — 80061 LIPID PANEL: CPT

## 2023-08-03 PROCEDURE — 83036 HEMOGLOBIN GLYCOSYLATED A1C: CPT

## 2023-08-03 PROCEDURE — 84443 ASSAY THYROID STIM HORMONE: CPT

## 2023-08-03 PROCEDURE — 80053 COMPREHEN METABOLIC PANEL: CPT

## 2023-08-03 PROCEDURE — 85025 COMPLETE CBC W/AUTO DIFF WBC: CPT

## 2023-08-04 ENCOUNTER — HOSPITAL ENCOUNTER (OUTPATIENT)
Dept: ULTRASOUND IMAGING | Facility: HOSPITAL | Age: 38
End: 2023-08-04
Attending: OBSTETRICS & GYNECOLOGY
Payer: COMMERCIAL

## 2023-08-04 ENCOUNTER — TELEPHONE (OUTPATIENT)
Dept: FAMILY MEDICINE CLINIC | Facility: CLINIC | Age: 38
End: 2023-08-04

## 2023-08-04 DIAGNOSIS — D21.9 FIBROID: ICD-10-CM

## 2023-08-04 PROCEDURE — 76830 TRANSVAGINAL US NON-OB: CPT

## 2023-08-04 PROCEDURE — 76856 US EXAM PELVIC COMPLETE: CPT

## 2023-08-04 NOTE — TELEPHONE ENCOUNTER
Patient and her  Tamsen Cabot called in reference to a missed call from the office. They were given the message by Dr. Joe Lackey. Patient scheduled an appointment with Caty Chou to review lab results in person.

## 2023-08-04 NOTE — TELEPHONE ENCOUNTER
Patients  called. They are concerned about the blood work results that were just done. Patient was on the line also, because the patient wants him to be on call as well, so she can understand better. I told him I would leave a message for you and we would call them back. [phone]  Thank you.

## 2023-08-04 NOTE — TELEPHONE ENCOUNTER
Left vm and asked them to call and schedule an appointment if they would like to review the lab work in depth

## 2023-08-10 ENCOUNTER — OFFICE VISIT (OUTPATIENT)
Dept: FAMILY MEDICINE CLINIC | Facility: CLINIC | Age: 38
End: 2023-08-10
Payer: COMMERCIAL

## 2023-08-10 VITALS
HEART RATE: 97 BPM | HEIGHT: 61 IN | BODY MASS INDEX: 28.05 KG/M2 | RESPIRATION RATE: 16 BRPM | TEMPERATURE: 98.9 F | WEIGHT: 148.6 LBS | OXYGEN SATURATION: 99 %

## 2023-08-10 DIAGNOSIS — R10.84 GENERALIZED ABDOMINAL PAIN: ICD-10-CM

## 2023-08-10 DIAGNOSIS — K59.00 CONSTIPATION, UNSPECIFIED CONSTIPATION TYPE: Primary | ICD-10-CM

## 2023-08-10 DIAGNOSIS — E78.2 MIXED HYPERLIPIDEMIA: ICD-10-CM

## 2023-08-10 DIAGNOSIS — I10 PRIMARY HYPERTENSION: ICD-10-CM

## 2023-08-10 PROCEDURE — 99214 OFFICE O/P EST MOD 30 MIN: CPT | Performed by: PHYSICIAN ASSISTANT

## 2023-08-10 RX ORDER — POLYETHYLENE GLYCOL 3350 17 G/17G
17 POWDER, FOR SOLUTION ORAL 2 TIMES DAILY
Qty: 850 G | Refills: 1 | Status: SHIPPED | OUTPATIENT
Start: 2023-08-10 | End: 2023-08-21

## 2023-08-14 NOTE — PROGRESS NOTES
Name: Say Calhoun      : 1985      MRN: [id number]  Encounter Provider: Blaze Maria PA-C  Encounter Date: 8/10/2023   Encounter department: [facility]    Assessment & Plan     1. Constipation, unspecified constipation type  -     polyethylene glycol (GLYCOLAX) 17 GM/SCOOP powder; Take 17 g by mouth 2 (two) times a day    2. Generalized abdominal pain  -     CBC and differential  -     Comprehensive metabolic panel; Future    3. Primary hypertension    4. Mixed hyperlipidemia    MiraLAX twice daily. Good hydration. Increase activity. Increase dietary fiber  Lab work pending. Call with any questions concerns persistent or worsening symptoms. Go to the ED if unable to contact office and develop worsening symptoms. Subjective      HPI   80-year-old female here today with complaint of generalized abdominal discomfort which has been waxing and waning for the past 2 weeks. Patient states has been having abnormal bowel movements and is complaining of constipation. Patient recently had lab work with CBC showing no leukocytosis. And CMP was normal.  Discussed good hydration, increase activity and laxative medications. Denies any fevers or chills. Denies any dyspnea, chest pain, nausea, vomiting, blood in stool, melena or CVA tenderness. All other systems negative. Review of Systems  As per HPI    Current Outpatient Medications on File Prior to Visit   Medication Sig   • losartan-hydrochlorothiazide (HYZAAR) 100-25 MG per tablet Take 1 tablet by mouth daily   • rosuvastatin (CRESTOR) 10 MG tablet Take 1 tablet (10 mg total) by mouth daily       Objective     Pulse 97   Temp 98.9 °F (37.2 °C) (Tympanic)   Resp 16   Ht 5' 0.7" (1.542 m)   Wt 67.4 kg (148 lb 9.6 oz)   LMP 2023   SpO2 99%   BMI 28.36 kg/m²     Physical Exam  Vitals reviewed. Constitutional:       General: She is not in acute distress.      Appearance: She is not ill-appearing, toxic-appearing or diaphoretic. HENT:      Head: Normocephalic and atraumatic. Mouth/Throat:      Mouth: Mucous membranes are moist.   Cardiovascular:      Rate and Rhythm: Normal rate and regular rhythm. Heart sounds: Normal heart sounds. Pulmonary:      Effort: Pulmonary effort is normal.      Breath sounds: Normal breath sounds. Abdominal:      General: Abdomen is flat. Bowel sounds are normal. There is no distension. There are no signs of injury. Palpations: Abdomen is soft. There is no hepatomegaly or splenomegaly. Tenderness: There is no abdominal tenderness. There is no right CVA tenderness, left CVA tenderness, guarding or rebound. Negative signs include Bueno's sign and McBurney's sign. Hernia: No hernia is present. Comments: Patient currently without pain during examination. Some left lower quadrant fullness with palpation   Neurological:      General: No focal deficit present. Mental Status: She is alert and oriented to person, place, and time.    Psychiatric:         Mood and Affect: Mood normal.       Pastor Trang PA-C

## 2023-08-21 ENCOUNTER — ANNUAL EXAM (OUTPATIENT)
Dept: GYNECOLOGY | Facility: CLINIC | Age: 38
End: 2023-08-21
Payer: COMMERCIAL

## 2023-08-21 VITALS
HEIGHT: 60 IN | BODY MASS INDEX: 29.13 KG/M2 | DIASTOLIC BLOOD PRESSURE: 88 MMHG | WEIGHT: 148.4 LBS | SYSTOLIC BLOOD PRESSURE: 150 MMHG

## 2023-08-21 DIAGNOSIS — Z01.419 ENCOUNTER FOR ANNUAL ROUTINE GYNECOLOGICAL EXAMINATION: Primary | ICD-10-CM

## 2023-08-21 PROCEDURE — 99395 PREV VISIT EST AGE 18-39: CPT | Performed by: OBSTETRICS & GYNECOLOGY

## 2023-08-21 NOTE — PROGRESS NOTES
Assessment/Plan:    pap is up to date    Discussed self breast exams    Contraception - Essure. It was not mentioned on her recent US or her prior ones, sent a message to radiology to see if they can see it. discussed preventive care, regular exercise and a healthy diet      No problem-specific Assessment & Plan notes found for this encounter. Diagnoses and all orders for this visit:    Encounter for annual routine gynecological examination          Subjective:      Patient ID: Alin Low is a 40 y.o. female. Pt here for yearly. She has no complaints. Menstrual cycles are regular. Earlier this month, she had a normal pelvic ultrasound. There was a question of a lipoleiomyoma last year but there was no lesion seen this year. They did not mention her Essure. She had a done about 10 years ago    She recently started exercising as she was at her family doctor and her cholesterol was high. Normal Pap, negative HPV in 2022      The following portions of the patient's history were reviewed and updated as appropriate: allergies, current medications, past family history, past medical history, past social history, past surgical history and problem list.    Review of Systems   Constitutional: Negative. Gastrointestinal: Negative. Genitourinary: Negative. Objective:      /88 (BP Location: Left arm, Patient Position: Sitting)   Ht 5' (1.524 m)   Wt 67.3 kg (148 lb 6.4 oz)   LMP 07/26/2023   BMI 28.98 kg/m²          Physical Exam  Vitals reviewed. Constitutional:       Appearance: She is well-developed. Neck:      Thyroid: No thyromegaly. Trachea: No tracheal deviation. Cardiovascular:      Rate and Rhythm: Normal rate and regular rhythm. Pulmonary:      Effort: Pulmonary effort is normal.      Breath sounds: Normal breath sounds. Chest:   Breasts:     Breasts are symmetrical.      Right: No inverted nipple, mass, nipple discharge, skin change or tenderness. Left: No inverted nipple, mass, nipple discharge, skin change or tenderness. Abdominal:      General: There is no distension. Palpations: Abdomen is soft. There is no mass. Tenderness: There is no abdominal tenderness. Genitourinary:     Labia:         Right: No rash, tenderness, lesion or injury. Left: No rash, tenderness, lesion or injury. Vagina: Normal.      Cervix: No cervical motion tenderness, discharge or friability. Adnexa:         Right: No mass, tenderness or fullness. Left: No mass, tenderness or fullness.

## 2023-09-07 ENCOUNTER — TELEPHONE (OUTPATIENT)
Dept: GYNECOLOGY | Facility: CLINIC | Age: 38
End: 2023-09-07

## 2023-09-07 NOTE — TELEPHONE ENCOUNTER
Patient informed. She will discuss this with her , think about it, and call back with a decision about having an x-ray.

## 2023-09-07 NOTE — TELEPHONE ENCOUNTER
----- Message from Patricio King DO sent at 9/6/2023 10:30 AM EDT -----  Regarding: FW: Piter  This patient had an Essure about 10 years ago. Blockage of the tubes was confirmed with an HSG at the time. Since then she has not used anything further for birth control. She had a recent ultrasound for an unrelated reason and the Essure was not noted. I thought this was odd and asked the radiologist to look at this and a prior ultrasound that she had. He did not see it on either study. He said sometimes it can be due to positioning of the uterus. I wanted to let her know that it was not visible. It is likely that the coils are in place and are working as she has not had any issues over the last 10 years. If we wanted to confirm they are still there, she would need an x-ray. I just wanted to let her know. She has no concerns and again most likely everything is fine however I did notice this and I just wanted to let her know. If she would like an x-ray, let me know and I will order one. This was over a week ago but it took some time for me to discuss with the radiologist.    Thank you  ----- Message -----  From: Sam Klein DO  Sent: 8/24/2023   1:49 PM EDT  To: Patricio King DO  Subject: RE: Piter                                       Anytime! Have a great weekend!  ----- Message -----  From: Patricio King DO  Sent: 8/24/2023   8:41 AM EDT  To: Sam Klein DO  Subject: RE: Piter                                       Totally agree, just happened to notice that they never mentioned it but when I looked further, she did have confirmatory HSG. It is always something! thanks  ----- Message -----  From: Sam Klein DO  Sent: 8/23/2023   4:03 PM EDT  To: Patricio King DO  Subject: Piter                                           If it ain't broke, don't fix it. My guess is that it's still doing it's job.  If she's not having any pelvic pain symptoms, AUB or any other unusual symptoms related to her pelvis she's probably fine. She had confirmatory HSG as well which is also reassuring. However, let her know, which you probably have done already you can't guarantee it's efficacy and recommend appropriate alternatives if she is interested. It's always something! Good lucFreddy white   ----- Message -----  From: Klever Andrews DO  Sent: 8/22/2023  11:29 AM EDT  To: Mary Driscoll MD; Portia Schirmer, DO    I saw this patient for her yearly yesterday. She had a normal ultrasound for an unrelated issue. She has a history of having an Essure in 2014. They did not see the Essure on any of her ultrasounds, she has had 3 over the past couple years. The radiologist said that typically they can see them but sometimes the position of the uterus could affect this. I looked back today and saw that she did have a follow-up HSG in 2014 that showed blockage of her tubes. Do think she needs anything further?   She has been using this as her method of contraception for 9 years without any problem    thanks

## 2023-09-07 NOTE — TELEPHONE ENCOUNTER
Patient called back. She decided she does want an X-Ray to check Essure placement. She wants the order mailed to her home.

## 2023-09-08 DIAGNOSIS — Z30.8 ENCOUNTER FOR POST ESSURE STERILIZATION CHECK: Primary | ICD-10-CM

## 2023-10-24 ENCOUNTER — OFFICE VISIT (OUTPATIENT)
Dept: FAMILY MEDICINE CLINIC | Facility: CLINIC | Age: 38
End: 2023-10-24
Payer: COMMERCIAL

## 2023-10-24 VITALS
DIASTOLIC BLOOD PRESSURE: 90 MMHG | TEMPERATURE: 98.3 F | OXYGEN SATURATION: 99 % | HEART RATE: 95 BPM | SYSTOLIC BLOOD PRESSURE: 130 MMHG | HEIGHT: 60 IN | BODY MASS INDEX: 27.68 KG/M2 | WEIGHT: 141 LBS

## 2023-10-24 DIAGNOSIS — R10.11 RUQ ABDOMINAL PAIN: Primary | ICD-10-CM

## 2023-10-24 PROCEDURE — 99214 OFFICE O/P EST MOD 30 MIN: CPT | Performed by: FAMILY MEDICINE

## 2023-10-24 NOTE — PROGRESS NOTES
Thuan Diaz 1985 female MRN: [id number]    Family Medicine Acute Visit    ASSESSMENT/PLAN  Problem List Items Addressed This Visit          Other    RUQ abdominal pain - Primary    Relevant Orders    US abdomen complete    Comprehensive metabolic panel    CBC and differential    Lipase       ER precautions discussed for any acute worsening of symptoms       Future Appointments   Date Time Provider [address]   2024  1:10 PM JOSE Babcock [facility]   2024  1:00 PM DO ANN Chan [facility]          SUBJECTIVE  CC: Abdominal Pain (Right upper quadrant pain for 2 days)      HPI:  Thuan Diaz is a 40 y.o. female who presents for acute visit. Patient reports pain in her RUQ x 2 days worse with eating. No vomiting, no diarrhea. Took some advil which did help. No difficulty with urination. History of c/s. No fever. Review of Systems   Constitutional:  Negative for chills, fatigue and fever. HENT:  Negative for congestion, postnasal drip, rhinorrhea and sinus pressure. Eyes:  Negative for photophobia and visual disturbance. Respiratory:  Negative for cough and shortness of breath. Cardiovascular:  Negative for chest pain, palpitations and leg swelling. Gastrointestinal:  Positive for abdominal pain. Negative for constipation, diarrhea, nausea and vomiting. Genitourinary:  Negative for difficulty urinating and dysuria. Musculoskeletal:  Negative for arthralgias and myalgias. Skin:  Negative for color change and rash. Neurological:  Negative for dizziness, weakness, light-headedness and headaches.        Historical Information   The patient history was reviewed as follows:  Past Medical History:   Diagnosis Date    Hypertension          Past Surgical History:   Procedure Laterality Date     SECTION       Family History   Problem Relation Age of Onset    Hypertension Mother     Diabetes Mother     Hypertension Father     Diabetes Father     Colon polyps Neg Hx     Colon cancer Neg Hx       Social History   Social History     Substance and Sexual Activity   Alcohol Use Never    Comment: Social      Social History     Substance and Sexual Activity   Drug Use Never     Social History     Tobacco Use   Smoking Status Former   Smokeless Tobacco Never       Medications:     Current Outpatient Medications:     losartan-hydrochlorothiazide (HYZAAR) 100-25 MG per tablet, Take 1 tablet by mouth daily, Disp: 90 tablet, Rfl: 1    rosuvastatin (CRESTOR) 10 MG tablet, Take 1 tablet (10 mg total) by mouth daily, Disp: 90 tablet, Rfl: 1    Allergies   Allergen Reactions    Amoxicillin Rash       OBJECTIVE  Vitals:   Vitals:    10/24/23 1209   BP: 130/90   BP Location: Left arm   Patient Position: Sitting   Cuff Size: Standard   Pulse: 95   Temp: 98.3 °F (36.8 °C)   TempSrc: Tympanic   SpO2: 99%   Weight: 64 kg (141 lb)   Height: 5' (1.524 m)         Physical Exam  Constitutional:       Appearance: She is well-developed. HENT:      Head: Normocephalic and atraumatic. Eyes:      Pupils: Pupils are equal, round, and reactive to light. Cardiovascular:      Rate and Rhythm: Normal rate and regular rhythm. Heart sounds: Normal heart sounds. Pulmonary:      Effort: Pulmonary effort is normal. No respiratory distress. Breath sounds: Normal breath sounds. No wheezing. Abdominal:      General: Bowel sounds are normal. There is no distension. Palpations: Abdomen is soft. Tenderness: There is abdominal tenderness. Musculoskeletal:         General: No tenderness. Normal range of motion. Cervical back: Normal range of motion and neck supple. Skin:     General: Skin is warm and dry. Neurological:      Mental Status: She is alert and oriented to person, place, and time.    Psychiatric:         Behavior: Behavior normal.                    Inga Russo DO    10/24/2023

## 2023-10-25 ENCOUNTER — APPOINTMENT (OUTPATIENT)
Dept: LAB | Facility: CLINIC | Age: 38
End: 2023-10-25
Payer: COMMERCIAL

## 2023-10-25 DIAGNOSIS — R10.11 RUQ ABDOMINAL PAIN: ICD-10-CM

## 2023-10-25 LAB
ALBUMIN SERPL BCP-MCNC: 4.7 G/DL (ref 3.5–5)
ALP SERPL-CCNC: 41 U/L (ref 34–104)
ALT SERPL W P-5'-P-CCNC: 9 U/L (ref 7–52)
ANION GAP SERPL CALCULATED.3IONS-SCNC: 11 MMOL/L
AST SERPL W P-5'-P-CCNC: 11 U/L (ref 13–39)
BASOPHILS # BLD AUTO: 0.02 THOUSANDS/ÂΜL (ref 0–0.1)
BASOPHILS NFR BLD AUTO: 0 % (ref 0–1)
BILIRUB SERPL-MCNC: 0.41 MG/DL (ref 0.2–1)
BUN SERPL-MCNC: 14 MG/DL (ref 5–25)
CALCIUM SERPL-MCNC: 9.6 MG/DL (ref 8.4–10.2)
CHLORIDE SERPL-SCNC: 101 MMOL/L (ref 96–108)
CO2 SERPL-SCNC: 28 MMOL/L (ref 21–32)
CREAT SERPL-MCNC: 0.63 MG/DL (ref 0.6–1.3)
EOSINOPHIL # BLD AUTO: 0.17 THOUSAND/ÂΜL (ref 0–0.61)
EOSINOPHIL NFR BLD AUTO: 2 % (ref 0–6)
ERYTHROCYTE [DISTWIDTH] IN BLOOD BY AUTOMATED COUNT: 12.9 % (ref 11.6–15.1)
GFR SERPL CREATININE-BSD FRML MDRD: 114 ML/MIN/1.73SQ M
GLUCOSE SERPL-MCNC: 95 MG/DL (ref 65–140)
HCT VFR BLD AUTO: 41.1 % (ref 34.8–46.1)
HGB BLD-MCNC: 13.7 G/DL (ref 11.5–15.4)
IMM GRANULOCYTES # BLD AUTO: 0.03 THOUSAND/UL (ref 0–0.2)
IMM GRANULOCYTES NFR BLD AUTO: 0 % (ref 0–2)
LIPASE SERPL-CCNC: 24 U/L (ref 11–82)
LYMPHOCYTES # BLD AUTO: 3.98 THOUSANDS/ÂΜL (ref 0.6–4.47)
LYMPHOCYTES NFR BLD AUTO: 41 % (ref 14–44)
MCH RBC QN AUTO: 30.9 PG (ref 26.8–34.3)
MCHC RBC AUTO-ENTMCNC: 33.3 G/DL (ref 31.4–37.4)
MCV RBC AUTO: 93 FL (ref 82–98)
MONOCYTES # BLD AUTO: 0.74 THOUSAND/ÂΜL (ref 0.17–1.22)
MONOCYTES NFR BLD AUTO: 8 % (ref 4–12)
NEUTROPHILS # BLD AUTO: 4.77 THOUSANDS/ÂΜL (ref 1.85–7.62)
NEUTS SEG NFR BLD AUTO: 49 % (ref 43–75)
NRBC BLD AUTO-RTO: 0 /100 WBCS
PLATELET # BLD AUTO: 402 THOUSANDS/UL (ref 149–390)
PMV BLD AUTO: 10.9 FL (ref 8.9–12.7)
POTASSIUM SERPL-SCNC: 3.9 MMOL/L (ref 3.5–5.3)
PROT SERPL-MCNC: 8 G/DL (ref 6.4–8.4)
RBC # BLD AUTO: 4.44 MILLION/UL (ref 3.81–5.12)
SODIUM SERPL-SCNC: 140 MMOL/L (ref 135–147)
WBC # BLD AUTO: 9.71 THOUSAND/UL (ref 4.31–10.16)

## 2023-10-25 PROCEDURE — 83690 ASSAY OF LIPASE: CPT

## 2023-10-25 PROCEDURE — 36415 COLL VENOUS BLD VENIPUNCTURE: CPT

## 2023-10-25 PROCEDURE — 80053 COMPREHEN METABOLIC PANEL: CPT

## 2023-11-02 ENCOUNTER — HOSPITAL ENCOUNTER (OUTPATIENT)
Dept: ULTRASOUND IMAGING | Facility: CLINIC | Age: 38
Discharge: HOME | End: 2023-11-02
Payer: COMMERCIAL

## 2023-11-02 DIAGNOSIS — R10.11 RUQ ABDOMINAL PAIN: ICD-10-CM

## 2023-11-02 PROCEDURE — 76700 US EXAM ABDOM COMPLETE: CPT

## 2024-01-02 DIAGNOSIS — E78.2 MIXED HYPERLIPIDEMIA: ICD-10-CM

## 2024-01-02 RX ORDER — LOSARTAN POTASSIUM AND HYDROCHLOROTHIAZIDE 25; 100 MG/1; MG/1
1 TABLET ORAL DAILY
Qty: 90 TABLET | Refills: 1 | Status: SHIPPED | OUTPATIENT
Start: 2024-01-02

## 2024-01-09 ENCOUNTER — OFFICE VISIT (OUTPATIENT)
Dept: FAMILY MEDICINE CLINIC | Facility: CLINIC | Age: 39
End: 2024-01-09
Payer: COMMERCIAL

## 2024-01-09 VITALS
RESPIRATION RATE: 16 BRPM | HEART RATE: 82 BPM | WEIGHT: 140 LBS | TEMPERATURE: 99.1 F | BODY MASS INDEX: 27.48 KG/M2 | SYSTOLIC BLOOD PRESSURE: 126 MMHG | HEIGHT: 60 IN | OXYGEN SATURATION: 100 % | DIASTOLIC BLOOD PRESSURE: 70 MMHG

## 2024-01-09 DIAGNOSIS — I10 PRIMARY HYPERTENSION: Primary | ICD-10-CM

## 2024-01-09 PROCEDURE — 99213 OFFICE O/P EST LOW 20 MIN: CPT | Performed by: NURSE PRACTITIONER

## 2024-01-09 NOTE — PROGRESS NOTES
Assessment/Plan   Problem List Items Addressed This Visit          Cardiovascular and Mediastinum    Hypertension - Primary             Chief Complaint   Patient presents with    Follow-up     6 month check        Subjective   Patient ID: Emily Teague is a 38 y.o. female.    Vitals:    01/09/24 1321   BP: 126/70   Pulse: 82   Resp: 16   Temp: 99.1 °F (37.3 °C)   SpO2: 100%     Reports an incident that occurred yesterday when her car broke down - pain radiated across her shoulders and she felt dizzy but resolved on own  Denies any symptoms today- denies chest pain - admits that she was under great stress  at the time     Hypertension  This is a chronic problem. The current episode started more than 1 year ago. The problem has been gradually improving since onset. The problem is controlled. Associated symptoms include anxiety. Pertinent negatives include no blurred vision, chest pain, neck pain, orthopnea, peripheral edema or shortness of breath. There are no associated agents to hypertension. Risk factors for coronary artery disease include stress, family history and dyslipidemia. Past treatments include angiotensin blockers and diuretics. The current treatment provides significant improvement. There are no compliance problems.        The following portions of the patient's history were reviewed and updated as appropriate: allergies, current medications, past family history, past social history, past surgical history, and problem list.    Review of Systems   Constitutional: Negative.    HENT: Negative.     Eyes: Negative.  Negative for blurred vision.   Respiratory: Negative.  Negative for shortness of breath.    Cardiovascular: Negative.  Negative for chest pain and orthopnea.   Gastrointestinal: Negative.    Endocrine: Negative.    Genitourinary: Negative.    Musculoskeletal: Negative.  Negative for neck pain.   Skin: Negative.    Allergic/Immunologic: Negative.    Neurological: Negative.    Hematological:  Negative.    Psychiatric/Behavioral: Negative.         Objective     Physical Exam  Vitals and nursing note reviewed.   Constitutional:       Appearance: Normal appearance. She is not ill-appearing.   HENT:      Head: Normocephalic and atraumatic.      Nose: Nose normal. No congestion.   Eyes:      Extraocular Movements: Extraocular movements intact.      Conjunctiva/sclera: Conjunctivae normal.   Cardiovascular:      Rate and Rhythm: Normal rate and regular rhythm.      Pulses: Normal pulses.      Heart sounds: Normal heart sounds. No murmur heard.  Pulmonary:      Effort: Pulmonary effort is normal.      Breath sounds: Normal breath sounds.   Abdominal:      Palpations: Abdomen is soft.   Musculoskeletal:         General: Normal range of motion.      Cervical back: Normal range of motion. No tenderness.      Right lower leg: No edema.      Left lower leg: No edema.   Lymphadenopathy:      Cervical: No cervical adenopathy.   Skin:     General: Skin is warm and dry.      Capillary Refill: Capillary refill takes less than 2 seconds.   Neurological:      Mental Status: She is alert and oriented to person, place, and time.   Psychiatric:         Mood and Affect: Mood normal.         Behavior: Behavior normal.         Thought Content: Thought content normal.         Judgment: Judgment normal.

## 2024-01-10 ENCOUNTER — HOSPITAL ENCOUNTER (OUTPATIENT)
Facility: HOSPITAL | Age: 39
Setting detail: OBSERVATION
Discharge: HOME | End: 2024-01-12
Attending: EMERGENCY MEDICINE | Admitting: INTERNAL MEDICINE
Payer: COMMERCIAL

## 2024-01-10 ENCOUNTER — APPOINTMENT (EMERGENCY)
Dept: CT IMAGING | Facility: HOSPITAL | Age: 39
End: 2024-01-10
Attending: EMERGENCY MEDICINE
Payer: COMMERCIAL

## 2024-01-10 ENCOUNTER — TELEPHONE (OUTPATIENT)
Dept: FAMILY MEDICINE CLINIC | Facility: CLINIC | Age: 39
End: 2024-01-10

## 2024-01-10 ENCOUNTER — APPOINTMENT (EMERGENCY)
Dept: RADIOLOGY | Facility: HOSPITAL | Age: 39
End: 2024-01-10
Payer: COMMERCIAL

## 2024-01-10 DIAGNOSIS — R20.0 NUMBNESS AND TINGLING: Primary | ICD-10-CM

## 2024-01-10 DIAGNOSIS — R29.90 STROKE-LIKE SYMPTOMS: ICD-10-CM

## 2024-01-10 DIAGNOSIS — R20.2 NUMBNESS AND TINGLING: Primary | ICD-10-CM

## 2024-01-10 PROBLEM — R65.10 SIRS (SYSTEMIC INFLAMMATORY RESPONSE SYNDROME) (HCC): Status: ACTIVE | Noted: 2024-01-10

## 2024-01-10 PROBLEM — D72.829 LEUKOCYTOSIS: Status: ACTIVE | Noted: 2024-01-10

## 2024-01-10 LAB
ANION GAP SERPL CALCULATED.3IONS-SCNC: 12 MMOL/L
APTT PPP: 26 SECONDS (ref 23–37)
ATRIAL RATE: 78 BPM
ATRIAL RATE: 86 BPM
ATRIAL RATE: 90 BPM
BACTERIA UR QL AUTO: NORMAL /HPF
BILIRUB UR QL STRIP: NEGATIVE
BUN SERPL-MCNC: 15 MG/DL (ref 5–25)
CALCIUM SERPL-MCNC: 9.4 MG/DL (ref 8.4–10.2)
CARDIAC TROPONIN I PNL SERPL HS: <2 NG/L
CARDIAC TROPONIN I PNL SERPL HS: <2 NG/L
CHLORIDE SERPL-SCNC: 100 MMOL/L (ref 96–108)
CLARITY UR: CLEAR
CO2 SERPL-SCNC: 22 MMOL/L (ref 21–32)
COLOR UR: YELLOW
CREAT SERPL-MCNC: 0.75 MG/DL (ref 0.6–1.3)
ERYTHROCYTE [DISTWIDTH] IN BLOOD BY AUTOMATED COUNT: 12.4 % (ref 11.6–15.1)
FLUAV RNA RESP QL NAA+PROBE: NEGATIVE
FLUBV RNA RESP QL NAA+PROBE: NEGATIVE
GFR SERPL CREATININE-BSD FRML MDRD: 101 ML/MIN/1.73SQ M
GLUCOSE SERPL-MCNC: 105 MG/DL (ref 65–140)
GLUCOSE UR STRIP-MCNC: NEGATIVE MG/DL
HCG SERPL QL: NEGATIVE
HCT VFR BLD AUTO: 44.2 % (ref 34.8–46.1)
HGB BLD-MCNC: 14.7 G/DL (ref 11.5–15.4)
HGB UR QL STRIP.AUTO: NEGATIVE
INR PPP: 0.95 (ref 0.84–1.19)
KETONES UR STRIP-MCNC: ABNORMAL MG/DL
LEUKOCYTE ESTERASE UR QL STRIP: NEGATIVE
MCH RBC QN AUTO: 29.7 PG (ref 26.8–34.3)
MCHC RBC AUTO-ENTMCNC: 33.3 G/DL (ref 31.4–37.4)
MCV RBC AUTO: 89 FL (ref 82–98)
NITRITE UR QL STRIP: NEGATIVE
NON-SQ EPI CELLS URNS QL MICRO: NORMAL /HPF
P AXIS: 60 DEGREES
P AXIS: 69 DEGREES
P AXIS: 87 DEGREES
PH UR STRIP.AUTO: 7 [PH]
PLATELET # BLD AUTO: 367 THOUSANDS/UL (ref 149–390)
PMV BLD AUTO: 9.9 FL (ref 8.9–12.7)
POTASSIUM SERPL-SCNC: 4.2 MMOL/L (ref 3.5–5.3)
PR INTERVAL: 144 MS
PR INTERVAL: 148 MS
PR INTERVAL: 152 MS
PROCALCITONIN SERPL-MCNC: <0.05 NG/ML
PROT UR STRIP-MCNC: ABNORMAL MG/DL
PROTHROMBIN TIME: 13.1 SECONDS (ref 11.6–14.5)
QRS AXIS: 79 DEGREES
QRS AXIS: 80 DEGREES
QRS AXIS: 87 DEGREES
QRSD INTERVAL: 72 MS
QRSD INTERVAL: 76 MS
QRSD INTERVAL: 78 MS
QT INTERVAL: 366 MS
QT INTERVAL: 372 MS
QT INTERVAL: 388 MS
QTC INTERVAL: 437 MS
QTC INTERVAL: 442 MS
QTC INTERVAL: 455 MS
RBC # BLD AUTO: 4.95 MILLION/UL (ref 3.81–5.12)
RBC #/AREA URNS AUTO: NORMAL /HPF
RSV RNA RESP QL NAA+PROBE: NEGATIVE
SARS-COV-2 RNA RESP QL NAA+PROBE: NEGATIVE
SODIUM SERPL-SCNC: 134 MMOL/L (ref 135–147)
SP GR UR STRIP.AUTO: 1.01 (ref 1–1.03)
T WAVE AXIS: 14 DEGREES
T WAVE AXIS: 14 DEGREES
T WAVE AXIS: 24 DEGREES
UROBILINOGEN UR STRIP-ACNC: <2 MG/DL
VENTRICULAR RATE: 78 BPM
VENTRICULAR RATE: 86 BPM
VENTRICULAR RATE: 90 BPM
WBC # BLD AUTO: 15.59 THOUSAND/UL (ref 4.31–10.16)
WBC #/AREA URNS AUTO: NORMAL /HPF

## 2024-01-10 PROCEDURE — 87040 BLOOD CULTURE FOR BACTERIA: CPT | Performed by: INTERNAL MEDICINE

## 2024-01-10 PROCEDURE — 70498 CT ANGIOGRAPHY NECK: CPT

## 2024-01-10 PROCEDURE — 0241U HB NFCT DS VIR RESP RNA 4 TRGT: CPT | Performed by: EMERGENCY MEDICINE

## 2024-01-10 PROCEDURE — 84145 PROCALCITONIN (PCT): CPT | Performed by: INTERNAL MEDICINE

## 2024-01-10 PROCEDURE — 85027 COMPLETE CBC AUTOMATED: CPT | Performed by: EMERGENCY MEDICINE

## 2024-01-10 PROCEDURE — 84484 ASSAY OF TROPONIN QUANT: CPT | Performed by: EMERGENCY MEDICINE

## 2024-01-10 PROCEDURE — 85730 THROMBOPLASTIN TIME PARTIAL: CPT | Performed by: EMERGENCY MEDICINE

## 2024-01-10 PROCEDURE — 80048 BASIC METABOLIC PNL TOTAL CA: CPT | Performed by: EMERGENCY MEDICINE

## 2024-01-10 PROCEDURE — 99285 EMERGENCY DEPT VISIT HI MDM: CPT | Performed by: EMERGENCY MEDICINE

## 2024-01-10 PROCEDURE — 83036 HEMOGLOBIN GLYCOSYLATED A1C: CPT | Performed by: INTERNAL MEDICINE

## 2024-01-10 PROCEDURE — 99284 EMERGENCY DEPT VISIT MOD MDM: CPT

## 2024-01-10 PROCEDURE — 71045 X-RAY EXAM CHEST 1 VIEW: CPT

## 2024-01-10 PROCEDURE — 84703 CHORIONIC GONADOTROPIN ASSAY: CPT | Performed by: EMERGENCY MEDICINE

## 2024-01-10 PROCEDURE — 85610 PROTHROMBIN TIME: CPT | Performed by: EMERGENCY MEDICINE

## 2024-01-10 PROCEDURE — 36415 COLL VENOUS BLD VENIPUNCTURE: CPT | Performed by: EMERGENCY MEDICINE

## 2024-01-10 PROCEDURE — 93005 ELECTROCARDIOGRAM TRACING: CPT

## 2024-01-10 PROCEDURE — 81001 URINALYSIS AUTO W/SCOPE: CPT | Performed by: INTERNAL MEDICINE

## 2024-01-10 PROCEDURE — NC001 PR NO CHARGE: Performed by: STUDENT IN AN ORGANIZED HEALTH CARE EDUCATION/TRAINING PROGRAM

## 2024-01-10 PROCEDURE — 70496 CT ANGIOGRAPHY HEAD: CPT

## 2024-01-10 PROCEDURE — 99245 OFF/OP CONSLTJ NEW/EST HI 55: CPT | Performed by: STUDENT IN AN ORGANIZED HEALTH CARE EDUCATION/TRAINING PROGRAM

## 2024-01-10 PROCEDURE — 99223 1ST HOSP IP/OBS HIGH 75: CPT | Performed by: INTERNAL MEDICINE

## 2024-01-10 RX ORDER — ATORVASTATIN CALCIUM 40 MG/1
40 TABLET, FILM COATED ORAL EVERY EVENING
Status: DISCONTINUED | OUTPATIENT
Start: 2024-01-10 | End: 2024-01-12

## 2024-01-10 RX ORDER — ASPIRIN 81 MG/1
81 TABLET, CHEWABLE ORAL DAILY
Status: DISCONTINUED | OUTPATIENT
Start: 2024-01-11 | End: 2024-01-12

## 2024-01-10 RX ORDER — ASPIRIN 325 MG
325 TABLET ORAL ONCE
Status: COMPLETED | OUTPATIENT
Start: 2024-01-10 | End: 2024-01-10

## 2024-01-10 RX ADMIN — ATORVASTATIN CALCIUM 40 MG: 40 TABLET, FILM COATED ORAL at 17:52

## 2024-01-10 RX ADMIN — ASPIRIN 325 MG ORAL TABLET 325 MG: 325 PILL ORAL at 14:32

## 2024-01-10 NOTE — ED PROVIDER NOTES
79 y.o.    Patient Care Team:  Napoleon Mead MD as PCP - General  Napoleon Mead MD as PCP - Family Medicine    Chief Complaint:    FOLLOW UP/TYPE 2 DIABETES MELLITUS   Subjective     HPI    79-year old white female is here for the follow-up of type 2 diabetes mellitus, hypothyroidism.     Type 2 diabetes mellitus-diagnosed about 10 years ago, if not more.  Today in the clinic patient reports that she is on Toujeo 58 units at bedtime, Humalog sliding scale only, metformin thousand milligrams oral once a day and Victoza 1.8 mg subcutaneous daily.  She continues to check her blood sugars 2 times a day.  Average blood sugars are around .  She got her glucometer for me to review.  Due for eye examination.  Worsening symptoms of diabetic neuropathy on the Cymbalta.  History of CAD, CKD, CVA.  On ARB     Hyperlipidemia  On Lipitor 80 mg oral daily.     Hypothyroidism on levothyroxine 100 mcg oral daily.    Reviewed primary care physician's/consulting physician documentation and lab results :     Interval History      The following portions of the patient's history were reviewed and updated as appropriate: allergies, current medications, past family history, past medical history, past social history, past surgical history and problem list.    Past Medical History:   Diagnosis Date   • Anxiety    • Arthritis    • Benign essential hypertension 08/20/2014   • Bleeding disorder (CMS/[facility])    • Depression    • Diabetes (CMS/[facility])    • Diabetes mellitus, type 2 (CMS/HCC)    • Disc degeneration, lumbar    • Headache, tension-type    • Hyperlipidemia    • Hypothyroidism    • Neuropathy    • Osteoporosis 09/09/2015   • Peripheral neuropathy    • Rotator cuff tear, left    • Scoliosis    • Shoulder pain     LEFT, TORN ROTATOR CUFF S/P FALL   • Spinal stenosis      Family History   Problem Relation Age of Onset   • Bone cancer Mother    • No Known Problems Father      Social History     Socioeconomic History   • Marital  History  Chief Complaint   Patient presents with    Medical Problem     Pt reports numbness and tingling on left side of face and left arm today and Monday, pt reports dizziness and that her eye is shaking      Patient is a 38 year old female with a past medical history significant for HTN who presents with left sided numbness/tingling. Patient reports that on Monday for a few minutes she had left arm tingling that self resolved. Then today at 11am, she developed tingling/ numbness of the left side of her body including her face, arm, leg that has been slowly improving, but still present as a tingling sensation.  States that she feels like her left eyelid is a bit twitchy. Denies headache, changes in vision, weakness, neck pain or stiffness, fevers, chills.        Prior to Admission Medications   Prescriptions Last Dose Informant Patient Reported? Taking?   losartan-hydrochlorothiazide (HYZAAR) 100-25 MG per tablet  Self No No   Sig: Take 1 tablet by mouth daily   rosuvastatin (CRESTOR) 10 MG tablet  Self No No   Sig: Take 1 tablet (10 mg total) by mouth daily      Facility-Administered Medications: None       Past Medical History:   Diagnosis Date    Hypertension        Past Surgical History:   Procedure Laterality Date     SECTION         Family History   Problem Relation Age of Onset    Hypertension Mother     Diabetes Mother     Hypertension Father     Diabetes Father     Colon polyps Neg Hx     Colon cancer Neg Hx      I have reviewed and agree with the history as documented.    E-Cigarette/Vaping    E-Cigarette Use Never User      E-Cigarette/Vaping Substances    Nicotine No     THC No     CBD No     Flavoring No     Other No     Unknown No      Social History     Tobacco Use    Smoking status: Former    Smokeless tobacco: Never   Vaping Use    Vaping status: Never Used   Substance Use Topics    Alcohol use: Never     Comment: Social     Drug use: Never       Review of Systems   Constitutional:   Negative for chills and fever.   Eyes:  Negative for photophobia, pain and visual disturbance.   Respiratory:  Negative for shortness of breath.    Cardiovascular:  Negative for chest pain.   Gastrointestinal:  Negative for nausea and vomiting.   Musculoskeletal:  Negative for back pain, neck pain and neck stiffness.   Neurological:  Positive for numbness. Negative for dizziness, tremors, seizures, syncope, speech difficulty, weakness, light-headedness and headaches.   Psychiatric/Behavioral:  Negative for confusion.        Physical Exam  Physical Exam  Vitals and nursing note reviewed.   Constitutional:       General: She is not in acute distress.     Appearance: Normal appearance. She is not ill-appearing, toxic-appearing or diaphoretic.   HENT:      Head: Normocephalic and atraumatic.      Mouth/Throat:      Mouth: Mucous membranes are moist.   Eyes:      General: No visual field deficit.     Conjunctiva/sclera: Conjunctivae normal.      Pupils: Pupils are equal, round, and reactive to light.   Cardiovascular:      Rate and Rhythm: Normal rate and regular rhythm.      Pulses: Normal pulses.      Heart sounds: Normal heart sounds. No murmur heard.  Pulmonary:      Effort: Pulmonary effort is normal. No respiratory distress.      Breath sounds: Normal breath sounds. No stridor. No wheezing, rhonchi or rales.   Chest:      Chest wall: No tenderness.   Abdominal:      General: Bowel sounds are normal. There is no distension.      Palpations: Abdomen is soft.      Tenderness: There is no abdominal tenderness. There is no guarding or rebound.   Musculoskeletal:      Cervical back: Neck supple.   Skin:     General: Skin is warm and dry.   Neurological:      General: No focal deficit present.      Mental Status: She is alert and oriented to person, place, and time. Mental status is at baseline.      Cranial Nerves: Cranial nerves 2-12 are intact. No cranial nerve deficit, dysarthria or facial asymmetry.      Sensory:  status:      Spouse name: Not on file   • Number of children: Not on file   • Years of education: Not on file   • Highest education level: Not on file   Tobacco Use   • Smoking status: Former Smoker     Packs/day: 1.00     Last attempt to quit: 2013     Years since quittin.0   • Smokeless tobacco: Never Used   Substance and Sexual Activity   • Alcohol use: No   • Drug use: No   • Sexual activity: Defer     Allergies   Allergen Reactions   • Sulfa Antibiotics        Current Outpatient Medications:   •  atorvastatin (LIPITOR) 80 MG tablet, Take 1 tablet by mouth Every Night., Disp: 30 tablet, Rfl: 11  •  bisoprolol (ZEBeta) 5 MG tablet, Take 1 tablet by mouth Daily., Disp: 90 tablet, Rfl: 1  •  hydrochlorothiazide (HYDRODIURIL) 12.5 MG tablet, Take 1 tablet by mouth Daily., Disp: 90 tablet, Rfl: 1  •  Insulin Glargine (TOUJEO SOLOSTAR) 300 UNIT/ML solution pen-injector, Inject 60 Units under the skin into the appropriate area as directed every night at bedtime., Disp: 20 pen, Rfl: 3  •  Insulin Lispro (HUMALOG KWIKPEN) 100 UNIT/ML solution pen-injector, Inject 6 Units under the skin into the appropriate area as directed 4 (Three) Times a Day With Meals. Breakfast lunch dinner and snack, Disp: 10 pen, Rfl: 3  •  Insulin Pen Needle (RELION PEN NEEDLES) 32G X 4 MM misc, USE TO INJECT INSULIN 4 TIMES A DAY, Disp: 400 each, Rfl: 0  •  Insulin Pen Needle 32G X 4 MM misc, Use to inject insulin 4 TIMES DAILY, Disp: 200 each, Rfl: 0  •  ketoconazole (NIZORAL) 2 % cream, , Disp: , Rfl:   •  lansoprazole (PREVACID) 30 MG capsule, Take 30 mg by mouth Daily., Disp: , Rfl:   •  levothyroxine (SYNTHROID, LEVOTHROID) 100 MCG tablet, Take 1 tablet by mouth Daily., Disp: 30 tablet, Rfl: 11  •  metFORMIN (GLUCOPHAGE) 1000 MG tablet, Take 1 tablet by mouth Daily With Breakfast., Disp: 180 tablet, Rfl: 0  •  VICTOZA 18 MG/3ML solution pen-injector injection, INJECT 1.8 MG UNDER THE SKIN INTO THE APPROPRIATE AREA AS DIRECTED  DAILY, Disp: 12 mL, Rfl: 9  •  pregabalin (LYRICA) 50 MG capsule, Take 1 capsule by mouth 3 (Three) Times a Day for 90 days., Disp: 90 capsule, Rfl: 2  •  SHINGRIX 50 MCG/0.5ML reconstituted suspension, ADMINISTER VACCINE PER PHYSICIAN PROTOCOL, Disp: , Rfl: 1        Review of Systems   Constitutional: Negative for appetite change, fatigue and fever.   Eyes: Negative for visual disturbance.   Respiratory: Negative for shortness of breath.    Cardiovascular: Negative for palpitations and leg swelling.   Gastrointestinal: Negative for abdominal pain and vomiting.   Endocrine: Negative for polydipsia and polyuria.   Musculoskeletal: Positive for back pain and gait problem. Negative for joint swelling and neck pain.   Skin: Negative for rash.   Neurological: Negative for weakness and numbness.   Psychiatric/Behavioral: Negative for behavioral problems.     I have reviewed the ROS as documented by the MA; Solomon Barrow MD.      Objective       There were no vitals filed for this visit.  There is no height or weight on file to calculate BMI.      Physical Exam   Constitutional: She is oriented to person, place, and time. She appears well-nourished.   Obese     HENT:   Head: Normocephalic and atraumatic.   Wide neck   Eyes: Conjunctivae and EOM are normal. No scleral icterus.   Neck: Normal range of motion. Neck supple. No thyromegaly present.   Acanthosis nigricans   Cardiovascular: Normal rate and normal heart sounds.   Pulmonary/Chest: Effort normal and breath sounds normal. No stridor. She has no wheezes.   Abdominal: Soft. Bowel sounds are normal. She exhibits no distension. There is no tenderness.   Central obesity   Musculoskeletal: She exhibits edema, tenderness and deformity.   Neurological: She is alert and oriented to person, place, and time.   Skin: Skin is warm and dry. She is not diaphoretic.   Psychiatric: She has a normal mood and affect.   Vitals reviewed.    Results Review:     I reviewed the patient's  Sensory deficit (subjective tingling with decreased sensation on the left face, LUE and LLE compared to the right. objectively, can distinguish sharp v. dull) present.      Motor: Motor function is intact. No weakness, tremor, abnormal muscle tone, seizure activity or pronator drift.      Coordination: Coordination is intact. Finger-Nose-Finger Test normal.      Gait: Gait is intact.   Psychiatric:         Mood and Affect: Mood normal.         Behavior: Behavior normal.         Vital Signs  ED Triage Vitals [01/10/24 1240]   Temperature Pulse Respirations Blood Pressure SpO2   97.7 °F (36.5 °C) 89 18 (!) 148/107 97 %      Temp Source Heart Rate Source Patient Position - Orthostatic VS BP Location FiO2 (%)   Temporal Monitor Sitting Left arm --      Pain Score       --           Vitals:    01/10/24 1300 01/10/24 1315 01/10/24 1330 01/10/24 1347   BP: 140/75 160/70 167/77    Pulse: 103 92 82 86   Patient Position - Orthostatic VS:             Visual Acuity  Visual Acuity      Flowsheet Row Most Recent Value   L Pupil Size (mm) 3   R Pupil Size (mm) 3            ED Medications  Medications   aspirin tablet 325 mg (has no administration in time range)       Diagnostic Studies  Results Reviewed       Procedure Component Value Units Date/Time    HS Troponin 0hr (reflex protocol) [[id number]]  (Normal) Collected: 01/10/24 1253    Lab Status: Final result Specimen: Blood from Arm, Right Updated: 01/10/24 1354     hs TnI 0hr <2 ng/L     HS Troponin I 2hr [[id number]]     Lab Status: No result Specimen: Blood     Basic metabolic panel [[id number]] Collected: 01/10/24 1346    Lab Status: In process Specimen: Blood from Arm, Right Updated: 01/10/24 1347    Protime-INR [[id number]] Collected: 01/10/24 1346    Lab Status: In process Specimen: Blood from Arm, Right Updated: 01/10/24 1347    APTT [[id number]] Collected: 01/10/24 1346    Lab Status: In process Specimen: Blood from Arm, Right Updated: 01/10/24 1347    CBC and Platelet  [[id number]]  (Abnormal) Collected: 01/10/24 1253    Lab Status: Final result Specimen: Blood from Arm, Right Updated: 01/10/24 1346     WBC 15.59 Thousand/uL      RBC 4.95 Million/uL      Hemoglobin 14.7 g/dL      Hematocrit 44.2 %      MCV 89 fL      MCH 29.7 pg      MCHC 33.3 g/dL      RDW 12.4 %      Platelets 367 Thousands/uL      MPV 9.9 fL     FLU/RSV/COVID - if FLU/RSV clinically relevant [[id number]] Collected: 01/10/24 1253    Lab Status: In process Specimen: Nares from Nasopharyngeal Swab Updated: 01/10/24 1303                   CT stroke alert brain   Final Result by Neville Gordillo MD (01/10 1339)      -No acute intracranial abnormality.      Findings were directly discussed with Robel Guillory at 1:35 p.m. on 1/10/2024.         Workstation performed: [id number]         CTA stroke alert (head/neck)   Final Result by Neville Gordillo MD (01/10 1336)      CTA head:   -No large vessel occlusion, high-grade stenosis or aneurysm.      CTA neck:   -No high-grade stenosis, dissection or aneurysm.      Findings were directly discussed with Robel Guillory at 1:35 p.m. on 1/10/2024.                           Workstation performed: [id number]         X-ray chest 1 view portable   ED Interpretation by Chasidy Mares DO (01/10 1346)   No acute abnormalities as interpreted by me independently       Final Result by Micheal Roy MD (01/10 1356)      No acute cardiopulmonary disease.                  Workstation performed: [id number]                    Procedures  ECG 12 Lead Documentation Only    Date/Time: 1/10/2024 1:58 PM    Performed by: Chasidy Mares DO  Authorized by: Chasidy Mares DO    Indications / Diagnosis:  Stroke alert  ECG reviewed by me, the ED Provider: yes    Patient location:  ED  Previous ECG:     Previous ECG:  Unavailable  Interpretation:     Interpretation: normal    Rate:     ECG rate:  89    ECG rate assessment: normal    Rhythm:     Rhythm: sinus rhythm    Ectopy:     Ectopy: none    QRS:  new clinical results and mentioned them above in HPI and in plan as well.    Medical records reviewed  Summary:Done      Office Visit on 08/16/2019   Component Date Value Ref Range Status   • Hemoglobin A1C 08/16/2019 6.00* 4.80 - 5.60 % Final    Comment: Hemoglobin A1C Ranges:  Increased Risk for Diabetes  5.7% to 6.4%  Diabetes                     >= 6.5%  Diabetic Goal                < 7.0%     • Glucose 08/16/2019 130* 65 - 99 mg/dL Final   • BUN 08/16/2019 22  8 - 23 mg/dL Final   • Creatinine 08/16/2019 0.88  0.57 - 1.00 mg/dL Final   • eGFR Non  Am 08/16/2019 62  >60 mL/min/1.73 Final    Comment: The MDRD GFR formula is only valid for adults with stable  renal function between ages 18 and 70.     • eGFR  Am 08/16/2019 75  >60 mL/min/1.73 Final   • BUN/Creatinine Ratio 08/16/2019 25.0  7.0 - 25.0 Final   • Sodium 08/16/2019 139  136 - 145 mmol/L Final   • Potassium 08/16/2019 3.9  3.5 - 5.2 mmol/L Final   • Chloride 08/16/2019 100  98 - 107 mmol/L Final   • Total CO2 08/16/2019 24.1  22.0 - 29.0 mmol/L Final   • Calcium 08/16/2019 9.6  8.6 - 10.5 mg/dL Final   • TSH 08/16/2019 0.041* 0.270 - 4.200 mIU/mL Final   • Free T4 08/16/2019 1.85* 0.93 - 1.70 ng/dL Final   • Vitamin B-12 08/16/2019 830  211 - 946 pg/mL Final   • Folate 08/16/2019 >20.00  4.78 - 24.20 ng/mL Final     Lab Results   Component Value Date    HGBA1C 6.00 (H) 08/16/2019    HGBA1C 5.90 (H) 05/15/2019    HGBA1C 8.1 (H) 02/14/2019     Lab Results   Component Value Date    MICROALBUR 15.0 05/21/2019    CREATININE 0.88 08/16/2019     Imaging Results (Most Recent)     None                Assessment and Plan:    Diagnoses and all orders for this visit:    Type 2 diabetes mellitus with other circulatory complication, with long-term current use of insulin (CMS/[facility])  -     Basic Metabolic Panel  -     Hemoglobin A1c  -     Lipid Panel  -     Microalbumin / Creatinine Urine Ratio - Urine, Clean Catch  -     TSH  -     T4, Free  -          QRS axis:  Normal    QRS intervals:  Normal  Conduction:     Conduction: normal    ST segments:     ST segments:  Normal  T waves:     T waves: normal    Comments:      Qtc [id number]           ED Course  ED Course as of 01/10/24 1400   Wed Charlie 10, 2024   1250 Stroke alert called   1326 Per neurology, not TNK candidate due to low NIH, will need to admit on stroke pathway, load with aspirin, needs MRI.                   Stroke Assessment       Row Name 01/10/24 1251             NIH Stroke Scale    Interval Baseline      Level of Consciousness (1a.) 0      LOC Questions (1b.) 0      LOC Commands (1c.) 0      Best Gaze (2.) 0      Visual (3.) 0      Facial Palsy (4.) 0      Motor Arm, Left (5a.) 0      Motor Arm, Right (5b.) 0      Motor Leg, Left (6a.) 0      Motor Leg, Right (6b.) 0      Limb Ataxia (7.) 0      Sensory (8.) 1      Best Language (9.) 0      Dysarthria (10.) 0      Extinction and Inattention (11.) (Formerly Neglect) 0      Total 1                    Flowsheet Row Most Recent Value   Thrombolytic Decision Options    Thrombolytic Decision Patient not a candidate.   Patient is not a candidate options Symptoms resolved/clearly non disabling.                      SBIRT 22yo+      Flowsheet Row Most Recent Value   Initial Alcohol Screen: US AUDIT-C     1. How often do you have a drink containing alcohol? 0 Filed at: 01/10/2024 1242   2. How many drinks containing alcohol do you have on a typical day you are drinking?  0 Filed at: 01/10/2024 1242   3a. Male UNDER 65: How often do you have five or more drinks on one occasion? 0 Filed at: 01/10/2024 1242   3b. FEMALE Any Age, or MALE 65+: How often do you have 4 or more drinks on one occassion? 0 Filed at: 01/10/2024 1242   Audit-C Score 0 Filed at: 01/10/2024 1242                      Medical Decision Making  Assessment and plan:  Differential includes stroke versus TIA versus new onset MS or other neurological dysfunction.  Stroke alert.  NIH 1.  Not TNK  "pregabalin (LYRICA) 50 MG capsule; Take 1 capsule by mouth 3 (Three) Times a Day for 90 days.    Primary hypothyroidism  -     Basic Metabolic Panel  -     Hemoglobin A1c  -     Lipid Panel  -     Microalbumin / Creatinine Urine Ratio - Urine, Clean Catch  -     TSH  -     T4, Free    Mixed hyperlipidemia  -     Basic Metabolic Panel  -     Hemoglobin A1c  -     Lipid Panel  -     Microalbumin / Creatinine Urine Ratio - Urine, Clean Catch  -     TSH  -     T4, Free          Reviewed Lab results with the patient.             Solomon Barrow MD  01/16/20    EMR Dragon / transcription disclaimer:     \"Dictated utilizing Dragon dictation\".  " candidate.    Amount and/or Complexity of Data Reviewed  Labs: ordered.  Radiology: ordered and independent interpretation performed.    Risk  OTC drugs.  Decision regarding hospitalization.             Disposition  Final diagnoses:   Numbness and tingling     Time reflects when diagnosis was documented in both MDM as applicable and the Disposition within this note       Time User Action Codes Description Comment    1/10/2024 12:52 PM Chasidy Mares Add [R20.0,  R20.2] Numbness and tingling           ED Disposition       ED Disposition   Admit    Condition   Stable    Date/Time   Wed Charlie 10, 2024 1308    Comment   Case was discussed with hospitalist and the patient's admission status was agreed to be Admission Status: observation status to the service of Dr. Tracey .               Follow-up Information    None         Patient's Medications   Discharge Prescriptions    No medications on file       No discharge procedures on file.    PDMP Review       None            ED Provider  Electronically Signed by             Chasidy Mares DO  01/10/24 1400

## 2024-01-10 NOTE — TELEPHONE ENCOUNTER
Patients  called to see if EKG machine was working downstairs- I checked and it is working.   He said that there was an order placed by you yesterday- I did not see it in her chart-  Please advise on how to direct the patient  Thank you

## 2024-01-10 NOTE — ASSESSMENT & PLAN NOTE
Meets criteria with tachypnea, leukocytosis  No suspicion for infection  RSV, covid, flu negative  Check Pro-Deon  order blood cultures, Urine reflex studies

## 2024-01-10 NOTE — ASSESSMENT & PLAN NOTE
Presenting with left arm, leg, left side of the face tingling and numbness  CT head-no acute intracranial abnormality  CTA head and neck-no occlusion, stenosis, dissection, aneurysm  Stroke pathway  MRI brain  Echo  Lipid Panel  Hemoglobin A1c  Aspirin 81 mg   Atorvastatin 40 mg  Permissive HTN, labetalol if SBP >200  Continue telemetry  PT/OT/ST  Frequent neuro checks.

## 2024-01-10 NOTE — CONSULTS
Consultation - Stroke   Emily Teague 38 y.o. female MRN: [id number]  Unit/Bed#: ED 02 Encounter: [id number]      Assessment/Plan   Stroke-like symptoms  Assessment & Plan  The patient is a 38 year old female with hx of hypertension, no prior neurological hx, whom presents to [facility] ED with chief complaint of left face, arm, leg numbness and tingling (patchy), starting around 11 a.m. today.  She reports she had a transient few minutes episode of numbness of her left arm on Monday, which resolved.  She never had symptoms like this before.    NIH was a 1.     CT of head- No acute intracranial abnormality.   CTA of head and neck - CTA head:. No large vessel occlusion, high-grade stenosis or aneurysm.No high-grade stenosis, dissection or aneurysm.    The patient is not a tnk candidate as her symptoms are mild and improving.   No lvo seen on imaging, the patient is not a thrombectomy candidate.     - Stroke pathway, admit to medicine team  MRI brain with and with out contrast, MRI of cervical spine with and with out contrast.   Echo  Lipid Panel  Hemoglobin A1c  Aspirin 81 mg daily starting 1/11/23, Loaded with Aspirin in the ED. (Attending reviewed with ED provider)  Atorvastatin 40 mg  Continue telemetry  PT/OT/ST  Frequent neuro checks. Continue to monitor and notify neurology with any changes.   - Medical management and supportive care per primary team. Correction of any metabolic or infectious disturbances.    -Reviewed case with attending, plan of care per attending physician.       Recommendations for outpatient neurological follow up have yet to be determined.    History of Present Illness   Reason for Consult / Principal Problem:   Stroke Alert  Patient last known well: 11 a.m. today  Stroke alert called: 12:51   Neurology time of arrival: Immediate by phone, 3 minutes in person.   HPI: Emily Teague is a 38 y.o. with hx of HTN, no prior neurological encounters in chart review.   Patient reports to the [facility] ED with chief complaint of numbness and tingling of the left face and arm, as well as dizziness.  A stroke alert was activated, blood pressure on arrival was 148/107 mmhg.  She does not take antiplatelet or anticoagulation medication at home.    The patient primary language is Syriac but does speak English.  She denies any prior neurological hx, she denies every having symptoms like this in the past.  She reports on Monday she had transient numbness of her left arm lasting only a few minutes and resolving.  She reports she works in , at 11 a.m. all of sudden she developed numbness/tingling of her left face, arm and leg.  She reports with her hx of high blood pressure she was concerned.   She came to the ED for evaluation.  A stroke alert was activated, initial NIH was a 1 in the ED.  Her symptoms seems to be improving, her numbness is only affecting her face now.  She denies ha, problems with vision, speech, problems with weakness or coordination, she denies dizziness or problems with her gait.  No neck pain, no headache.   She reports she has been under a lot of stress recentlty.     VS the patient is afebrile, pulse is 89, respiration is 18, blood pressure was 148/107 mmhg on arrival.   CT of head-  no acute intracranial abnormality.   CTA of head and neck completed-  No lvo seen.     Neurology NIH was a zero.     The patient is not a tnk candidate with low NIH and symptoms seem to be improving.   CTA with out LVO, the patient was not a thrombectomy candidate.     Reviewed with attending, plan is to admit on stroke pathway, as well as get an MRI of the brain and cervical spine with and with out contrast, rule out demyelinating process.     Inpatient consult to Neurology  Consult performed by: Tip Staton PA-C  Consult ordered by: Chasidy Mares,         Review of Systems  12 point ROS as per HPI, otherwise negative.     Historical Information   Past Medical History:    Diagnosis Date    Hypertension      Past Surgical History:   Procedure Laterality Date     SECTION       Social History   Social History     Substance and Sexual Activity   Alcohol Use Never    Comment: Social      Social History     Substance and Sexual Activity   Drug Use Never     E-Cigarette/Vaping    E-Cigarette Use Never User      E-Cigarette/Vaping Substances    Nicotine No     THC No     CBD No     Flavoring No     Other No     Unknown No      Social History     Tobacco Use   Smoking Status Former   Smokeless Tobacco Never     Family History:   Family History   Problem Relation Age of Onset    Hypertension Mother     Diabetes Mother     Hypertension Father     Diabetes Father     Colon polyps Neg Hx     Colon cancer Neg Hx      Meds/Allergies   all current active meds have been reviewed, current meds:   Current Facility-Administered Medications   Medication Dose Route Frequency    aspirin tablet 325 mg  325 mg Oral Once   , and PTA meds:   Prior to Admission Medications   Prescriptions Last Dose Informant Patient Reported? Taking?   losartan-hydrochlorothiazide (HYZAAR) 100-25 MG per tablet  Self No No   Sig: Take 1 tablet by mouth daily   rosuvastatin (CRESTOR) 10 MG tablet  Self No No   Sig: Take 1 tablet (10 mg total) by mouth daily      Facility-Administered Medications: None     Allergies   Allergen Reactions    Amoxicillin Rash     Objective   Vitals:Blood pressure 167/77, pulse 86, temperature 97.7 °F (36.5 °C), temperature source Temporal, resp. rate (!) 23, last menstrual period 2023, SpO2 100%, not currently breastfeeding.,There is no height or weight on file to calculate BMI.  No intake or output data in the 24 hours ending 01/10/24 1355    Invasive Devices:   Invasive Devices       Peripheral Intravenous Line  Duration             Peripheral IV 01/10/24 Right Antecubital <1 day                    Physical Exam  Vitals reviewed.   Constitutional:       General: She is not in acute  distress.     Appearance: Normal appearance. She is not ill-appearing, toxic-appearing or diaphoretic.   HENT:      Head: Normocephalic and atraumatic.   Eyes:      General:         Right eye: No discharge.         Left eye: No discharge.      Extraocular Movements: Extraocular movements intact.      Pupils: Pupils are equal, round, and reactive to light.   Cardiovascular:      Rate and Rhythm: Normal rate.   Pulmonary:      Effort: No respiratory distress.   Abdominal:      General: There is no distension.   Musculoskeletal:         General: No swelling, tenderness, deformity or signs of injury. Normal range of motion.      Cervical back: Normal range of motion and neck supple.   Skin:     General: Skin is warm and dry.   Neurological:      Mental Status: She is alert and oriented to person, place, and time.      Cranial Nerves: Cranial nerves 2-12 are intact.      Motor: Motor strength is normal.     Coordination: Finger-Nose-Finger Test and Heel to Shin Test normal.      Gait: Gait is intact.      Deep Tendon Reflexes:      Reflex Scores:       Tricep reflexes are 2+ on the right side and 2+ on the left side.       Bicep reflexes are 2+ on the right side and 2+ on the left side.       Brachioradialis reflexes are 2+ on the right side.       Patellar reflexes are 2+ on the right side and 2+ on the left side.       Achilles reflexes are 2+ on the right side and 2+ on the left side.  Psychiatric:         Speech: Speech normal.       Neurologic Exam     Mental Status   Oriented to person, place, and time.   Follows 2 step commands.   Attention: normal. Concentration: normal.   Speech: speech is normal   Level of consciousness: alert  Knowledge: good. Able to perform simple calculations.   Able to name object. Able to read. Able to repeat. Able to write. Normal comprehension.   She is able to read, repeat, recognize objects.   She is able to provide hx with good insight.   She is cooperative and pleasant, with normal  attn and concentration.      Cranial Nerves   Cranial nerves II through XII intact.     CN III, IV, VI   Pupils are equal, round, and reactive to light.  Except decrease to pp left face.     Motor Exam   Muscle bulk: normal  Overall muscle tone: normal  Right arm pronator drift: absent  Left arm pronator drift: absent    Strength   Strength 5/5 throughout.     Sensory Exam   Decreased pp to left arm and leg.      Gait, Coordination, and Reflexes     Gait  Gait: normal    Coordination   Finger to nose coordination: normal  Heel to shin coordination: normal    Tremor   Resting tremor: absent  Intention tremor: absent    Reflexes   Right brachioradialis: 2+  Right biceps: 2+  Left biceps: 2+  Right triceps: 2+  Left triceps: 2+  Right patellar: 2+  Left patellar: 2+  Right achilles: 2+  Left achilles: 2+  Right : 3+  Right plantar: normal  Left plantar: normal  Right ankle clonus: present (non sustained)  Left ankle clonus: present (non sustained)No seizure activity observed.        NIHSS:  1a.Level of Consciousness: 0 = Alert   1b. LOC Questions: 0 = Answers both correctly   1c. LOC Commands: 0 = Obeys both correctly   2. Best Gaze: 0 = Normal   3. Visual: 0 = No visual field loss   4. Facial Palsy: 0=Normal symmetric movement   5a. Motor Right Arm: 0=No drift, limb holds 90 (or 45) degrees for full 10 seconds   5b. Motor Left Arm: 0=No drift, limb holds 90 (or 45) degrees for full 10 seconds   6a. Motor Right Le=No drift, limb holds 90 (or 45) degrees for full 10 seconds   6b. Motor Left Le=No drift, limb holds 90 (or 45) degrees for full 10 seconds   7. Limb Ataxia:  0=Absent   8. Sensory: 1=Mild to moderate sensory loss; patient feels pinprick is less sharp or is dull on the affected side; there is a loss of superficial pain with pinprick but patient is aware She is being touched   9. Best Language:  0=No aphasia, normal   10. Dysarthria: 0=Normal articulation   11. Extinction and Inattention (formerly  Neglect): 0=No abnormality   Total Score: 1     Time NIHSS was completed: at time of stroke alert    Labs Results: I have personally reviewed pertinent reports.    Imaging Studies: I have personally reviewed pertinent reports.    Imaging reviewed by attending.   Code Status: No Order    Counseling / Coordination of Care  Reviewed case with neurology attending, history and physical examination, labs and imaging completed, plan of care as per attending physician.    Please see attestation for further details.  Examined alongside attending physician.

## 2024-01-10 NOTE — OCCUPATIONAL THERAPY NOTE
Occupational Therapy Screen    Patient Name: Emily Teague  Today's Date: 1/10/2024     01/10/24 1528   OT Last Visit   OT Visit Date 01/10/24   Note Type   Note type Screen   Additional Comments OT orders received and chart reviewed. Per chart, pt is independent and works. Spoke to pt who states she  currently has no concerns about her ADLs/functional mobility. States she has been in and out of the bathroom in the hospital without difficulty and has no concerns about her ability to return home when medically cleared. Her only residual symptom is facial numbness which is not impairing her functional status. Recommend pt continue to be OOB for meals, ambulation to/from BR, perform self care tasks, and mobility in hallway with nursing.  At this time, OT recommendations at time of discharge are return home at [facility]. No acute OT needs identified at this time; please re-consult if OT needs arise during remainder of hospital stay.     Lorenza Martinez MS, OTR/L

## 2024-01-10 NOTE — PLAN OF CARE
Problem: PAIN - ADULT  Goal: Verbalizes/displays adequate comfort level or baseline comfort level  Description: Interventions:  - Encourage patient to monitor pain and request assistance  - Assess pain using appropriate pain scale  - Administer analgesics based on type and severity of pain and evaluate response  - Implement non-pharmacological measures as appropriate and evaluate response  - Consider cultural and social influences on pain and pain management  - Notify physician/advanced practitioner if interventions unsuccessful or patient reports new pain  Outcome: Progressing     Problem: INFECTION - ADULT  Goal: Absence or prevention of progression during hospitalization  Description: INTERVENTIONS:  - Assess and monitor for signs and symptoms of infection  - Monitor lab/diagnostic results  - Monitor all insertion sites, i.e. indwelling lines, tubes, and drains  - Administer medications as ordered  - Instruct and encourage patient and family to use good hand hygiene technique  - Identify and instruct in appropriate isolation precautions for identified infection/condition  Outcome: Progressing     Problem: SAFETY ADULT  Goal: Patient will remain free of falls  Description: INTERVENTIONS:  - Educate patient/family on patient safety including physical limitations  - Instruct patient to call for assistance with activity   - Consult OT/PT to assist with strengthening/mobility   - Keep Call bell within reach  - Keep bed low and locked with side rails adjusted as appropriate  - Keep care items and personal belongings within reach  - Initiate and maintain comfort rounds  - Make Fall Risk Sign visible to staff  - Offer Toileting every 2 Hours, in advance of need  - Initiate/Maintain bed alarm  - Obtain necessary fall risk management equipment: socks  - Apply yellow socks and bracelet for high fall risk patients  - Consider moving patient to room near nurses station  Outcome: Progressing  Goal: Maintain or return to  baseline ADL function  Description: INTERVENTIONS:  -  Assess patient's ability to carry out ADLs; assess patient's baseline for ADL function and identify physical deficits which impact ability to perform ADLs (bathing, care of mouth/teeth, toileting, grooming, dressing, etc.)  - Assess/evaluate cause of self-care deficits   - Assess range of motion  - Assess patient's mobility; develop plan if impaired  - Assess patient's need for assistive devices and provide as appropriate  - Encourage maximum independence but intervene and supervise when necessary  - Involve family in performance of ADLs  - Assess for home care needs following discharge   - Consider OT consult to assist with ADL evaluation and planning for discharge  - Provide patient education as appropriate  Outcome: Progressing  Goal: Maintains/Returns to pre admission functional level  Description: INTERVENTIONS:  - Perform AM-PAC 6 Click Basic Mobility/ Daily Activity assessment daily.  - Set and communicate daily mobility goal to care team and patient/family/caregiver.   - Collaborate with rehabilitation services on mobility goals if consulted  - Perform Range of Motion 3 times a day.  - Reposition patient every 2 hours.  - Dangle patient 3 times a day  - Stand patient 3 times a day  - Ambulate patient 3 times a day  - Out of bed to chair 3 times a day   - Out of bed for meals 3 times a day  - Out of bed for toileting  - Record patient progress and toleration of activity level   Outcome: Progressing     Problem: DISCHARGE PLANNING  Goal: Discharge to home or other facility with appropriate resources  Description: INTERVENTIONS:  - Identify barriers to discharge w/patient and caregiver  - Arrange for needed discharge resources and transportation as appropriate  - Identify discharge learning needs (meds, wound care, etc.)  - Arrange for interpretive services to assist at discharge as needed  - Refer to Case Management Department for coordinating discharge  planning if the patient needs post-hospital services based on physician/advanced practitioner order or complex needs related to functional status, cognitive ability, or social support system  Outcome: Progressing     Problem: Knowledge Deficit  Goal: Patient/family/caregiver demonstrates understanding of disease process, treatment plan, medications, and discharge instructions  Description: Complete learning assessment and assess knowledge base.  Interventions:  - Provide teaching at level of understanding  - Provide teaching via preferred learning methods  Outcome: Progressing     Problem: Neurological Deficit  Goal: Neurological status is stable or improving  Description: Interventions:  - Monitor and assess patient's level of consciousness, motor function, sensory function, and level of assistance needed for ADLs.   - Monitor and report changes from baseline. Collaborate with interdisciplinary team to initiate plan and implement interventions as ordered.   - Provide and maintain a safe environment.  - Consider seizure precautions.  - Consider fall precautions.  - Consider aspiration precautions.  - Consider bleeding precautions.  Outcome: Progressing     Problem: Activity Intolerance/Impaired Mobility  Goal: Mobility/activity is maintained at optimum level for patient  Description: Interventions:  - Assess and monitor patient  barriers to mobility and need for assistive/adaptive devices.  - Assess patient's emotional response to limitations.  - Collaborate with interdisciplinary team and initiate plans and interventions as ordered.  - Encourage independent activity per ability.  - Maintain proper body alignment.  - Perform active/passive rom as tolerated/ordered.  - Plan activities to conserve energy.  - Turn patient as appropriate  Outcome: Progressing     Problem: Communication Impairment  Goal: Ability to express needs and understand communication  Description: Assess patient's communication skills and ability  to understand information.  Patient will demonstrate use of effective communication techniques, alternative methods of communication and understanding even if not able to speak.     - Encourage communication and provide alternate methods of communication as needed.  - Collaborate with case management/ for discharge needs.  - Include patient/family/caregiver in decisions related to communication.  Outcome: Progressing     Problem: Potential for Aspiration  Goal: Non-ventilated patient's risk of aspiration is minimized  Description: Assess and monitor vital signs, respiratory status, and labs (WBC).  Monitor for signs of aspiration (tachypnea, cough, rales, wheezing, cyanosis, fever).    - Assess and monitor patient's ability to swallow.  - Place patient up in chair to eat if possible.  - HOB up at 90 degrees to eat if unable to get patient up into chair.  - Supervise patient during oral intake.   - Instruct patient/ family to take small bites.  - Instruct patient/ family to take small single sips when taking liquids.  - Follow patient-specific strategies generated by speech pathologist.  Outcome: Progressing  Goal: Ventilated patient's risk of aspiration is minimized  Description: Assess and monitor vital signs, respiratory status, airway cuff pressure, and labs (WBC).  Monitor for signs of aspiration (tachypnea, cough, rales, wheezing, cyanosis, fever).    - Elevate head of bed 30 degrees if patient has tube feeding.  - Monitor tube feeding.  Outcome: Progressing     Problem: Nutrition  Goal: Nutrition/Hydration status is improving  Description: Monitor and assess patient's nutrition/hydration status for malnutrition (ex- brittle hair, bruises, dry skin, pale skin and conjunctiva, muscle wasting, smooth red tongue, and disorientation). Collaborate with interdisciplinary team and initiate plan and interventions as ordered.  Monitor patient's weight and dietary intake as ordered or per policy. Utilize  nutrition screening tool and intervene per policy. Determine patient's food preferences and provide high-protein, high-caloric foods as appropriate.     - Assist patient with eating.  - Allow adequate time for meals.  - Encourage patient to take dietary supplement as ordered.  - Collaborate with clinical nutritionist.  - Include patient/family/caregiver in decisions related to nutrition.  Outcome: Progressing

## 2024-01-10 NOTE — H&P
Critical access hospital  H&P  Name: Emily Teague 38 y.o. female I MRN: [id number]  Unit/Bed#: -01 I Date of Admission: 1/10/2024   Date of Service: 1/10/2024 I Hospital Day: 0      Assessment/Plan   SIRS (systemic inflammatory response syndrome) (HCC)  Assessment & Plan  Meets criteria with tachypnea, leukocytosis  No suspicion for infection  RSV, covid, flu negative  Check Pro-Deon  order blood cultures, Urine reflex studies    Leukocytosis  Assessment & Plan  Reactive  Denies symptoms of dysuria, polyuria    Stroke-like symptoms  Assessment & Plan  Presenting with left arm, leg, left side of the face tingling and numbness  CT head-no acute intracranial abnormality  CTA head and neck-no occlusion, stenosis, dissection, aneurysm  Stroke pathway  MRI brain  Echo  Lipid Panel  Hemoglobin A1c  Aspirin 81 mg   Atorvastatin 40 mg  Permissive HTN, labetalol if SBP >200  Continue telemetry  PT/OT/ST  Frequent neuro checks.    Mixed hyperlipidemia  Assessment & Plan  Currently on rosuvastatin 10 mg  Check lipid panel a.m.  Continue with atorvastatin 40    Hypertension  Assessment & Plan  On losartan hydrochlorothiazide 100-25 mg daily  Will hold for permissive hypertension  Restart when able           VTE Prophylaxis:  Low VTE score, encourage ambulation   / reason for no mechanical VTE prophylaxis low VTE score    Code Status: Level 1  POLST: POLST form is not discussed and not completed at this time.    Anticipated Length of Stay:  Patient will be admitted on an Observation basis with an anticipated length of stay of less than 2 midnights.   Justification for Hospital Stay: Strokelike symptoms    Chief Complaint:   Tingling and numbness of left face, arm, leg    History of Present Illness:    Emily Teague is a 38 y.o. female with past medical history of HTN, HLD who presents with tingling and numbness of left side of the face, arm, leg.  Patient stated initially started left-sided tingling and  "numbness in the arm on Monday for couple of hours and resolved.  Yesterday, felt \"a little off\".  Today, tingling and numbness started again on the left side of the bosy that prompted ER visit. In the ER, she was a stroke alert. Not a TNK candidate for NIH score 1, and symptoms improving?    She denied weakness, headache, blurred vision, speech difficulty, swallowing difficulty, problems with gait.  Denies taking over-the-counter pulse, OCPs.  Currently on IUD for birth control.  Denies family history of blood clots.     Review of Systems:    Review of Systems   Neurological:  Positive for numbness.   All other systems reviewed and are negative.      Past Medical and Surgical History:     Past Medical History:   Diagnosis Date    Hypertension        Past Surgical History:   Procedure Laterality Date     SECTION         Meds/Allergies:    Prior to Admission medications    Medication Sig Start Date End Date Taking? Authorizing Provider   losartan-hydrochlorothiazide (HYZAAR) 100-25 MG per tablet Take 1 tablet by mouth daily 24  Yes JOSE Schulte   rosuvastatin (CRESTOR) 10 MG tablet Take 1 tablet (10 mg total) by mouth daily 23  Yes JOSE Schulte     I have reviewed home medications with patient personally.    Allergies:   Allergies   Allergen Reactions    Amoxicillin Rash       Social History:     Marital Status: /Civil Union   Occupation:   Patient Pre-hospital Living Situation: home  Patient Pre-hospital Level of Mobility: mobile  Patient Pre-hospital Diet Restrictions: none  Substance Use History:   Social History     Substance and Sexual Activity   Alcohol Use Never    Comment: Social      Social History     Tobacco Use   Smoking Status Former   Smokeless Tobacco Never     Social History     Substance and Sexual Activity   Drug Use Never       Family History:    Family History   Problem Relation Age of Onset    Hypertension Mother     Diabetes Mother     Hypertension Father  "    Diabetes Father     Colon polyps Neg Hx     Colon cancer Neg Hx        Physical Exam:     Vitals:   Blood Pressure: 159/82 (01/10/24 1436)  Pulse: 65 (01/10/24 1436)  Temperature: 97.7 °F (36.5 °C) (01/10/24 1240)  Temp Source: Temporal (01/10/24 1240)  Respirations: (!) 23 (01/10/24 1347)  SpO2: 98 % (01/10/24 1436)    Physical Exam  Vitals and nursing note reviewed.   HENT:      Head: Normocephalic and atraumatic.      Mouth/Throat:      Mouth: Mucous membranes are moist.      Pharynx: Oropharynx is clear.   Eyes:      General: No visual field deficit.     Extraocular Movements: Extraocular movements intact.      Conjunctiva/sclera: Conjunctivae normal.      Pupils: Pupils are equal, round, and reactive to light.   Cardiovascular:      Rate and Rhythm: Regular rhythm. Tachycardia present.      Pulses: Normal pulses.      Comments: Tachycardic during my exam  Pulmonary:      Effort: Pulmonary effort is normal.   Abdominal:      General: Bowel sounds are normal.      Palpations: Abdomen is soft.   Skin:     General: Skin is warm and dry.      Capillary Refill: Capillary refill takes less than 2 seconds.   Neurological:      Mental Status: She is alert and oriented to person, place, and time.      Cranial Nerves: No dysarthria or facial asymmetry.      Motor: No atrophy or abnormal muscle tone.      Coordination: Coordination is intact. Romberg sign negative. Coordination normal.      Gait: Gait is intact.      Deep Tendon Reflexes:      Reflex Scores:       Bicep reflexes are 2+ on the right side and 2+ on the left side.       Patellar reflexes are 2+ on the right side and 2+ on the left side.     Comments: Felt sensation different and left upper extremity, left maxillary, mandibular distribution of face, left lower extremity.  Beside whispering test different in left ear compared to right  Hand  4+ in left , right 5/5         Additional Data:     Lab Results: I have personally reviewed pertinent reports.       Results from last 7 days   Lab Units 01/10/24  1253   WBC Thousand/uL 15.59*   HEMOGLOBIN g/dL 14.7   HEMATOCRIT % 44.2   PLATELETS Thousands/uL 367     Results from last 7 days   Lab Units 01/10/24  1346   POTASSIUM mmol/L 4.2   CHLORIDE mmol/L 100   CO2 mmol/L 22   BUN mg/dL 15   CREATININE mg/dL 0.75   CALCIUM mg/dL 9.4     Results from last 7 days   Lab Units 01/10/24  1346   INR  0.95       Imaging: I have personally reviewed pertinent reports.      X-ray chest 1 view portable    Result Date: 1/10/2024  Narrative: CHEST INDICATION:   left sided tingling. COMPARISON: 2/29/2020 EXAM PERFORMED/VIEWS:  XR CHEST PORTABLE FINDINGS: Cardiomediastinal silhouette appears unremarkable. The lungs are clear.  No pneumothorax or pleural effusion. Osseous structures appear within normal limits for patient age.     Impression: No acute cardiopulmonary disease. Workstation performed: [id number]     CT stroke alert brain    Result Date: 1/10/2024  Narrative: CT BRAIN - STROKE ALERT PROTOCOL INDICATION:   Stroke Alert. COMPARISON: Same day CTA head and neck TECHNIQUE:  CT examination of the brain was performed.  In addition to axial images, coronal reformatted images were created and submitted for interpretation. Radiation dose length product (DLP) for this visit: .  This examination, like all CT scans performed in the [facility], was performed utilizing techniques to minimize radiation dose exposure, including the use of iterative reconstruction  and automated exposure control. IMAGE QUALITY:  Diagnostic. FINDINGS: PARENCHYMA:  No intracranial mass, mass effect or midline shift. No CT signs of acute infarction.  No acute parenchymal hemorrhage. VENTRICLES AND EXTRA-AXIAL SPACES:  Normal for the patient's age. VISUALIZED ORBITS: Normal visualized orbits. PARANASAL SINUSES: Normal visualized paranasal sinuses. CALVARIUM AND EXTRACRANIAL SOFT TISSUES:   Normal.     Impression: -No acute intracranial  abnormality. Findings were directly discussed with Robel Guillory at 1:35 p.m. on 1/10/2024. Workstation performed: [id number]     CTA stroke alert (head/neck)    Result Date: 1/10/2024  Narrative: CTA NECK AND BRAIN WITH CONTRAST INDICATION: Stroke Alert COMPARISON:   Same day CT head TECHNIQUE:   Post contrast imaging was performed after administration of iodinated contrast through the neck and brain. Post contrast axial 0.625 mm images timed to opacify the arterial system. 3D rendering was performed on an independent workstation.   MIP reconstructions performed. Coronal reconstructions were performed of the noncontrast portion of the brain. Radiation dose length product (DLP) for this visit:  389 mGy-cm .  This examination, like all CT scans performed in the [facility], was performed utilizing techniques to minimize radiation dose exposure, including the use of iterative reconstruction and automated exposure control. IV Contrast: IMAGE QUALITY:   Diagnostic FINDINGS: CERVICAL VASCULATURE AORTIC ARCH AND GREAT VESSELS:  Normal aortic arch and great vessel origins. Normal visualized subclavian vessels. RIGHT VERTEBRAL ARTERY CERVICAL SEGMENT:  Normal origin. The vessel is normal in caliber throughout the neck. LEFT VERTEBRAL ARTERY CERVICAL SEGMENT:  Normal origin. The vessel is normal in caliber throughout the neck. RIGHT EXTRACRANIAL CAROTID SEGMENT:  Normal caliber common carotid artery.  Normal bifurcation and cervical internal carotid artery.  No stenosis or dissection. LEFT EXTRACRANIAL CAROTID SEGMENT:  Normal caliber common carotid artery.  Normal bifurcation and cervical internal carotid artery.  No stenosis or dissection. NASCET criteria was used to determine the degree of internal carotid artery diameter stenosis. INTRACRANIAL VASCULATURE INTERNAL CAROTID ARTERIES:  Normal enhancement of the intracranial portions of the internal carotid arteries. Ophthalmic arteries are patent. ANTERIOR  CIRCULATION:  Symmetric A1 segments and anterior cerebral arteries with normal enhancement.  Normal anterior communicating artery. MIDDLE CEREBRAL ARTERY CIRCULATION:  M1 segment and middle cerebral artery branches demonstrate normal enhancement bilaterally. DISTAL VERTEBRAL ARTERIES:  Normal distal vertebral arteries. Proximal posterior inferior cerebellar arteries are not well visualized however are patent distally. BASILAR ARTERY:  Basilar artery is normal in caliber.  Normal superior cerebellar arteries. POSTERIOR CEREBRAL ARTERIES: Both posterior cerebral arteries arises from the basilar tip.  Both arteries demonstrate normal enhancement.   Right posterior communicating artery. VENOUS STRUCTURES:  Normal. NON VASCULAR ANATOMY BONY STRUCTURES:  No acute osseous abnormality. SOFT TISSUES OF THE NECK:  Normal. THORACIC INLET:  Unremarkable.     Impression: CTA head: -No large vessel occlusion, high-grade stenosis or aneurysm. CTA neck: -No high-grade stenosis, dissection or aneurysm. Findings were directly discussed with Robel Guillory at 1:35 p.m. on 1/10/2024. Workstation performed: [id number]       EKG, Pathology, and Other Studies Reviewed on Admission:   EKG: NSR, reviewed    Epic / Care Everywhere Records Reviewed: Yes     ** Please Note: This note has been constructed using a voice recognition system. **

## 2024-01-10 NOTE — ASSESSMENT & PLAN NOTE
Emily Teague is a 38 year old woman with PMHx including HTN, no prior neurological hx, who presented for evaluation of left-sided numbness/tingling. No history of similar symptoms but did have brief episode of transient left arm numbness the day prior to admission. Symptoms have since resolved. She reported partial face, partial arm and partial leg involvement with symptoms. Does not exactly fit well in regard to localization in the CNS (or even the PNS but possible). Lower suspicion for stroke/TIA. No headache reported with symptoms. With age and symptoms, also wanted to assess for demyelinating disease. MRI brain and c-spine with and without contrast without any acute findings or even any white matter disease, no findings to explain presenting symptoms. Possible that symptoms could be related to stress reaction in light of recent increased stress or related to BP in light of hx of HTN. She has since had resolution of symptoms without recurrence, with normal neurologic examination. No noted white matter disease or any acute findings otherwise, lower suspicion at this time for demyelinating disease. No further inpatient neurologic workup at this time.     Plan:  - discontinue stroke pathway  - no need for aspirin or statin from a neurologic perspective  - at least one time follow up for interval evaluation in neurology clinic  - rest of care per primary    Will follow as needed at this time, please reach out with questions in the interim.

## 2024-01-10 NOTE — ASSESSMENT & PLAN NOTE
On losartan hydrochlorothiazide 100-25 mg daily  Will hold for permissive hypertension  Restart when able

## 2024-01-11 ENCOUNTER — APPOINTMENT (OUTPATIENT)
Dept: NON INVASIVE DIAGNOSTICS | Facility: HOSPITAL | Age: 39
End: 2024-01-11
Payer: COMMERCIAL

## 2024-01-11 ENCOUNTER — APPOINTMENT (OUTPATIENT)
Dept: MRI IMAGING | Facility: HOSPITAL | Age: 39
End: 2024-01-11
Payer: COMMERCIAL

## 2024-01-11 LAB
ANION GAP SERPL CALCULATED.3IONS-SCNC: 10 MMOL/L
AORTIC ROOT: 2.4 CM
APICAL FOUR CHAMBER EJECTION FRACTION: 66 %
ASCENDING AORTA: 2.6 CM
BSA FOR ECHO PROCEDURE: 1.6 M2
BUN SERPL-MCNC: 17 MG/DL (ref 5–25)
CALCIUM SERPL-MCNC: 9.1 MG/DL (ref 8.4–10.2)
CHLORIDE SERPL-SCNC: 103 MMOL/L (ref 96–108)
CHOLEST SERPL-MCNC: 149 MG/DL
CO2 SERPL-SCNC: 21 MMOL/L (ref 21–32)
CREAT SERPL-MCNC: 0.8 MG/DL (ref 0.6–1.3)
DOP CALC LVOT AREA: 2.83 CM2
DOP CALC LVOT DIAMETER: 1.9 CM
E WAVE DECELERATION TIME: 271 MS
E/A RATIO: 0.76
ERYTHROCYTE [DISTWIDTH] IN BLOOD BY AUTOMATED COUNT: 12.4 % (ref 11.6–15.1)
EST. AVERAGE GLUCOSE BLD GHB EST-MCNC: 117 MG/DL
FRACTIONAL SHORTENING: 26 (ref 28–44)
GFR SERPL CREATININE-BSD FRML MDRD: 93 ML/MIN/1.73SQ M
GLUCOSE P FAST SERPL-MCNC: 98 MG/DL (ref 65–99)
GLUCOSE SERPL-MCNC: 98 MG/DL (ref 65–140)
HBA1C MFR BLD: 5.7 %
HCT VFR BLD AUTO: 41.5 % (ref 34.8–46.1)
HDLC SERPL-MCNC: 51 MG/DL
HGB BLD-MCNC: 13.7 G/DL (ref 11.5–15.4)
INTERVENTRICULAR SEPTUM IN DIASTOLE (PARASTERNAL SHORT AXIS VIEW): 1 CM
INTERVENTRICULAR SEPTUM: 1 CM (ref 0.6–1.1)
LDLC SERPL CALC-MCNC: 78 MG/DL (ref 0–100)
LEFT ATRIUM AREA SYSTOLE SINGLE PLANE A4C: 8.7 CM2
LEFT ATRIUM SIZE: 2.9 CM
LEFT INTERNAL DIMENSION IN SYSTOLE: 2.6 CM (ref 2.1–4)
LEFT VENTRICULAR INTERNAL DIMENSION IN DIASTOLE: 3.5 CM (ref 3.5–6)
LEFT VENTRICULAR POSTERIOR WALL IN END DIASTOLE: 0.7 CM
LEFT VENTRICULAR STROKE VOLUME: 26 ML
LVSV (TEICH): 26 ML
MCH RBC QN AUTO: 29.7 PG (ref 26.8–34.3)
MCHC RBC AUTO-ENTMCNC: 33 G/DL (ref 31.4–37.4)
MCV RBC AUTO: 90 FL (ref 82–98)
MV E'TISSUE VEL-SEP: 9 CM/S
MV PEAK A VEL: 0.86 M/S
MV PEAK E VEL: 65 CM/S
MV STENOSIS PRESSURE HALF TIME: 79 MS
MV VALVE AREA P 1/2 METHOD: 2.78
PLATELET # BLD AUTO: 338 THOUSANDS/UL (ref 149–390)
PMV BLD AUTO: 9.8 FL (ref 8.9–12.7)
POTASSIUM SERPL-SCNC: 3.4 MMOL/L (ref 3.5–5.3)
PROCALCITONIN SERPL-MCNC: <0.05 NG/ML
RBC # BLD AUTO: 4.62 MILLION/UL (ref 3.81–5.12)
RIGHT ATRIUM AREA SYSTOLE A4C: 11.2 CM2
RIGHT VENTRICLE ID DIMENSION: 3 CM
SL CV LV EF: 60
SL CV PED ECHO LEFT VENTRICLE DIASTOLIC VOLUME (MOD BIPLANE) 2D: 52 ML
SL CV PED ECHO LEFT VENTRICLE SYSTOLIC VOLUME (MOD BIPLANE) 2D: 26 ML
SODIUM SERPL-SCNC: 134 MMOL/L (ref 135–147)
TRICUSPID ANNULAR PLANE SYSTOLIC EXCURSION: 1.4 CM
TRIGL SERPL-MCNC: 102 MG/DL
WBC # BLD AUTO: 14.16 THOUSAND/UL (ref 4.31–10.16)

## 2024-01-11 PROCEDURE — 80061 LIPID PANEL: CPT | Performed by: INTERNAL MEDICINE

## 2024-01-11 PROCEDURE — 80048 BASIC METABOLIC PNL TOTAL CA: CPT | Performed by: INTERNAL MEDICINE

## 2024-01-11 PROCEDURE — 93306 TTE W/DOPPLER COMPLETE: CPT

## 2024-01-11 PROCEDURE — 70553 MRI BRAIN STEM W/O & W/DYE: CPT

## 2024-01-11 PROCEDURE — 99233 SBSQ HOSP IP/OBS HIGH 50: CPT | Performed by: INTERNAL MEDICINE

## 2024-01-11 PROCEDURE — 99214 OFFICE O/P EST MOD 30 MIN: CPT | Performed by: STUDENT IN AN ORGANIZED HEALTH CARE EDUCATION/TRAINING PROGRAM

## 2024-01-11 PROCEDURE — A9585 GADOBUTROL INJECTION: HCPCS | Performed by: INTERNAL MEDICINE

## 2024-01-11 PROCEDURE — 93306 TTE W/DOPPLER COMPLETE: CPT | Performed by: INTERNAL MEDICINE

## 2024-01-11 PROCEDURE — 72156 MRI NECK SPINE W/O & W/DYE: CPT

## 2024-01-11 PROCEDURE — 84145 PROCALCITONIN (PCT): CPT | Performed by: INTERNAL MEDICINE

## 2024-01-11 PROCEDURE — 85027 COMPLETE CBC AUTOMATED: CPT | Performed by: INTERNAL MEDICINE

## 2024-01-11 RX ORDER — GADOBUTROL 604.72 MG/ML
6 INJECTION INTRAVENOUS
Status: COMPLETED | OUTPATIENT
Start: 2024-01-11 | End: 2024-01-11

## 2024-01-11 RX ORDER — ENOXAPARIN SODIUM 100 MG/ML
40 INJECTION SUBCUTANEOUS
Status: DISCONTINUED | OUTPATIENT
Start: 2024-01-11 | End: 2024-01-12

## 2024-01-11 RX ORDER — POTASSIUM CHLORIDE 20 MEQ/1
40 TABLET, EXTENDED RELEASE ORAL ONCE
Qty: 2 TABLET | Refills: 0 | Status: COMPLETED | OUTPATIENT
Start: 2024-01-11 | End: 2024-01-11

## 2024-01-11 RX ADMIN — ASPIRIN 81 MG: 81 TABLET, CHEWABLE ORAL at 09:54

## 2024-01-11 RX ADMIN — ATORVASTATIN CALCIUM 40 MG: 40 TABLET, FILM COATED ORAL at 18:39

## 2024-01-11 RX ADMIN — ENOXAPARIN SODIUM 40 MG: 100 INJECTION SUBCUTANEOUS at 14:37

## 2024-01-11 RX ADMIN — POTASSIUM CHLORIDE 40 MEQ: 1500 TABLET, EXTENDED RELEASE ORAL at 09:54

## 2024-01-11 RX ADMIN — GADOBUTROL 6 ML: 604.72 INJECTION INTRAVENOUS at 18:11

## 2024-01-11 NOTE — SPEECH THERAPY NOTE
Speech Language/Pathology Screen     Order received, chart reviewed. NIH 1. Pt passed nursing dysphagia screen and has been tolerating regular texture diet, thin liquids, and PO meds. Spoke w/ RN, RN denies concerns for dysphagia, aspiration, and/or speech/language deficits. Spoke w/ pt. Pt denies dysphagia, odynophagia, globus sensation, s/s aspiration, and/or speech/language deficits at this time. No gross speech/language deficits observed during conversation. Formal ST evaluation not indicated at this time. Please re-consult if medically necessary.

## 2024-01-11 NOTE — PROGRESS NOTES
Progress Note - Neurology   Emily Teague 38 y.o. female MRN: [id number]      Assessment/Plan   Stroke-like symptoms  Assessment & Plan  The patient is a 38 year old female with hx of hypertension, no prior neurological hx, whom presents to [facility] ED with chief complaint of left face, arm, leg numbness and tingling (patchy), starting around 11 a.m. day or arrival,  she reports she had a transient few minutes episode of numbness of her left arm on Monday, which resolved.  She never had symptoms like this before. The patient was not a tnk candidate.     CT of head- No acute intracranial abnormality.   CTA of head and neck - CTA head:. No large vessel occlusion, high-grade stenosis or aneurysm.No high-grade stenosis, dissection or aneurysm.    - Stroke pathway,  MRI brain with and with out contrast, MRI of cervical spine with and with out contrast.   Echo pending  Lipid Panel - LDL was 78  Hemoglobin A1c was 5.7  Aspirin 81 mg daily starting 1/11/23, Loaded with Aspirin in the ED. (Attending reviewed with ED provider)  Atorvastatin 40 mg  Continue telemetry  PT/OT/ST  Frequent neuro checks. Continue to monitor and notify neurology with any changes.   - Medical management and supportive care per primary team. Correction of any metabolic or infectious disturbances.  Infectious work up per medicine team.   -Reviewed case with attending, plan of care per attending physician.       Emily Teague will need follow up in in 6 weeks with general attending. She will not require outpatient neurological testing.    Subjective:   Neurological follow up.   Reports feeling better today.   Numbness resolved of left side, cramping in lower left extremity this a.m.  No weakness or neurological complaints.     Vitals: Blood pressure 111/70, pulse 66, temperature 98.2 °F (36.8 °C), temperature source Oral, resp. rate 18, height 5' (1.524 m), weight 63.5 kg (140 lb), last menstrual period 12/23/2023, SpO2 97%, not currently  breastfeeding.,Body mass index is 27.34 kg/m².    Current Facility-Administered Medications   Medication Dose Route Frequency Provider Last Rate    aspirin  81 mg Oral Daily Indiana Tracey MD      atorvastatin  40 mg Oral QPM Indiana Tracey MD          Physical Exam:   Vital Sign reviewed  Constitutional - in NAD  HEENT - NC/AT, no icterus noted, conjunctiva clear, oral mucosa free of exudate  Cardiac - No murmur noted, rate regular  Lungs - Clear to auscultation bilaterally, no wheezing noted.  Abdomen - Soft and non tender, non distended  Extremities - No edema noted  Skin - no rashes noted  Neurological  Mental status - the patient is awake alert oriented x 3, with no evidence of aphasia or dysarthria, patient is able to follow simple commands, is able to follow complex commands.  No para-phasic errors noted.  Cranial nerves 2 through 12 are intact  Motor - 5/5 upper extremities and lower extremities, without drift, normal tone and bulk.  No drift in the upper or lowers, non focal motor examination.   No tremor or fixed deficit noted  Rapid movements are equal bilaterally  Sensation - non-focal to touch, no neglect  Coordination -  no ataxia noted on finger-to-nose or heel-to-shin  Reflexes are equal 1 in uppers and lowers  Toes are down-going bilaterally  No evidence of clonus or myoclonus or tremor.  No evidence of seizure activity, observed.      (Examined alongside attending)    Lab, Imaging and other studies:     Recent Results (from the past 24 hour(s))   ECG 12 lead    Collection Time: 01/10/24 12:51 PM   Result Value Ref Range    Ventricular Rate 86 BPM    Atrial Rate 86 BPM    RI Interval 148 ms    QRSD Interval 76 ms    QT Interval 366 ms    QTC Interval 437 ms    P Morley 69 degrees    QRS Axis 80 degrees    T Wave Axis 14 degrees   CBC and Platelet    Collection Time: 01/10/24 12:53 PM   Result Value Ref Range    WBC 15.59 (H) 4.31 - 10.16 Thousand/uL    RBC 4.95 3.81 - 5.12 Million/uL    Hemoglobin  "14.7 11.5 - 15.4 g/dL    Hematocrit 44.2 34.8 - 46.1 %    MCV 89 82 - 98 fL    MCH 29.7 26.8 - 34.3 pg    MCHC 33.3 31.4 - 37.4 g/dL    RDW 12.4 11.6 - 15.1 %    Platelets 367 149 - 390 Thousands/uL    MPV 9.9 8.9 - 12.7 fL   HS Troponin 0hr (reflex protocol)    Collection Time: 01/10/24 12:53 PM   Result Value Ref Range    hs TnI 0hr <2 \"Refer to ACS Flowchart\"- see link ng/L   FLU/RSV/COVID - if FLU/RSV clinically relevant    Collection Time: 01/10/24 12:53 PM    Specimen: Nasopharyngeal Swab; Nares   Result Value Ref Range    SARS-CoV-2 Negative Negative    INFLUENZA A PCR Negative Negative    INFLUENZA B PCR Negative Negative    RSV PCR Negative Negative   Hemoglobin A1C w/ EAG Estimation    Collection Time: 01/10/24 12:53 PM   Result Value Ref Range    Hemoglobin A1C 5.7 (H) Normal 4.0-5.6%; PreDiabetic 5.7-6.4%; Diabetic >=6.5%; Glycemic control for adults with diabetes <7.0% %     mg/dl   ECG 12 lead    Collection Time: 01/10/24 12:57 PM   Result Value Ref Range    Ventricular Rate 78 BPM    Atrial Rate 78 BPM    PA Interval 144 ms    QRSD Interval 78 ms    QT Interval 388 ms    QTC Interval 442 ms    P Axis 60 degrees    QRS Axis 79 degrees    T Wave Axis 14 degrees   ECG 12 lead    Collection Time: 01/10/24  1:21 PM   Result Value Ref Range    Ventricular Rate 90 BPM    Atrial Rate 90 BPM    PA Interval 152 ms    QRSD Interval 72 ms    QT Interval 372 ms    QTC Interval 455 ms    P Axis 87 degrees    QRS Axis 87 degrees    T Wave Axis 24 degrees   ECG 12 lead    Collection Time: 01/10/24  1:27 PM   Result Value Ref Range    Ventricular Rate 89 BPM    Atrial Rate 89 BPM    PA Interval 152 ms    QRSD Interval 72 ms    QT Interval 360 ms    QTC Interval 438 ms    P Sherman 69 degrees    QRS Axis 80 degrees    T Wave Axis 26 degrees   Basic metabolic panel    Collection Time: 01/10/24  1:46 PM   Result Value Ref Range    Sodium 134 (L) 135 - 147 mmol/L    Potassium 4.2 3.5 - 5.3 mmol/L    Chloride 100 96 - " "108 mmol/L    CO2 22 21 - 32 mmol/L    ANION GAP 12 mmol/L    BUN 15 5 - 25 mg/dL    Creatinine 0.75 0.60 - 1.30 mg/dL    Glucose 105 65 - 140 mg/dL    Calcium 9.4 8.4 - 10.2 mg/dL    eGFR 101 ml/min/1.73sq m   Protime-INR    Collection Time: 01/10/24  1:46 PM   Result Value Ref Range    Protime 13.1 11.6 - 14.5 seconds    INR 0.95 0.84 - 1.19   APTT    Collection Time: 01/10/24  1:46 PM   Result Value Ref Range    PTT 26 23 - 37 seconds   hCG, qualitative pregnancy    Collection Time: 01/10/24  1:46 PM   Result Value Ref Range    Preg, Serum Negative Negative   Procalcitonin    Collection Time: 01/10/24  1:46 PM   Result Value Ref Range    Procalcitonin <0.05 <=0.25 ng/ml   HS Troponin I 4hr    Collection Time: 01/10/24  5:29 PM   Result Value Ref Range    hs TnI 4hr <2 \"Refer to ACS Flowchart\"- see link ng/L    Delta 4hr hsTnI     Blood culture    Collection Time: 01/10/24  5:30 PM    Specimen: Arm, Right; Blood   Result Value Ref Range    Blood Culture Received in Microbiology Lab. Culture in Progress.    Blood culture    Collection Time: 01/10/24  5:40 PM    Specimen: Arm, Left; Blood   Result Value Ref Range    Blood Culture Received in Microbiology Lab. Culture in Progress.    UA w Reflex to Microscopic w Reflex to Culture    Collection Time: 01/10/24  7:00 PM    Specimen: Urine, Clean Catch   Result Value Ref Range    Color, UA Yellow     Clarity, UA Clear     Specific Gravity, UA 1.010 1.005 - 1.030    pH, UA 7.0 4.5, 5.0, 5.5, 6.0, 6.5, 7.0, 7.5, 8.0    Leukocytes, UA Negative Negative    Nitrite, UA Negative Negative    Protein, UA Trace (A) Negative mg/dl    Glucose, UA Negative Negative mg/dl    Ketones, UA 40 (2+) (A) Negative mg/dl    Urobilinogen, UA <2.0 <2.0 mg/dl mg/dl    Bilirubin, UA Negative Negative    Occult Blood, UA Negative Negative   Urine Microscopic    Collection Time: 01/10/24  7:00 PM   Result Value Ref Range    RBC, UA 0-1 None Seen, 0-1, 1-2, 2-4, 0-5 /hpf    WBC, UA 0-1 None Seen, " 0-1, 1-2, 0-5, 2-4 /hpf    Epithelial Cells Occasional None Seen, Occasional /hpf    Bacteria, UA None Seen None Seen, Occasional /hpf   Basic metabolic panel    Collection Time: 01/11/24  3:25 AM   Result Value Ref Range    Sodium 134 (L) 135 - 147 mmol/L    Potassium 3.4 (L) 3.5 - 5.3 mmol/L    Chloride 103 96 - 108 mmol/L    CO2 21 21 - 32 mmol/L    ANION GAP 10 mmol/L    BUN 17 5 - 25 mg/dL    Creatinine 0.80 0.60 - 1.30 mg/dL    Glucose 98 65 - 140 mg/dL    Glucose, Fasting 98 65 - 99 mg/dL    Calcium 9.1 8.4 - 10.2 mg/dL    eGFR 93 ml/min/1.73sq m   CBC    Collection Time: 01/11/24  3:25 AM   Result Value Ref Range    WBC 14.16 (H) 4.31 - 10.16 Thousand/uL    RBC 4.62 3.81 - 5.12 Million/uL    Hemoglobin 13.7 11.5 - 15.4 g/dL    Hematocrit 41.5 34.8 - 46.1 %    MCV 90 82 - 98 fL    MCH 29.7 26.8 - 34.3 pg    MCHC 33.0 31.4 - 37.4 g/dL    RDW 12.4 11.6 - 15.1 %    Platelets 338 149 - 390 Thousands/uL    MPV 9.8 8.9 - 12.7 fL   Lipid Panel with Direct LDL reflex    Collection Time: 01/11/24  3:25 AM   Result Value Ref Range    Cholesterol 149 See Comment mg/dL    Triglycerides 102 See Comment mg/dL    HDL, Direct 51 >=50 mg/dL    LDL Calculated 78 0 - 100 mg/dL   Echo complete w/ contrast if indicated    Collection Time: 01/11/24  9:45 AM   Result Value Ref Range    RAA A4C 11.2 cm2    DIANA A4C 8.7 cm2    MV Peak A Koko 0.86 m/s    MV stenosis pressure 1/2 time 79 ms    MV Peak E Koko 65 cm/s    LVOT diameter 1.9 cm    E wave deceleration time 271 ms    E/A ratio 0.76     MV valve area p 1/2 method 2.78     RVID d 3.0 cm    A4C EF 66 %    Left ventricular stroke volume (2D) 26.00 mL    IVSd 1.00 cm    Tricuspid annular plane systolic excursion 1.40 cm    Ao root 2.40 cm    LVPWd 0.70 cm    LA size 2.9 cm    Asc Ao 2.6 cm    FS 26 28 - 44    LVIDS 2.60 cm    IVS 1 cm    LVIDd 3.50 cm    LEFT VENTRICLE SYSTOLIC VOLUME (MOD BIPLANE) 2D 26 mL    LV DIASTOLIC VOLUME (MOD BIPLANE) 2D 52 mL    MV E' Tissue Velocity Septal  9 cm/s    LVSV, 2D 26 mL    BSA 1.6 m2    LVOT area 2.83 cm2     Procedure: X-ray chest 1 view portable    Result Date: 1/10/2024  Narrative: CHEST INDICATION:   left sided tingling. COMPARISON: 2/29/2020 EXAM PERFORMED/VIEWS:  XR CHEST PORTABLE FINDINGS: Cardiomediastinal silhouette appears unremarkable. The lungs are clear.  No pneumothorax or pleural effusion. Osseous structures appear within normal limits for patient age.     Impression: No acute cardiopulmonary disease. Workstation performed: [id number]     Procedure: CT stroke alert brain    Result Date: 1/10/2024  Narrative: CT BRAIN - STROKE ALERT PROTOCOL INDICATION:   Stroke Alert. COMPARISON: Same day CTA head and neck TECHNIQUE:  CT examination of the brain was performed.  In addition to axial images, coronal reformatted images were created and submitted for interpretation. Radiation dose length product (DLP) for this visit: .  This examination, like all CT scans performed in the [facility], was performed utilizing techniques to minimize radiation dose exposure, including the use of iterative reconstruction  and automated exposure control. IMAGE QUALITY:  Diagnostic. FINDINGS: PARENCHYMA:  No intracranial mass, mass effect or midline shift. No CT signs of acute infarction.  No acute parenchymal hemorrhage. VENTRICLES AND EXTRA-AXIAL SPACES:  Normal for the patient's age. VISUALIZED ORBITS: Normal visualized orbits. PARANASAL SINUSES: Normal visualized paranasal sinuses. CALVARIUM AND EXTRACRANIAL SOFT TISSUES:   Normal.     Impression: -No acute intracranial abnormality. Findings were directly discussed with Robel Guillory at 1:35 p.m. on 1/10/2024. Workstation performed: [id number]     Procedure: CTA stroke alert (head/neck)    Result Date: 1/10/2024  Narrative: CTA NECK AND BRAIN WITH CONTRAST INDICATION: Stroke Alert COMPARISON:   Same day CT head TECHNIQUE:   Post contrast imaging was performed after administration of iodinated  contrast through the neck and brain. Post contrast axial 0.625 mm images timed to opacify the arterial system. 3D rendering was performed on an independent workstation.   MIP reconstructions performed. Coronal reconstructions were performed of the noncontrast portion of the brain. Radiation dose length product (DLP) for this visit:  389 mGy-cm .  This examination, like all CT scans performed in the [facility], was performed utilizing techniques to minimize radiation dose exposure, including the use of iterative reconstruction and automated exposure control. IV Contrast: IMAGE QUALITY:   Diagnostic FINDINGS: CERVICAL VASCULATURE AORTIC ARCH AND GREAT VESSELS:  Normal aortic arch and great vessel origins. Normal visualized subclavian vessels. RIGHT VERTEBRAL ARTERY CERVICAL SEGMENT:  Normal origin. The vessel is normal in caliber throughout the neck. LEFT VERTEBRAL ARTERY CERVICAL SEGMENT:  Normal origin. The vessel is normal in caliber throughout the neck. RIGHT EXTRACRANIAL CAROTID SEGMENT:  Normal caliber common carotid artery.  Normal bifurcation and cervical internal carotid artery.  No stenosis or dissection. LEFT EXTRACRANIAL CAROTID SEGMENT:  Normal caliber common carotid artery.  Normal bifurcation and cervical internal carotid artery.  No stenosis or dissection. NASCET criteria was used to determine the degree of internal carotid artery diameter stenosis. INTRACRANIAL VASCULATURE INTERNAL CAROTID ARTERIES:  Normal enhancement of the intracranial portions of the internal carotid arteries. Ophthalmic arteries are patent. ANTERIOR CIRCULATION:  Symmetric A1 segments and anterior cerebral arteries with normal enhancement.  Normal anterior communicating artery. MIDDLE CEREBRAL ARTERY CIRCULATION:  M1 segment and middle cerebral artery branches demonstrate normal enhancement bilaterally. DISTAL VERTEBRAL ARTERIES:  Normal distal vertebral arteries. Proximal posterior inferior cerebellar arteries  are not well visualized however are patent distally. BASILAR ARTERY:  Basilar artery is normal in caliber.  Normal superior cerebellar arteries. POSTERIOR CEREBRAL ARTERIES: Both posterior cerebral arteries arises from the basilar tip.  Both arteries demonstrate normal enhancement.   Right posterior communicating artery. VENOUS STRUCTURES:  Normal. NON VASCULAR ANATOMY BONY STRUCTURES:  No acute osseous abnormality. SOFT TISSUES OF THE NECK:  Normal. THORACIC INLET:  Unremarkable.     Impression: CTA head: -No large vessel occlusion, high-grade stenosis or aneurysm. CTA neck: -No high-grade stenosis, dissection or aneurysm. Findings were directly discussed with Robel Guillory at 1:35 p.m. on 1/10/2024. Workstation performed: [id number]       Counseling / Coordination of Care  Reviewed case with neurology attending, history and physical examination, labs and imaging completed, plan of care as per attending physician.    Please see attestation for further details.    Examined alongside attending physician.    (Acted as a scribe today).

## 2024-01-11 NOTE — PROGRESS NOTES
Formerly [facility], later [facility]  Progress Note  Name: Emily Teague I  MRN: [id number]  Unit/Bed#: -01 I Date of Admission: 1/10/2024   Date of Service: 1/11/2024 I Hospital Day: 0    Assessment/Plan   SIRS (systemic inflammatory response syndrome) (HCC)  Assessment & Plan  Meets criteria with tachypnea, leukocytosis  No suspicion for infection  RSV, covid, flu negative  Check Pro-Deon  order blood cultures, Urine reflex studies    Leukocytosis  Assessment & Plan  Reactive  Denies symptoms of dysuria, polyuria    Stroke-like symptoms  Assessment & Plan  Presenting with left arm, leg, left side of the face tingling and numbness  CT head-no acute intracranial abnormality  CTA head and neck-no occlusion, stenosis, dissection, aneurysm  Stroke pathway  MRI brain  Echo  Lipid Panel  Hemoglobin A1c  Aspirin 81 mg   Atorvastatin 40 mg  Permissive HTN, labetalol if SBP >200  Continue telemetry  PT/OT/ST  Frequent neuro checks.    Mixed hyperlipidemia  Assessment & Plan  Currently on rosuvastatin 10 mg  Check lipid panel a.m.  Continue with atorvastatin 40    Hypertension  Assessment & Plan  On losartan hydrochlorothiazide 100-25 mg daily  Will hold for permissive hypertension  Restart when able             VTE Pharmacologic Prophylaxis:   VTE Score: 1 Low Risk (Score 0-2) - Encourage Ambulation. LOW VTE Score noted, patient ambulating without problems. Encourage ambulation, will order lovenox and SCD    Mechanical VTE Prophylaxis in Place: No    Patient Centered Rounds: I have performed bedside rounds with nursing staff today.    Discussions with Specialists or Other Care Team Provider: Neuro, CM,     Education and Discussions with Family / Patient: Updated  (daughter) at bedside.    Current Length of Stay: 0 day(s)    Current Patient Status: Observation     Discharge Plan / Estimated Discharge Date:  pending MRI results    Code Status: No Order      Subjective:   Patient states her symptoms  have improved, feels sensation both sides is same. No focal weakness    Objective:     Vitals:   Temp (24hrs), Av.2 °F (36.8 °C), Min:97.8 °F (36.6 °C), Max:98.4 °F (36.9 °C)    Temp:  [97.8 °F (36.6 °C)-98.4 °F (36.9 °C)] 98.2 °F (36.8 °C)  HR:  [65-87] 66  Resp:  [16-18] 18  BP: (104-159)/(62-86) 111/70  SpO2:  [97 %-99 %] 97 %  Body mass index is 27.34 kg/m².     Input and Output Summary (last 24 hours):       Intake/Output Summary (Last 24 hours) at 2024 1347  Last data filed at 1/10/2024 2301  Gross per 24 hour   Intake 480 ml   Output --   Net 480 ml       Physical Exam:     Physical Exam  Vitals and nursing note reviewed.   Constitutional:       General: She is not in acute distress.     Appearance: She is well-developed.   HENT:      Head: Normocephalic and atraumatic.   Eyes:      Conjunctiva/sclera: Conjunctivae normal.   Cardiovascular:      Rate and Rhythm: Normal rate and regular rhythm.      Heart sounds: No murmur heard.  Pulmonary:      Effort: Pulmonary effort is normal. No respiratory distress.      Breath sounds: Normal breath sounds.   Abdominal:      Palpations: Abdomen is soft.      Tenderness: There is no abdominal tenderness.   Musculoskeletal:         General: No swelling.      Cervical back: Neck supple.   Skin:     General: Skin is warm and dry.      Capillary Refill: Capillary refill takes less than 2 seconds.   Neurological:      Mental Status: She is alert.   Psychiatric:         Mood and Affect: Mood normal.          Additional Data:     Labs:  Results from last 7 days   Lab Units 24  0325   WBC Thousand/uL 14.16*   HEMOGLOBIN g/dL 13.7   HEMATOCRIT % 41.5   PLATELETS Thousands/uL 338     Results from last 7 days   Lab Units 24  0325   SODIUM mmol/L 134*   POTASSIUM mmol/L 3.4*   CHLORIDE mmol/L 103   CO2 mmol/L 21   BUN mg/dL 17   CREATININE mg/dL 0.80   ANION GAP mmol/L 10   CALCIUM mg/dL 9.1   GLUCOSE RANDOM mg/dL 98     Results from last 7 days   Lab Units  01/10/24  1346   INR  0.95         Results from last 7 days   Lab Units 01/10/24  1253   HEMOGLOBIN A1C % 5.7*     Results from last 7 days   Lab Units 01/10/24  1346   PROCALCITONIN ng/ml <0.05       Imaging: No pertinent imaging reviewed.    Recent Cultures (last 7 days):     Results from last 7 days   Lab Units 01/10/24  1740 01/10/24  1730   BLOOD CULTURE  Received in Microbiology Lab. Culture in Progress. Received in Microbiology Lab. Culture in Progress.       Lines/Drains:  Invasive Devices       Peripheral Intravenous Line  Duration             Peripheral IV 01/10/24 Right Antecubital 1 day                    Telemetry:   Telemetry Orders (From admission, onward)               24 Hour Telemetry Monitoring  (ED Bridging Orders Panel)  Continuous x 24 Hours (Telem)        Expiring   Question:  Reason for 24 Hour Telemetry  Answer:  TIA/Suspected CVA/ Confirmed CVA                        Last 24 Hours Medication List:   Current Facility-Administered Medications   Medication Dose Route Frequency Provider Last Rate    aspirin  81 mg Oral Daily Indiana Tracey MD      atorvastatin  40 mg Oral QPM Indiana Tracey MD          Today, Patient Was Seen By: Indiana Tracey MD    ** Please Note: This note has been constructed using a voice recognition system. **

## 2024-01-11 NOTE — PLAN OF CARE
Problem: PAIN - ADULT  Goal: Verbalizes/displays adequate comfort level or baseline comfort level  Description: Interventions:  - Encourage patient to monitor pain and request assistance  - Assess pain using appropriate pain scale  - Administer analgesics based on type and severity of pain and evaluate response  - Implement non-pharmacological measures as appropriate and evaluate response  - Consider cultural and social influences on pain and pain management  - Notify physician/advanced practitioner if interventions unsuccessful or patient reports new pain  Outcome: Progressing     Problem: INFECTION - ADULT  Goal: Absence or prevention of progression during hospitalization  Description: INTERVENTIONS:  - Assess and monitor for signs and symptoms of infection  - Monitor lab/diagnostic results  - Monitor all insertion sites, i.e. indwelling lines, tubes, and drains  - Administer medications as ordered  - Instruct and encourage patient and family to use good hand hygiene technique  - Identify and instruct in appropriate isolation precautions for identified infection/condition  Outcome: Progressing     Problem: SAFETY ADULT  Goal: Patient will remain free of falls  Description: INTERVENTIONS:  - Educate patient/family on patient safety including physical limitations  - Instruct patient to call for assistance with activity   - Consult OT/PT to assist with strengthening/mobility   - Keep Call bell within reach  - Keep bed low and locked with side rails adjusted as appropriate  - Keep care items and personal belongings within reach  - Initiate and maintain comfort rounds  - Make Fall Risk Sign visible to staff  - Obtain necessary fall risk management equipment: non-slip socks  - Apply yellow socks and bracelet for high fall risk patients  - Consider moving patient to room near nurses station  Outcome: Progressing  Goal: Maintain or return to baseline ADL function  Description: INTERVENTIONS:  -  Assess patient's ability  to carry out ADLs; assess patient's baseline for ADL function and identify physical deficits which impact ability to perform ADLs (bathing, care of mouth/teeth, toileting, grooming, dressing, etc.)  - Assess/evaluate cause of self-care deficits   - Assess range of motion  - Assess patient's mobility; develop plan if impaired  - Assess patient's need for assistive devices and provide as appropriate  - Encourage maximum independence but intervene and supervise when necessary  - Involve family in performance of ADLs  - Assess for home care needs following discharge   - Consider OT consult to assist with ADL evaluation and planning for discharge  - Provide patient education as appropriate  Outcome: Progressing  Goal: Maintains/Returns to pre admission functional level  Description: INTERVENTIONS:  - Perform AM-PAC 6 Click Basic Mobility/ Daily Activity assessment daily.  - Set and communicate daily mobility goal to care team and patient/family/caregiver.   - Collaborate with rehabilitation services on mobility goals if consulted  - Perform Range of Motion 3 times a day.  - Ambulate patient 3 times a day  - Out of bed to chair 3 times a day   - Out of bed for meals 3 times a day  - Out of bed for toileting  - Record patient progress and toleration of activity level   Outcome: Progressing     Problem: DISCHARGE PLANNING  Goal: Discharge to home or other facility with appropriate resources  Description: INTERVENTIONS:  - Identify barriers to discharge w/patient and caregiver  - Arrange for needed discharge resources and transportation as appropriate  - Identify discharge learning needs (meds, wound care, etc.)  - Arrange for interpretive services to assist at discharge as needed  - Refer to Case Management Department for coordinating discharge planning if the patient needs post-hospital services based on physician/advanced practitioner order or complex needs related to functional status, cognitive ability, or social  support system  Outcome: Progressing     Problem: Knowledge Deficit  Goal: Patient/family/caregiver demonstrates understanding of disease process, treatment plan, medications, and discharge instructions  Description: Complete learning assessment and assess knowledge base.  Interventions:  - Provide teaching at level of understanding  - Provide teaching via preferred learning methods  Outcome: Progressing     Problem: Neurological Deficit  Goal: Neurological status is stable or improving  Description: Interventions:  - Monitor and assess patient's level of consciousness, motor function, sensory function, and level of assistance needed for ADLs.   - Monitor and report changes from baseline. Collaborate with interdisciplinary team to initiate plan and implement interventions as ordered.   - Provide and maintain a safe environment.  - Consider seizure precautions.  - Consider fall precautions.  - Consider aspiration precautions.  - Consider bleeding precautions.  Outcome: Progressing     Problem: Activity Intolerance/Impaired Mobility  Goal: Mobility/activity is maintained at optimum level for patient  Description: Interventions:  - Assess and monitor patient  barriers to mobility and need for assistive/adaptive devices.  - Assess patient's emotional response to limitations.  - Collaborate with interdisciplinary team and initiate plans and interventions as ordered.  - Encourage independent activity per ability.  - Maintain proper body alignment.  - Perform active/passive rom as tolerated/ordered.  - Plan activities to conserve energy.  - Turn patient as appropriate  Outcome: Progressing     Problem: Communication Impairment  Goal: Ability to express needs and understand communication  Description: Assess patient's communication skills and ability to understand information.  Patient will demonstrate use of effective communication techniques, alternative methods of communication and understanding even if not able to  speak.     - Encourage communication and provide alternate methods of communication as needed.  - Collaborate with case management/ for discharge needs.  - Include patient/family/caregiver in decisions related to communication.  Outcome: Progressing     Problem: Nutrition  Goal: Nutrition/Hydration status is improving  Description: Monitor and assess patient's nutrition/hydration status for malnutrition (ex- brittle hair, bruises, dry skin, pale skin and conjunctiva, muscle wasting, smooth red tongue, and disorientation). Collaborate with interdisciplinary team and initiate plan and interventions as ordered.  Monitor patient's weight and dietary intake as ordered or per policy. Utilize nutrition screening tool and intervene per policy. Determine patient's food preferences and provide high-protein, high-caloric foods as appropriate.     - Assist patient with eating.  - Allow adequate time for meals.  - Encourage patient to take dietary supplement as ordered.  - Collaborate with clinical nutritionist.  - Include patient/family/caregiver in decisions related to nutrition.  Outcome: Progressing

## 2024-01-11 NOTE — PHYSICAL THERAPY NOTE
Physical Therapy Screen       01/11/24 9521   Note Type   Note type Screen   Additional Comments PT orders noted and chart reviewed.  Per OT, pt is independent with all functional mobility and has no acute skilled PT needs at this time.  Will discharge pt from PT caseload.     MO SeoT

## 2024-01-11 NOTE — UTILIZATION REVIEW
"Initial Clinical Review    Admission: Date/Time/Statement:   Admission Orders (From admission, onward)       Ordered        01/10/24 1352  Place in Observation  Once                          Orders Placed This Encounter   Procedures    Place in Observation     Standing Status:   Standing     Number of Occurrences:   1     Order Specific Question:   Level of Care     Answer:   Med Surg [16]     ED Arrival Information       Expected   -    Arrival   1/10/2024 12:34    Acuity   Emergent              Means of arrival   Walk-In    Escorted by   Family Member    Service   Hospitalist    Admission type   Emergency              Arrival complaint   left side numbness             Chief Complaint   Patient presents with    Medical Problem     Pt reports numbness and tingling on left side of face and left arm today and Monday, pt reports dizziness and that her eye is shaking        Initial Presentation: 38 y.o. female to ED via walk-in from home   Present to ED with tingling and numbness of left side of the face, arm, leg.  Patient stated initially started left-sided tingling and numbness in the arm on Monday for couple of hours and resolved.  Yesterday, felt \"a little off\".  Today, tingling and numbness started again on the left side of the bosy that prompted ER visit.   PMHX: HTN, HLD   Admitted to OBS with DX: Stroke-like symptoms   on exam: she was a stroke alert. Not a TNK candidate for NIH score 1 ; tachy; tachypnea; hypertensive; leukocytosis;  Neuro eval : Felt sensation different and left upper extremity, left maxillary, mandibular distribution of face, left lower extremity. Beside whispering test different in left ear compared to right.  Hand  4+ in left , right 5/5  PLAN: neuro checks; tele monitoring; f/u procal; monitor labs; f/u blood / urine cx; f/u MRI brain; f/u echo; cont asa / statin ; PT/ OT eval - tx; monitor / trend BP's       ED Triage Vitals   Temperature Pulse Respirations Blood Pressure SpO2 "   01/10/24 1240 01/10/24 1240 01/10/24 1240 01/10/24 1240 01/10/24 1240   97.7 °F (36.5 °C) 89 18 (!) 148/107 97 %      Temp Source Heart Rate Source Patient Position - Orthostatic VS BP Location FiO2 (%)   01/10/24 1240 01/10/24 1240 01/10/24 1240 01/10/24 1240 --   Temporal Monitor Sitting Left arm       Pain Score       01/10/24 1440       No Pain          Wt Readings from Last 1 Encounters:   01/10/24 63.5 kg (140 lb)     Additional Vital Signs:   Date/Time Temp Pulse Resp BP MAP (mmHg) SpO2 O2 Device Patient Position - Orthostatic VS   01/11/24 0737 -- 66 18 111/70 86 97 % None (Room air) Lying   01/11/24 0300 98.2 °F (36.8 °C) 70 16 104/62 78 99 % None (Room air) Lying   01/10/24 2355 98.4 °F (36.9 °C) 70 16 145/79 106 99 % None (Room air) Lying   01/10/24 2135 98.3 °F (36.8 °C) 86 18 138/86 107 99 % None (Room air) Lying   01/10/24 1858 98.2 °F (36.8 °C) 87 17 121/63 83 98 % None (Room air) Lying   01/10/24 1700 97.8 °F (36.6 °C) -- 18 -- -- -- -- --   01/10/24 1628 -- 86 -- 128/77 96 98 % -- Lying   01/10/24 1436 -- 65 -- 159/82 112 98 % -- Lying   01/10/24 13:47:15 -- 86 23 Abnormal  -- -- 100 % -- --   01/10/24 1330 -- 82 15 167/77 -- 100 % -- --   01/10/24 1315 -- 92 16 160/70 -- 100 % None (Room air) --   01/10/24 1300 -- 103 16 140/75 -- 99 % None (Room air) --   01/10/24 1255 -- 89 17 174/85 Abnormal  -- 100 % -- --   01/10/24 1251 -- 86 18 136/86 -- 100 % None (Room air) --   01/10/24 1250 -- 81 25 Abnormal  -- -- 100 % -- --   01/10/24 1240 97.7 °F (36.5 °C) 89 18 148/107 Abnormal  122 97 % None (Room air) Sitting         EKG: Normal sinus rhythm with sinus arrhythmia  Normal ECG  When compared with ECG of 29-FEB-2020 14:34,  T wave inversion now evident in Inferior leads  Nonspecific T wave abnormality, worse in Anterior leads      Pertinent Labs/Diagnostic Test Results:   CT stroke alert brain   Final Result by Neville Gordillo MD (01/10 1339)      -No acute intracranial abnormality.      Findings were  directly discussed with Robel Guillory at 1:35 p.m. on 1/10/2024.         Workstation performed: [id number]         CTA stroke alert (head/neck)   Final Result by Neville Gordillo MD (01/10 1336)      CTA head:   -No large vessel occlusion, high-grade stenosis or aneurysm.      CTA neck:   -No high-grade stenosis, dissection or aneurysm.      Findings were directly discussed with Robel Guillory at 1:35 p.m. on 1/10/2024.                           Workstation performed: [id number]         X-ray chest 1 view portable   ED Interpretation by Chasidy Mares DO (01/10 1346)   No acute abnormalities as interpreted by me independently       Final Result by Micheal Roy MD (01/10 1356)      No acute cardiopulmonary disease.                  Workstation performed: [id number]         MRI Inpatient Order    (Results Pending)     Results from last 7 days   Lab Units 01/10/24  1253   SARS-COV-2  Negative     Results from last 7 days   Lab Units 01/11/24  0325 01/10/24  1253   WBC Thousand/uL 14.16* 15.59*   HEMOGLOBIN g/dL 13.7 14.7   HEMATOCRIT % 41.5 44.2   PLATELETS Thousands/uL 338 367        Results from last 7 days   Lab Units 01/11/24  0325 01/10/24  1346   SODIUM mmol/L 134* 134*   POTASSIUM mmol/L 3.4* 4.2   CHLORIDE mmol/L 103 100   CO2 mmol/L 21 22   ANION GAP mmol/L 10 12   BUN mg/dL 17 15   CREATININE mg/dL 0.80 0.75   EGFR ml/min/1.73sq m 93 101   CALCIUM mg/dL 9.1 9.4        Results from last 7 days   Lab Units 01/11/24  0325 01/10/24  1346   GLUCOSE RANDOM mg/dL 98 105        Results from last 7 days   Lab Units 01/10/24  1253   HEMOGLOBIN A1C % 5.7*   EAG mg/dl 117        Results from last 7 days   Lab Units 01/10/24  1729 01/10/24  1253   HS TNI 0HR ng/L  --  <2   HS TNI 4HR ng/L <2  --         Results from last 7 days   Lab Units 01/10/24  1346   PROTIME seconds 13.1   INR  0.95   PTT seconds 26        Results from last 7 days   Lab Units 01/10/24  1346   PROCALCITONIN ng/ml <0.05        Results from last 7  days   Lab Units 01/10/24  1900   CLARITY UA  Clear   COLOR UA  Yellow   SPEC GRAV UA  1.010   PH UA  7.0   GLUCOSE UA mg/dl Negative   KETONES UA mg/dl 40 (2+)*   BLOOD UA  Negative   PROTEIN UA mg/dl Trace*   NITRITE UA  Negative   BILIRUBIN UA  Negative   UROBILINOGEN UA (BE) mg/dl <2.0   LEUKOCYTES UA  Negative   WBC UA /hpf 0-1   RBC UA /hpf 0-1   BACTERIA UA /hpf None Seen   EPITHELIAL CELLS WET PREP /hpf Occasional     Results from last 7 days   Lab Units 01/10/24  1253   INFLUENZA A PCR  Negative   INFLUENZA B PCR  Negative   RSV PCR  Negative        Results from last 7 days   Lab Units 01/10/24  1740 01/10/24  1730   BLOOD CULTURE  Received in Microbiology Lab. Culture in Progress. Received in Microbiology Lab. Culture in Progress.          Present on Admission:   Hypertension   Mixed hyperlipidemia      Admitting Diagnosis: Numbness [R20.0]  Numbness and tingling [R20.0, R20.2]    Age/Sex: 38 y.o. female    Admission Orders: SCDs; neuro checks; tele monitoring; regular diet    Scheduled Medications:  aspirin, 81 mg, Oral, Daily  atorvastatin, 40 mg, Oral, QPM      Continuous IV Infusions: none     PRN Meds: none       IP CONSULT TO NEUROLOGY  IP CONSULT TO NEUROLOGY  IP CONSULT TO CASE MANAGEMENT  IP CONSULT TO NUTRITION SERVICES    Network Utilization Review Department  ATTENTION: Please call with any questions or concerns to [phone] and carefully listen to the prompts so that you are directed to the right person. All voicemails are confidential.   For Discharge needs, contact Care Management DC Support Team at [phone] opt. 2  Send all requests for admission clinical reviews, approved or denied determinations and any other requests to dedicated fax number below belonging to the campus where the patient is receiving treatment. List of dedicated fax numbers for the Facilities:  FACILITY NAME UR FAX NUMBER   ADMISSION DENIALS (Administrative/Medical Necessity) [phone]   DISCHARGE SUPPORT TEAM  (NETWORK) [phone]   PARENT CHILD HEALTH (Maternity/NICU/Pediatrics) [phone]   [facility] [phone]   [facility] [phone]   [facility] [phone]   [facility] [phone]   [facility] [phone]   [facility] [phone]   [facility] [phone]   [facility] [phone]   [facility] [phone]   [facility] [phone]   [facility] [phone]

## 2024-01-12 VITALS
HEART RATE: 75 BPM | HEIGHT: 60 IN | WEIGHT: 140 LBS | DIASTOLIC BLOOD PRESSURE: 61 MMHG | TEMPERATURE: 98.3 F | BODY MASS INDEX: 27.48 KG/M2 | RESPIRATION RATE: 18 BRPM | OXYGEN SATURATION: 96 % | SYSTOLIC BLOOD PRESSURE: 103 MMHG

## 2024-01-12 LAB
ANION GAP SERPL CALCULATED.3IONS-SCNC: 7 MMOL/L
ATRIAL RATE: 89 BPM
BUN SERPL-MCNC: 18 MG/DL (ref 5–25)
CALCIUM SERPL-MCNC: 8.9 MG/DL (ref 8.4–10.2)
CHLORIDE SERPL-SCNC: 106 MMOL/L (ref 96–108)
CO2 SERPL-SCNC: 23 MMOL/L (ref 21–32)
CREAT SERPL-MCNC: 0.74 MG/DL (ref 0.6–1.3)
ERYTHROCYTE [DISTWIDTH] IN BLOOD BY AUTOMATED COUNT: 12.4 % (ref 11.6–15.1)
GFR SERPL CREATININE-BSD FRML MDRD: 103 ML/MIN/1.73SQ M
GLUCOSE SERPL-MCNC: 102 MG/DL (ref 65–140)
HCT VFR BLD AUTO: 40.1 % (ref 34.8–46.1)
HGB BLD-MCNC: 13.1 G/DL (ref 11.5–15.4)
MCH RBC QN AUTO: 29.5 PG (ref 26.8–34.3)
MCHC RBC AUTO-ENTMCNC: 32.7 G/DL (ref 31.4–37.4)
MCV RBC AUTO: 90 FL (ref 82–98)
P AXIS: 69 DEGREES
PLATELET # BLD AUTO: 334 THOUSANDS/UL (ref 149–390)
PMV BLD AUTO: 9.9 FL (ref 8.9–12.7)
POTASSIUM SERPL-SCNC: 3.9 MMOL/L (ref 3.5–5.3)
PR INTERVAL: 152 MS
QRS AXIS: 80 DEGREES
QRSD INTERVAL: 72 MS
QT INTERVAL: 360 MS
QTC INTERVAL: 438 MS
RBC # BLD AUTO: 4.44 MILLION/UL (ref 3.81–5.12)
SODIUM SERPL-SCNC: 136 MMOL/L (ref 135–147)
T WAVE AXIS: 26 DEGREES
VENTRICULAR RATE: 89 BPM
WBC # BLD AUTO: 11.83 THOUSAND/UL (ref 4.31–10.16)

## 2024-01-12 PROCEDURE — 85027 COMPLETE CBC AUTOMATED: CPT | Performed by: INTERNAL MEDICINE

## 2024-01-12 PROCEDURE — 99213 OFFICE O/P EST LOW 20 MIN: CPT | Performed by: STUDENT IN AN ORGANIZED HEALTH CARE EDUCATION/TRAINING PROGRAM

## 2024-01-12 PROCEDURE — 99239 HOSP IP/OBS DSCHRG MGMT >30: CPT | Performed by: INTERNAL MEDICINE

## 2024-01-12 PROCEDURE — 80048 BASIC METABOLIC PNL TOTAL CA: CPT | Performed by: INTERNAL MEDICINE

## 2024-01-12 RX ADMIN — ASPIRIN 81 MG: 81 TABLET, CHEWABLE ORAL at 08:32

## 2024-01-12 NOTE — PROGRESS NOTES
Progress Note - Neurology   Emily Teague 38 y.o. female MRN: [id number]  Unit/Bed#: -01 Encounter: [id number]      Assessment/Plan     Stroke-like symptoms  Assessment & Plan  Emily Teague is a 38 year old woman with PMHx including HTN, no prior neurological hx, who presented for evaluation of left-sided numbness/tingling. No history of similar symptoms but did have brief episode of transient left arm numbness the day prior to admission. Symptoms have since resolved. She reported partial face, partial arm and partial leg involvement with symptoms. Does not exactly fit well in regard to localization in the CNS (or even the PNS but possible). Lower suspicion for stroke/TIA. No headache reported with symptoms. With age and symptoms, also wanted to assess for demyelinating disease. MRI brain and c-spine with and without contrast without any acute findings or even any white matter disease, no findings to explain presenting symptoms. Possible that symptoms could be related to stress reaction in light of recent increased stress or related to BP in light of hx of HTN. She has since had resolution of symptoms without recurrence, with normal neurologic examination. No noted white matter disease or any acute findings otherwise, lower suspicion at this time for demyelinating disease. No further inpatient neurologic workup at this time.     Plan:  - discontinue stroke pathway  - no need for aspirin or statin from a neurologic perspective  - at least one time follow up for interval evaluation in neurology clinic  - rest of care per primary    Will follow as needed at this time, please reach out with questions in the interim.       Emily Teague will need follow up in in 6 weeks with general attending or advance practitioner. She will not require outpatient neurological testing.    Subjective:   Seen at bedside, appears well. Continues to remain at baseline without recurrence of symptoms. No other issues/concerns noted.      ROS:  Negative except at noted above    Vitals: Blood pressure 129/75, pulse 66, temperature 98.4 °F (36.9 °C), temperature source Oral, resp. rate 16, height 5' (1.524 m), weight 63.5 kg (140 lb), last menstrual period 12/23/2023, SpO2 97%, not currently breastfeeding.,Body mass index is 27.34 kg/m².    Physical Exam:   Gen: no acute distress   CV: s1 and s2 present, RRR  Pulm: clear to auscultation bilaterally  Abd: soft, non-tender, non-distended, bowel sounds present  Ext: no edema    Mental status: Awake, alert and oriented x3. Attention/concentration intact. Naming, repetition, comprehension intact. No dysarthria, no aphasia. Fund of knowledge appropriate.    Cranial nerves: pupils equally round and reactive to light, visual fields full, no nystagmus, extraocular muscles intact, V1 through V3 intact bilaterally and symmetric, face symmetric, hearing intact to finger rub, palate elevation symmetric, tongue was midline, sternocleidomastoid/shoulder shrug strength bilaterally 5/5.    Motor: Normal bulk and tone, no drift, strength 5/5 in bilateral upper and lower extremities.   strength 5/5.  Sensation: Intact to light touch, proprioception, temperature. No neglect.   Coordination: No dysmetria on finger-to-nose and heel-to-shin. No clumsiness.  Reflexes: 2+ in upper and lower extremities, absent Babinski bilaterally  Gait: narrow and steady. No ataxia. Romberg negative    Lab, Imaging and other studies: CBC:   Results from last 7 days   Lab Units 01/12/24  0354 01/11/24  0325 01/10/24  1253   WBC Thousand/uL 11.83* 14.16* 15.59*   RBC Million/uL 4.44 4.62 4.95   HEMOGLOBIN g/dL 13.1 13.7 14.7   HEMATOCRIT % 40.1 41.5 44.2   MCV fL 90 90 89   PLATELETS Thousands/uL 334 338 367   , BMP/CMP:   Results from last 7 days   Lab Units 01/12/24  0354 01/11/24  0325 01/10/24  1346   SODIUM mmol/L 136 134* 134*   POTASSIUM mmol/L 3.9 3.4* 4.2   CHLORIDE mmol/L 106 103 100   CO2 mmol/L 23 21 22   BUN mg/dL 18 17 15    CREATININE mg/dL 0.74 0.80 0.75   CALCIUM mg/dL 8.9 9.1 9.4   EGFR ml/min/1.73sq m 103 93 101   , TSH:   , Urinalysis:   Results from last 7 days   Lab Units 01/10/24  1900   COLOR UA  Yellow   CLARITY UA  Clear   SPEC GRAV UA  1.010   PH UA  7.0   LEUKOCYTES UA  Negative   NITRITE UA  Negative   GLUCOSE UA mg/dl Negative   KETONES UA mg/dl 40 (2+)*   BILIRUBIN UA  Negative   BLOOD UA  Negative     Counseling / Coordination of Care  Total time spent today 15 minutes. Greater than 50% of total time was spent with the patient and / or family counseling and / or coordination of care. A description of the counseling / coordination of care:

## 2024-01-12 NOTE — PROGRESS NOTES
Pastoral Care Progress Note    2024  Patient: Emily Teague : 1985  Admission Date & Time: 1/10/2024 1243  MRN: [id number] CSN: [id number]      Made brief rounding visit w Pt, who was very upbeat, smiling, pleasant, expressed thanks for the visit.  With her consent, prayed for her healing and for wisdom for the staff as they discern treatment decisions.   remains available.

## 2024-01-12 NOTE — CASE MANAGEMENT
Case Management Assessment & Discharge Planning Note    Patient name Emily Teague  Location /-01 MRN [id number]  : 1985 Date 2024       Current Admission Date: 1/10/2024  Current Admission Diagnosis:Stroke-like symptoms   Patient Active Problem List    Diagnosis Date Noted    Stroke-like symptoms 01/10/2024    Leukocytosis 01/10/2024    SIRS (systemic inflammatory response syndrome) (HCC) 01/10/2024    RUQ abdominal pain 10/24/2023    Mixed hyperlipidemia 08/10/2023    Hypertension       LOS (days): 0  Geometric Mean LOS (GMLOS) (days):   Days to GMLOS:     OBJECTIVE:     Current admission status: Observation       Preferred Pharmacy:   [id number] [address]  [address] [facility] [address]  Phone: [phone] Fax: [phone]    [id number] - [place], [place] - [address] [address]  Phone: [phone] Fax: [phone]    Primary Care Provider: JOSE Schulte    Primary Insurance: AETNA  Secondary Insurance:     ASSESSMENT: Late Entry from 24  Active Health Care Proxies    There are no active Health Care Proxies on file.       Obs Notice Signed: 24    Readmission Root Cause  30 Day Readmission: No    Patient Information  Admitted from:: Home  Mental Status: Alert  During Assessment patient was accompanied by: Not accompanied during assessment  Assessment information provided by:: Patient  Primary Caregiver: Self  Support Systems: Self, Spouse/significant other, Family members  County of Residence: [place]  What city do you live in?: [place]  Home entry access options. Select all that apply.: Stairs  Number of steps to enter home.: 1  Do the steps have railings?: No  Type of Current Residence: [address] [place] home  A bedroom is located on the following floor levels of residence (select all that apply):: 2nd Floor  Upon entering residence, is there a bathroom on the main floor (no  further steps)?: Yes  Number of steps to 2nd floor from main floor: One Flight  Living Arrangements: Lives w/ Spouse/significant other, Other (Comment) (Lives with children)  Is patient a ?: No    Activities of Daily Living Prior to Admission  Functional Status: Independent  Completes ADLs independently?: Yes  Ambulates independently?: Yes  Does patient use assisted devices?: No  Does patient currently own DME?: No  Does patient have a history of Outpatient Therapy (PT/OT)?: No  Does the patient have a history of Short-Term Rehab?: No  Does patient have a history of HHC?: No  Does patient currently have HHC?: No         Patient Information Continued  Income Source: Unemployed  Does patient have prescription coverage?: Yes  Does patient receive dialysis treatments?: No  Does patient have a history of substance abuse?: No  Does patient have a history of Mental Health Diagnosis?: No      Means of Transportation  Means of Transport to Appts:: Drives Self      Housing Stability: Low Risk  (1/12/2024)    Housing Stability Vital Sign     Unable to Pay for Housing in the Last Year: No     Number of Places Lived in the Last Year: 1     Unstable Housing in the Last Year: No   Food Insecurity: No Food Insecurity (1/12/2024)    Hunger Vital Sign     Worried About Running Out of Food in the Last Year: Never true     Ran Out of Food in the Last Year: Never true   Transportation Needs: No Transportation Needs (1/12/2024)    PRAPARE - Transportation     Lack of Transportation (Medical): No     Lack of Transportation (Non-Medical): No   Utilities: Not At Risk (1/12/2024)    AHC Utilities     Threatened with loss of utilities: No       DISCHARGE DETAILS:    Discharge planning discussed with:: patient  Freedom of Choice: Yes  Comments - Freedom of Choice: Pt plans to return home, does not anticipate discharge needs    Additional Comments: Met with Pt. Pt presents AA&Ox3. Discussed role of case management. Pt lives with   and children in 2sh, 1 ginger. B/B on 2nd floor. Powder room on 1st floor. Independent PTA. Denies hx of VNA/SNF/MH/D&A. Pt plans to return home and does not anticipate discharge needs. Pt reports her  will transport home. CM to follow.

## 2024-01-13 NOTE — ASSESSMENT & PLAN NOTE
Meets criteria with tachypnea, leukocytosis  No suspicion for infection  RSV, covid, flu negative  Check Pro-Deon  order blood cultures- negative

## 2024-01-13 NOTE — ASSESSMENT & PLAN NOTE
On losartan hydrochlorothiazide 100-25 mg daily  BP acceptable here without med  Will dc and ask her to follow up with PCP

## 2024-01-13 NOTE — DISCHARGE SUMMARY
Formerly [facility], [facility]  Discharge- Emily Teague 1985, 38 y.o. female MRN: [id number]  Unit/Bed#: -01 Encounter: [id number]  Primary Care Provider: JOSE Schulte   Date and time admitted to hospital: 1/10/2024 12:43 PM    SIRS (systemic inflammatory response syndrome) (HCC)  Assessment & Plan  Meets criteria with tachypnea, leukocytosis  No suspicion for infection  RSV, covid, flu negative  Check Pro-Deon  order blood cultures- negative    Leukocytosis  Assessment & Plan  Reactive  Denies symptoms of dysuria, polyuria    Mixed hyperlipidemia  Assessment & Plan  Currently on rosuvastatin 10 mg  Check lipid panel a.m.  Continue with atorvastatin 40    Hypertension  Assessment & Plan  On losartan hydrochlorothiazide 100-25 mg daily  BP acceptable here without med  Will dc and ask her to follow up with PCP    * Stroke-like symptoms  Assessment & Plan  Presenting with left arm, leg, left side of the face tingling and numbness  CT head-no acute intracranial abnormality  CTA head and neck-no occlusion, stenosis, dissection, aneurysm  MRI negtive for stroke  Will need f/u with neurology  DC aspirin  Continue home statin        Medical Problems       Resolved Problems  Date Reviewed: 1/9/2024   None       Discharging Physician / Practitioner: Indiana Tracey MD  PCP: JOSE Schulte  Admission Date:   Admission Orders (From admission, onward)       Ordered        01/10/24 1352  Place in Observation  Once                          Discharge Date: 01/12/24    Consultations During Hospital Stay:  neurology        Significant Findings / Test Results:   MRI cervical spine w wo contrast    Result Date: 1/12/2024  Impression: -No abnormal cervical cord signal or pathologic enhancement. -Mild left C6-C7 neuroforaminal narrowing. No spinal canal narrowing. Workstation performed: [id number]     MRI brain w wo contrast    Result Date: 1/12/2024  Impression: -No acute infarct or other  intracranial abnormality. No pathologic enhancement. -Small left mastoid air effusion with associated mild enhancement, which could be reactive, inflammatory or infectious. Workstation performed: [id number]     X-ray chest 1 view portable    Result Date: 1/10/2024  Impression: No acute cardiopulmonary disease. Workstation performed: [id number]     CT stroke alert brain    Result Date: 1/10/2024  Impression: -No acute intracranial abnormality. Findings were directly discussed with Robel Guillory at 1:35 p.m. on 1/10/2024. Workstation performed: [id number]     CTA stroke alert (head/neck)    Result Date: 1/10/2024  Impression: CTA head: -No large vessel occlusion, high-grade stenosis or aneurysm. CTA neck: -No high-grade stenosis, dissection or aneurysm. Findings were directly discussed with Robel Guillory at 1:35 p.m. on 1/10/2024. Workstation performed: [id number]       No Chest XR results available for this patient.        Reason for Admission: none    Hospital Course:   Emily Teague is a 38 y.o. female patient with PMHx HTN, HLD who originally presented to the hospital on 1/10/2024 due to stroke like symptoms, stroke protocol was initiated. Neurology was consulted. MRI negative for stroke. Neurology recommended atleast one time follow up with Neurology as outpatient. Stressed to the patient about Follow up with Neurology and BP regimen to rvw with PCP        Please see above list of diagnoses and related plan for additional information.     Condition at Discharge: stable    Discharge Day Visit / Exam:   Subjective:  feels better  Vitals: Blood Pressure: 103/61 (01/12/24 1204)  Pulse: 75 (01/12/24 1204)  Temperature: 98.3 °F (36.8 °C) (01/12/24 1204)  Temp Source: Oral (01/12/24 0814)  Respirations: 18 (01/12/24 1204)  Height: 5' (152.4 cm) (01/11/24 0930)  Weight - Scale: 63.5 kg (140 lb) (01/11/24 0930)  SpO2: 96 % (01/12/24 1204)  Exam:   Physical Exam     Gen.-Patient comfortable   Neck- Supple. No thyromegaly  or lymphadenopathy  Lungs-Clear bilaterally without any wheeze or rales   Heart S1-S2, regular rate and rhythm, no murmurs  Abdomen-soft nontender, no organomegaly. Bowel sounds present  Extremities-no cyanosi,  clubbing or edema  Skin- no rash  Neuro-nonfocal     Discussion with Family: Patient declined call to .     Discharge instructions/Information to patient and family:   See after visit summary for information provided to patient and family.      Provisions for Follow-Up Care:  See after visit summary for information related to follow-up care and any pertinent home health orders.      Mobility at time of Discharge:   Basic Mobility Inpatient Raw Score: 24  JH-HLM Goal: 8: Walk 250 feet or more  JH-HLM Achieved: 8: Walk 250 feet ot more  HLM Goal achieved. Continue to encourage appropriate mobility.     Disposition:   Home    Planned Readmission: none     Discharge Statement:  I spent 33 minutes discharging the patient. This time was spent on the day of discharge. I had direct contact with the patient on the day of discharge. Greater than 50% of the total time was spent examining patient, answering all patient questions, arranging and discussing plan of care with patient as well as directly providing post-discharge instructions.  Additional time then spent on discharge activities.    Discharge Medications:  See after visit summary for reconciled discharge medications provided to patient and/or family.      **Please Note: This note may have been constructed using a voice recognition system**

## 2024-01-13 NOTE — ASSESSMENT & PLAN NOTE
Presenting with left arm, leg, left side of the face tingling and numbness  CT head-no acute intracranial abnormality  CTA head and neck-no occlusion, stenosis, dissection, aneurysm  MRI negtive for stroke  Will need f/u with neurology  DC aspirin  Continue home statin

## 2024-01-14 LAB
BACTERIA BLD CULT: NORMAL
BACTERIA BLD CULT: NORMAL

## 2024-01-15 LAB
BACTERIA BLD CULT: NORMAL
BACTERIA BLD CULT: NORMAL

## 2024-02-05 ENCOUNTER — TELEPHONE (OUTPATIENT)
Dept: NEUROLOGY | Facility: CLINIC | Age: 39
End: 2024-02-05

## 2024-02-05 NOTE — TELEPHONE ENCOUNTER
Placed call to confirm patients upcoming appointment, Informed patient of the date/time and location. Advised to call office if they need to reschedule their visit.

## 2024-02-27 ENCOUNTER — TELEPHONE (OUTPATIENT)
Dept: NEUROLOGY | Facility: CLINIC | Age: 39
End: 2024-02-27

## 2024-03-06 ENCOUNTER — OFFICE VISIT (OUTPATIENT)
Dept: NEUROLOGY | Facility: CLINIC | Age: 39
End: 2024-03-06
Payer: COMMERCIAL

## 2024-03-06 VITALS
HEIGHT: 60 IN | SYSTOLIC BLOOD PRESSURE: 150 MMHG | WEIGHT: 145 LBS | DIASTOLIC BLOOD PRESSURE: 100 MMHG | HEART RATE: 84 BPM | RESPIRATION RATE: 18 BRPM | TEMPERATURE: 98 F | BODY MASS INDEX: 28.47 KG/M2 | OXYGEN SATURATION: 98 %

## 2024-03-06 DIAGNOSIS — R29.90 STROKE-LIKE SYMPTOMS: Primary | ICD-10-CM

## 2024-03-06 DIAGNOSIS — R20.0 NUMBNESS AND TINGLING: ICD-10-CM

## 2024-03-06 DIAGNOSIS — R20.2 NUMBNESS AND TINGLING: ICD-10-CM

## 2024-03-06 PROCEDURE — 99215 OFFICE O/P EST HI 40 MIN: CPT | Performed by: PSYCHIATRY & NEUROLOGY

## 2024-03-06 NOTE — PROGRESS NOTES
Patient ID: Emily Teague is a 38 y.o. female.    Assessment/Plan:    Stroke-like symptoms  Patient is a 38-year-old female with history of hypertension and hyperlipidemia who presents as a hospital follow-up for strokelike symptoms.  Patient was seen on 1/10/2024.    Patient is a  and around 10 AM she had taking her HCTZ-losartan.  At around 11 AM she started feeling left-sided numbness/tingling.  Symptoms were preceded by a sensation of coldness in her hands.  Distribution of her left-sided tingling is patchy.  She reports numbness in left V2 and V3 distribution, her left forearm and her left distal lower extremity.  Both episodes were very brief in duration.  Patient stated that this had happened the Monday prior as well but she did not alert anybody and just fell asleep..  When this recurred on Wednesday she became concerned and presented to the hospital.  Stroke and MS workup was normal. Patient does report that around that time she had increased stress given that her sister had moved in with her and she had an increased workload.  Her symptoms have not recurred since that time.    At this time, I am not concerned about a demyelinating disorder.  Her workup with stroke was negative and I have low suspicion given her young age.  Symptoms could have possibly been due to hypokalemia as her potassium was 3.4 versus a stress reaction.  Not overly concerned given reported location of numbness and tingling does not fit a clear vascular distribution.  I recommend that patient keep an eye on her blood pressure and she should correlate symptom onset with her blood pressure.  I am okay with following patient as needed however we will see her again in 6 months just to ensure that she has remained stable.       Diagnoses and all orders for this visit:    Stroke-like symptoms    Numbness and tingling  -     Ambulatory referral to Neurology  -     Vitamin B12; Future       Subjective:    HPI    Patient is a  38-year-old female with history of hypertension and hyperlipidemia who presents as a hospital follow-up for strokelike symptoms.  Patient was seen on 1/10/2024.    Patient is a  and around 10 AM she had taking her HCTZ-losartan.  At around 11 AM she started feeling left-sided numbness/tingling.  Symptoms were preceded by a sensation of coldness in her hands.  Distribution of her left-sided tingling is patchy.  She reports numbness in left V2 and V3 distribution, her left forearm and her left distal lower extremity.  Both episodes were very brief in duration.  Patient stated that this had happened the Monday prior as well but she did not alert anybody and just fell asleep..  When this recurred on Wednesday she became concerned and presented to the hospital.    Has baseline lisp   Stress recently given her sister moved in with her and she had increased workload   No OCPs    Workup:  CT head without any acute abnormalities    CTA without any large vessel occlusion, high-grade stenosis or aneurysm of the head and neck.  Normal middle cerebral arteries.    MRI brain with and without contrast: No acute infarct or other intracranial abnormality. No pathologic enhancement. Small left mastoid air effusion with associated mild enhancement, which could be reactive, inflammatory or infectious.    MRI C-spine with and without contrast: No abnormal cervical cord signal or pathologic enhancement. Mild left C6-C7 neuroforaminal narrowing. No spinal canal narrowing.    LDL 78   HBA1C 5.7   Echo: EF 60% normal LA  K 3.4       The following portions of the patient's history were reviewed and updated as appropriate: allergies, current medications, past family history, past medical history, past social history, past surgical history, and problem list and review of systems.         Objective:    Blood pressure 150/100, pulse 84, temperature 98 °F (36.7 °C), temperature source Temporal, resp. rate 18, height 5' (1.524 m), weight  65.8 kg (145 lb), SpO2 98%, not currently breastfeeding.    Physical Exam  Eyes:      General: Lids are normal.      Extraocular Movements: Extraocular movements intact.      Pupils: Pupils are equal, round, and reactive to light.   Neurological:      Cranial Nerves: Dysarthria present.      Motor: Motor strength is normal.     Deep Tendon Reflexes:      Reflex Scores:       Bicep reflexes are 2+ on the right side and 2+ on the left side.       Brachioradialis reflexes are 2+ on the right side and 2+ on the left side.       Patellar reflexes are 2+ on the right side and 2+ on the left side.       Achilles reflexes are 2+ on the right side and 2+ on the left side.        Neurological Exam  Mental Status  Awake, alert and oriented to person, place and time. dysarthria present.  Lisp which is baseline .    Cranial Nerves  CN II: Visual fields full to confrontation.  CN III, IV, VI: Extraocular movements intact bilaterally. Normal lids and orbits bilaterally. Pupils equal round and reactive to light bilaterally.  CN V: Facial sensation is normal.  CN VII: Full and symmetric facial movement.  CN VIII: Hearing is normal.  CN IX, X: Palate elevates symmetrically  CN XI: Shoulder shrug strength is normal.  CN XII: Tongue midline without atrophy or fasciculations.    Motor   Strength is 5/5 throughout all four extremities.    Sensory  Light touch is normal in upper and lower extremities. Temperature is normal in upper and lower extremities. Vibration is normal in upper and lower extremities.     Reflexes                                            Right                      Left  Brachioradialis                    2+                         2+  Biceps                                 2+                         2+  Patellar                                2+                         2+  Achilles                                2+                         2+    Coordination  Right: Finger-to-nose normal.Left: Finger-to-nose  normal.    Gait  Casual gait is normal including stance, stride, and arm swing.        ROS:    Review of Systems    Review of Systems   Constitutional:  Negative for appetite change, fatigue and fever.   HENT: Negative.  Negative for hearing loss, tinnitus, trouble swallowing and voice change.    Eyes: Negative.  Negative for photophobia, pain and visual disturbance.   Respiratory: Negative.  Negative for shortness of breath.    Cardiovascular: Negative.  Negative for palpitations.   Gastrointestinal: Negative.  Negative for nausea and vomiting.   Endocrine: Negative.  Negative for cold intolerance.   Genitourinary: Negative.  Negative for dysuria, frequency and urgency.   Musculoskeletal:  Negative for back pain, gait problem, myalgias, neck pain and neck stiffness.   Skin: Negative.  Negative for rash.   Allergic/Immunologic: Negative.    Neurological: Negative.  Negative for dizziness, tremors, seizures, syncope, facial asymmetry, speech difficulty, weakness, light-headedness, numbness and headaches.   Hematological: Negative.  Does not bruise/bleed easily.   Psychiatric/Behavioral: Negative.  Negative for confusion, hallucinations and sleep disturbance.    All other systems reviewed and are negative.

## 2024-03-17 NOTE — ASSESSMENT & PLAN NOTE
Patient is a 38-year-old female with history of hypertension and hyperlipidemia who presents as a hospital follow-up for strokelike symptoms.  Patient was seen on 1/10/2024.    Patient is a  and around 10 AM she had taking her HCTZ-losartan.  At around 11 AM she started feeling left-sided numbness/tingling.  Symptoms were preceded by a sensation of coldness in her hands.  Distribution of her left-sided tingling is patchy.  She reports numbness in left V2 and V3 distribution, her left forearm and her left distal lower extremity.  Both episodes were very brief in duration.  Patient stated that this had happened the Monday prior as well but she did not alert anybody and just fell asleep..  When this recurred on Wednesday she became concerned and presented to the hospital.  Stroke and MS workup was normal. Patient does report that around that time she had increased stress given that her sister had moved in with her and she had an increased workload.  Her symptoms have not recurred since that time.    At this time, I am not concerned about a demyelinating disorder.  Her workup with stroke was negative and I have low suspicion given her young age.  Symptoms could have possibly been due to hypokalemia as her potassium was 3.4 versus a stress reaction.  Not overly concerned given reported location of numbness and tingling does not fit a clear vascular distribution.  I recommend that patient keep an eye on her blood pressure and she should correlate symptom onset with her blood pressure.  I am okay with following patient as needed however we will see her again in 6 months just to ensure that she has remained stable.

## 2024-03-25 ENCOUNTER — OFFICE VISIT (OUTPATIENT)
Dept: URGENT CARE | Facility: CLINIC | Age: 39
End: 2024-03-25
Payer: COMMERCIAL

## 2024-03-25 VITALS
DIASTOLIC BLOOD PRESSURE: 100 MMHG | RESPIRATION RATE: 16 BRPM | BODY MASS INDEX: 27.93 KG/M2 | SYSTOLIC BLOOD PRESSURE: 152 MMHG | OXYGEN SATURATION: 99 % | TEMPERATURE: 97.9 F | HEART RATE: 88 BPM | WEIGHT: 143 LBS

## 2024-03-25 DIAGNOSIS — B97.89 VIRAL SINUSITIS: Primary | ICD-10-CM

## 2024-03-25 DIAGNOSIS — R03.0 ELEVATED BLOOD PRESSURE READING: ICD-10-CM

## 2024-03-25 DIAGNOSIS — J32.9 VIRAL SINUSITIS: Primary | ICD-10-CM

## 2024-03-25 PROCEDURE — G0381 LEV 2 HOSP TYPE B ED VISIT: HCPCS | Performed by: PHYSICIAN ASSISTANT

## 2024-03-25 NOTE — PATIENT INSTRUCTIONS
For sinus congestion and cough try Coricidin HBP.  May also use Flonase or Nasonex - 2 sprays in each nostril for 1 week.   Be sure to follow up with PCP regarding elevated blood pressure.   Follow up with PCP in 3-5 days.  Proceed to  ER if symptoms worsen.    If tests have been performed at [facility], our office will contact you with results if changes need to be made to the care plan discussed with you at the visit.  You can review your full results on [facility] MyChart.

## 2024-03-25 NOTE — PROGRESS NOTES
[facility]        NAME: Emily Teague is a 38 y.o. female  : 1985    MRN: [id number]  DATE: 2024  TIME: 11:14 AM    Assessment and Plan   Viral sinusitis [J32.9, B97.89]  1. Viral sinusitis        2. Elevated blood pressure reading              Patient Instructions   For sinus congestion and cough try Coricidin HBP.  May also use Flonase or Nasonex - 2 sprays in each nostril for 1 week.   Be sure to follow up with PCP regarding elevated blood pressure.   Follow up with PCP in 3-5 days.  Proceed to  ER if symptoms worsen.    If tests have been performed at [facility], our office will contact you with results if changes need to be made to the care plan discussed with you at the visit.  You can review your full results on [facility] MyChart.    Chief Complaint     Chief Complaint   Patient presents with    Cold Like Symptoms     Pt reports 3 days of fever, cough, sinus pressure/pain and body aches. Tmax 101. Taking tylenol/ibuprofen.          History of Present Illness       Sinusitis  This is a new problem. Episode onset: 3 days ago. The problem is unchanged. The maximum temperature recorded prior to her arrival was 101 - 101.9 F. Associated symptoms include congestion, coughing, sinus pressure and a sore throat. Pertinent negatives include no chills, ear pain, headaches or shortness of breath. Past treatments include nothing.   She reports significant facial pain/congestion yesterday which is improved today.  ST initially which has resolved.  She denies CP/SOB/N/V/D.   PMH: hypercholesterolemia, reports h/o Htn although medication was stopped by PCP   Review of Systems   Review of Systems   Constitutional:  Positive for fever. Negative for chills.   HENT:  Positive for congestion, sinus pressure and sore throat. Negative for ear pain.    Eyes:  Negative for pain and visual disturbance.   Respiratory:  Positive for cough. Negative for shortness of breath.    Cardiovascular:  Negative for  chest pain and palpitations.   Gastrointestinal:  Negative for abdominal pain and vomiting.   Genitourinary:  Negative for dysuria and hematuria.   Musculoskeletal:  Negative for arthralgias and back pain.   Skin:  Negative for color change and rash.   Neurological:  Negative for seizures, syncope and headaches.   All other systems reviewed and are negative.        Current Medications       Current Outpatient Medications:     rosuvastatin (CRESTOR) 10 MG tablet, Take 1 tablet (10 mg total) by mouth daily, Disp: 90 tablet, Rfl: 1    Current Allergies     Allergies as of 2024 - Reviewed 2024   Allergen Reaction Noted    Amoxicillin Rash 10/12/2018            The following portions of the patient's history were reviewed and updated as appropriate: allergies, current medications, past family history, past medical history, past social history, past surgical history and problem list.     Past Medical History:   Diagnosis Date    Hypertension        Past Surgical History:   Procedure Laterality Date     SECTION         Family History   Problem Relation Age of Onset    Hypertension Mother     Diabetes Mother     Hypertension Father     Diabetes Father     Colon polyps Neg Hx     Colon cancer Neg Hx          Medications have been verified.        Objective   /100   Pulse 88   Temp 97.9 °F (36.6 °C)   Resp 16   Wt 64.9 kg (143 lb)   SpO2 99%   BMI 27.93 kg/m²   No LMP recorded.       Physical Exam     Physical Exam  Vitals and nursing note reviewed.   Constitutional:       General: She is not in acute distress.     Appearance: Normal appearance.   HENT:      Head: Normocephalic and atraumatic.      Right Ear: Tympanic membrane and ear canal normal.      Left Ear: Tympanic membrane and ear canal normal.      Nose: Congestion present.      Comments: Bilat turbinates with edema and erythema.  No tenderness over frontal or maxillary sinuses.      Mouth/Throat:      Mouth: Mucous membranes are  moist.      Pharynx: No oropharyngeal exudate or posterior oropharyngeal erythema.   Eyes:      Conjunctiva/sclera: Conjunctivae normal.   Cardiovascular:      Rate and Rhythm: Normal rate and regular rhythm.      Pulses: Normal pulses.      Heart sounds: Normal heart sounds.   Pulmonary:      Effort: Pulmonary effort is normal.      Breath sounds: Normal breath sounds. No wheezing, rhonchi or rales.   Lymphadenopathy:      Cervical: No cervical adenopathy.   Skin:     General: Skin is warm and dry.   Neurological:      Mental Status: She is alert and oriented to person, place, and time.   Psychiatric:         Mood and Affect: Mood normal.         Behavior: Behavior normal.

## 2024-07-11 ENCOUNTER — OFFICE VISIT (OUTPATIENT)
Dept: FAMILY MEDICINE CLINIC | Facility: CLINIC | Age: 39
End: 2024-07-11
Payer: COMMERCIAL

## 2024-07-11 VITALS
DIASTOLIC BLOOD PRESSURE: 84 MMHG | HEART RATE: 81 BPM | RESPIRATION RATE: 17 BRPM | OXYGEN SATURATION: 100 % | WEIGHT: 146 LBS | TEMPERATURE: 98 F | HEIGHT: 61 IN | SYSTOLIC BLOOD PRESSURE: 136 MMHG | BODY MASS INDEX: 27.56 KG/M2

## 2024-07-11 DIAGNOSIS — Z00.00 ANNUAL PHYSICAL EXAM: Primary | ICD-10-CM

## 2024-07-11 DIAGNOSIS — I10 PRIMARY HYPERTENSION: ICD-10-CM

## 2024-07-11 DIAGNOSIS — E78.41 ELEVATED LIPOPROTEIN(A): ICD-10-CM

## 2024-07-11 DIAGNOSIS — Z11.59 NEED FOR HEPATITIS C SCREENING TEST: ICD-10-CM

## 2024-07-11 PROCEDURE — 99395 PREV VISIT EST AGE 18-39: CPT | Performed by: FAMILY MEDICINE

## 2024-07-11 RX ORDER — ROSUVASTATIN CALCIUM 10 MG/1
10 TABLET, COATED ORAL DAILY
Qty: 90 TABLET | Refills: 1 | Status: SHIPPED | OUTPATIENT
Start: 2024-07-11

## 2024-07-11 NOTE — PATIENT INSTRUCTIONS
"Patient Education     Routine physical for adults   The Basics   Written by the doctors and editors at [facility]   What is a physical? -- A physical is a routine visit, or \"check-up,\" with your doctor. You might also hear it called a \"wellness visit\" or \"preventive visit.\"  During each visit, the doctor will:   Ask about your physical and mental health   Ask about your habits, behaviors, and lifestyle   Do an exam   Give you vaccines if needed   Talk to you about any medicines you take   Give advice about your health   Answer your questions  Getting regular check-ups is an important part of taking care of your health. It can help your doctor find and treat any problems you have. But it's also important for preventing health problems.  A routine physical is different from a \"sick visit.\" A sick visit is when you see a doctor because of a health concern or problem. Since physicals are scheduled ahead of time, you can think about what you want to ask the doctor.  How often should I get a physical? -- It depends on your age and health. In general, for people age 21 years and older:   If you are younger than 50 years, you might be able to get a physical every 3 years.   If you are 50 years or older, your doctor might recommend a physical every year.  If you have an ongoing health condition, like diabetes or high blood pressure, your doctor will probably want to see you more often.  What happens during a physical? -- In general, each visit will include:   Physical exam - The doctor or nurse will check your height, weight, heart rate, and blood pressure. They will also look at your eyes and ears. They will ask about how you are feeling and whether you have any symptoms that bother you.   Medicines - It's a good idea to bring a list of all the medicines you take to each doctor visit. Your doctor will talk to you about your medicines and answer any questions. Tell them if you are having any side effects that bother you. You " "should also tell them if you are having trouble paying for any of your medicines.   Habits and behaviors - This includes:   Your diet   Your exercise habits   Whether you smoke, drink alcohol, or use drugs   Whether you are sexually active   Whether you feel safe at home  Your doctor will talk to you about things you can do to improve your health and lower your risk of health problems. They will also offer help and support. For example, if you want to quit smoking, they can give you advice and might prescribe medicines. If you want to improve your diet or get more physical activity, they can help you with this, too.   Lab tests, if needed - The tests you get will depend on your age and situation. For example, your doctor might want to check your:   Cholesterol   Blood sugar   Iron level   Vaccines - The recommended vaccines will depend on your age, health, and what vaccines you already had. Vaccines are very important because they can prevent certain serious or deadly infections.   Discussion of screening - \"Screening\" means checking for diseases or other health problems before they cause symptoms. Your doctor can recommend screening based on your age, risk, and preferences. This might include tests to check for:   Cancer, such as breast, prostate, cervical, ovarian, colorectal, prostate, lung, or skin cancer   Sexually transmitted infections, such as chlamydia and gonorrhea   Mental health conditions like depression and anxiety  Your doctor will talk to you about the different types of screening tests. They can help you decide which screenings to have. They can also explain what the results might mean.   Answering questions - The physical is a good time to ask the doctor or nurse questions about your health. If needed, they can refer you to other doctors or specialists, too.  Adults older than 65 years often need other care, too. As you get older, your doctor will talk to you about:   How to prevent falling at " home   Hearing or vision tests   Memory testing   How to take your medicines safely   Making sure that you have the help and support you need at home  All topics are updated as new evidence becomes available and our peer review process is complete.  This topic retrieved from [employer] on: May 02, 2024.  Topic 139528 Version 1.0  Release: 32.4.3 - C32.122  © 2024 UpToDate, Inc. and/or its affiliates. All rights reserved.  Consumer Information Use and Disclaimer   Disclaimer: This generalized information is a limited summary of diagnosis, treatment, and/or medication information. It is not meant to be comprehensive and should be used as a tool to help the user understand and/or assess potential diagnostic and treatment options. It does NOT include all information about conditions, treatments, medications, side effects, or risks that may apply to a specific patient. It is not intended to be medical advice or a substitute for the medical advice, diagnosis, or treatment of a health care provider based on the health care provider's examination and assessment of a patient's specific and unique circumstances. Patients must speak with a health care provider for complete information about their health, medical questions, and treatment options, including any risks or benefits regarding use of medications. This information does not endorse any treatments or medications as safe, effective, or approved for treating a specific patient. UpToDate, Inc. and its affiliates disclaim any warranty or liability relating to this information or the use thereof.The use of this information is governed by the Terms of Use, available at https://www.[employer].com/en/know/clinical-effectiveness-terms. 2024© UpToDate, Inc. and its affiliates and/or licensors. All rights reserved.  Copyright   © 2024 UpToDate, Inc. and/or its affiliates. All rights reserved.

## 2024-07-11 NOTE — PROGRESS NOTES
Adult Annual Physical  Name: Emily Teague      : 1985      MRN: [id number]  Encounter Provider: Brianna Scott DO  Encounter Date: 2024   Encounter department: [facility]    Assessment & Plan   1. Annual physical exam  -     Lipid panel; Future  -     Comprehensive metabolic panel; Future  -     CBC and differential; Future  -     TSH, 3rd generation with Free T4 reflex; Future  -     UA (URINE) with reflex to Scope; Future  2. Elevated lipoprotein(a)  -     rosuvastatin (CRESTOR) 10 MG tablet; Take 1 tablet (10 mg total) by mouth daily  3. Need for hepatitis C screening test  -     Hepatitis C Antibody; Future  4. Primary hypertension  -     Lipid panel; Future  -     Comprehensive metabolic panel; Future  -     CBC and differential; Future  -     TSH, 3rd generation with Free T4 reflex; Future  -     UA (URINE) with reflex to Scope; Future    Immunizations and preventive care screenings were discussed with patient today. Appropriate education was printed on patient's after visit summary.    Counseling:  Alcohol/drug use: discussed moderation in alcohol intake, the recommendations for healthy alcohol use, and avoidance of illicit drug use.  Dental Health: discussed importance of regular tooth brushing, flossing, and dental visits.  Injury prevention: discussed safety/seat belts, safety helmets, smoke detectors, carbon dioxide detectors, and smoking near bedding or upholstery.  Sexual health: discussed sexually transmitted diseases, partner selection, use of condoms, avoidance of unintended pregnancy, and contraceptive alternatives.  Exercise: the importance of regular exercise/physical activity was discussed. Recommend exercise 3-5 times per week for at least 30 minutes.          History of Present Illness     Adult Annual Physical:  Patient presents for annual physical.     Diet and Physical Activity:  - Diet/Nutrition: well balanced diet.  - Exercise:  walking.    Depression Screening:  - PHQ-2 Score: 0    General Health:  - Sleep: sleeps well.  - Hearing: normal hearing bilateral ears.  - Vision: no vision problems.  - Dental: regular dental visits.    /GYN Health:  - Follows with GYN: yes.     Review of Systems   Constitutional:  Negative for chills, fatigue and fever.   HENT:  Negative for congestion, postnasal drip, rhinorrhea and sinus pressure.    Eyes:  Negative for photophobia and visual disturbance.   Respiratory:  Negative for cough and shortness of breath.    Cardiovascular:  Negative for chest pain, palpitations and leg swelling.   Gastrointestinal:  Negative for abdominal pain, constipation, diarrhea, nausea and vomiting.   Genitourinary:  Negative for difficulty urinating and dysuria.   Musculoskeletal:  Negative for arthralgias and myalgias.   Skin:  Negative for color change and rash.   Neurological:  Negative for dizziness, weakness, light-headedness and headaches.     Pertinent Medical History     Medical History Reviewed by provider this encounter:  Tobacco  Allergies  Meds  Problems  Med Hx  Surg Hx  Fam Hx       Past Medical History   Past Medical History:   Diagnosis Date    Hypertension      Past Surgical History:   Procedure Laterality Date     SECTION       Family History   Problem Relation Age of Onset    Hypertension Mother     Diabetes Mother     Hypertension Father     Diabetes Father     Colon polyps Neg Hx     Colon cancer Neg Hx      Current Outpatient Medications on File Prior to Visit   Medication Sig Dispense Refill    [DISCONTINUED] rosuvastatin (CRESTOR) 10 MG tablet Take 1 tablet (10 mg total) by mouth daily 90 tablet 1     No current facility-administered medications on file prior to visit.     Allergies   Allergen Reactions    Amoxicillin Rash      Current Outpatient Medications on File Prior to Visit   Medication Sig Dispense Refill    [DISCONTINUED] rosuvastatin (CRESTOR) 10 MG tablet Take 1 tablet (10 mg  "total) by mouth daily 90 tablet 1     No current facility-administered medications on file prior to visit.      Social History     Tobacco Use    Smoking status: Former    Smokeless tobacco: Never   Vaping Use    Vaping status: Never Used   Substance and Sexual Activity    Alcohol use: Never     Comment: Social     Drug use: Never    Sexual activity: Yes     Partners: Male     Birth control/protection: None       Objective     /84 (BP Location: Left arm, Patient Position: Sitting, Cuff Size: Standard)   Pulse 81   Temp 98 °F (36.7 °C) (Tympanic)   Resp 17   Ht 5' 0.7\" (1.542 m)   Wt 66.2 kg (146 lb)   SpO2 100%   BMI 27.86 kg/m²     Physical Exam  Constitutional:       General: She is not in acute distress.     Appearance: Normal appearance. She is not ill-appearing, toxic-appearing or diaphoretic.   HENT:      Head: Normocephalic and atraumatic.      Right Ear: Tympanic membrane and ear canal normal.      Left Ear: Tympanic membrane and ear canal normal.      Nose: Nose normal. No congestion.      Mouth/Throat:      Mouth: Mucous membranes are moist.      Pharynx: Oropharynx is clear. No oropharyngeal exudate.   Eyes:      Extraocular Movements: Extraocular movements intact.      Conjunctiva/sclera: Conjunctivae normal.      Pupils: Pupils are equal, round, and reactive to light.   Cardiovascular:      Rate and Rhythm: Normal rate and regular rhythm.      Pulses: Normal pulses.      Heart sounds: No murmur heard.  Pulmonary:      Effort: Pulmonary effort is normal.      Breath sounds: Normal breath sounds. No wheezing, rhonchi or rales.   Abdominal:      General: Bowel sounds are normal. There is no distension.      Palpations: Abdomen is soft.      Tenderness: There is no abdominal tenderness.   Musculoskeletal:         General: No swelling or tenderness. Normal range of motion.      Cervical back: Normal range of motion and neck supple.   Skin:     General: Skin is warm and dry.      Capillary " Refill: Capillary refill takes less than 2 seconds.   Neurological:      General: No focal deficit present.      Mental Status: She is alert and oriented to person, place, and time.      Cranial Nerves: No cranial nerve deficit.   Psychiatric:         Mood and Affect: Mood normal.         Behavior: Behavior normal.         Thought Content: Thought content normal.

## 2024-08-14 ENCOUNTER — NURSE TRIAGE (OUTPATIENT)
Age: 39
End: 2024-08-14

## 2024-08-14 NOTE — TELEPHONE ENCOUNTER
" # [id number] Sorin    Call to pt. She reports her period started 8/12/24. Has been mild/moderate, changing a pad every few hours. States she was in the shower about an hour ago, saw a lot of blood and two clots came out. One clot was small, the other slightly larger than a quarter but it was long and not round. She became dizzy after seeing the blood and had to lay down. States she is feeling better at this time. Denies any pain. Encouraged pt to continue to monitor and call back if bleeding through a pad in less than an hour, passing larger clots, having severe pain or dizziness/lightheadedness. Also advised to increase hydration. Pt verbalized understanding and is thankful.       Reason for Disposition   Normal menstrual flow    Answer Assessment - Initial Assessment Questions  1. AMOUNT: \"Describe the bleeding that you are having.\"     - SPOTTING: spotting, or pinkish / brownish mucous discharge; does not fill panti-liner or pad     - MILD:  less than 1 pad / hour; less than patient's usual menstrual bleeding    - MODERATE: 1-2 pads / hour; 1 menstrual cup every 6 hours; small-medium blood clots (e.g., pea, grape, small coin)    - SEVERE: soaking 2 or more pads/hour for 2 or more hours; 1 menstrual cup every 2 hours; bleeding not contained by pads or continuous red blood from vagina; large blood clots (e.g., golf ball, large coin)       moderate  2. ONSET: \"When did the bleeding begin?\" \"Is it continuing now?\"      8/12/24  3. MENSTRUAL PERIOD: \"When was the last normal menstrual period?\" \"How is this different than your period?\"      7/14/24  4. REGULARITY: \"How regular are your periods?\"      regular  5. ABDOMINAL PAIN: \"Do you have any pain?\" \"How bad is the pain?\"  (e.g., Scale 1-10; mild, moderate, or severe)    - MILD (1-3): doesn't interfere with normal activities, abdomen soft and not tender to touch     - MODERATE (4-7): interferes with normal activities or awakens from sleep, tender to touch     " "- SEVERE (8-10): excruciating pain, doubled over, unable to do any normal activities       denies  6. PREGNANCY: \"Could you be pregnant?\" \"Are you sexually active?\" \"Did you recently give birth?\"      denies  7. BREASTFEEDING: \"Are you breastfeeding?\"      denies  8. HORMONES: \"Are you taking any hormone medications, prescription or OTC?\" (e.g., birth control pills, estrogen)      denies  9. BLOOD THINNERS: \"Do you take any blood thinners?\" (e.g., Coumadin/warfarin, Pradaxa/dabigatran, aspirin)      denies  10. CAUSE: \"What do you think is causing the bleeding?\" (e.g., recent gyn surgery, recent gyn procedure; known bleeding disorder, cervical cancer, polycystic ovarian disease, fibroids)          unsure  11. HEMODYNAMIC STATUS: \"Are you weak or feeling lightheaded?\" If Yes, ask: \"Can you stand and walk normally?\"         Felt dizzy after seeing blood in the shower, feels better at this time  12. OTHER SYMPTOMS: \"What other symptoms are you having with the bleeding?\" (e.g., passed tissue, vaginal discharge, fever, menstrual-type cramps)        Denies    Protocols used: Vaginal Bleeding - Abnormal-ADULT-[place]    "

## 2024-08-14 NOTE — TELEPHONE ENCOUNTER
Regarding: heavy vaginal bleeding, dizziness  ----- Message from Neli SMITH sent at 8/14/2024  3:20 PM EDT -----  Pt called reporting she began her period on Monday, however, began heavily bleeding today, which is not her normal cycle. Pt also reports dizziness to the point she has to lay down. She is a pt of [facility]. She will require a .

## 2024-09-09 ENCOUNTER — ANNUAL EXAM (OUTPATIENT)
Dept: GYNECOLOGY | Facility: CLINIC | Age: 39
End: 2024-09-09
Payer: COMMERCIAL

## 2024-09-09 VITALS
SYSTOLIC BLOOD PRESSURE: 136 MMHG | HEIGHT: 60 IN | BODY MASS INDEX: 28.07 KG/M2 | WEIGHT: 143 LBS | DIASTOLIC BLOOD PRESSURE: 80 MMHG

## 2024-09-09 DIAGNOSIS — Z01.419 ENCOUNTER FOR ANNUAL ROUTINE GYNECOLOGICAL EXAMINATION: Primary | ICD-10-CM

## 2024-09-09 PROCEDURE — G0145 SCR C/V CYTO,THINLAYER,RESCR: HCPCS | Performed by: OBSTETRICS & GYNECOLOGY

## 2024-09-09 PROCEDURE — 99395 PREV VISIT EST AGE 18-39: CPT | Performed by: OBSTETRICS & GYNECOLOGY

## 2024-09-09 NOTE — PROGRESS NOTES
Ambulatory Visit  Name: Emily Teague      : 1985      MRN: [id number]  Encounter Provider: Brittney Bowen DO  Encounter Date: 2024   Encounter department: [facility]    Assessment & Plan   Pap with reflex done     Discussed self breast exams    She will keep track of her menstrual cycles.  This was a very brief episode.  If it continues to happen or she bleeds heavier for longer periods of time, she will let us know.    History of Essure-this was not noted on ultrasound however the radiologist said that sometimes it is because of the position of the uterus and recommended an x-ray.  She did not have this done but is interested in doing so.  The order was reprinted for her    discussed preventive care, regular exercise and a healthy diet      History of Present Illness     Emily Teague is a 38 y.o. female who presents for yearly.  Last month, she had a brief episode of heavy bleeding with blood clots, it lasted about 30 minutes but took her by surprise.  Typically, she bleeds for 4 to 5 days and it is not heavy.  She is due for her cycle now.    She had an Essure done about 10 years ago and had a follow-up HSG.  She recently had a few ultrasounds and the Essure was not noted.  I spoke to the radiologist who said it could be due to positioning of her uterus and recommended an ultrasound.  I ordered this last year but she did not have it done.  She would like to do it now.    Normal Pap, negative HPV in   Review of Systems   Constitutional: Negative.    Gastrointestinal: Negative.    Genitourinary: Negative.        Objective     There were no vitals taken for this visit.    Physical Exam  Vitals and nursing note reviewed. Exam conducted with a chaperone present.   Constitutional:       Appearance: She is well-developed.   HENT:      Head: Normocephalic.   Neck:      Thyroid: No thyromegaly.   Cardiovascular:      Rate and Rhythm: Normal rate and regular rhythm.    Pulmonary:      Effort: Pulmonary effort is normal.      Breath sounds: Normal breath sounds.   Chest:   Breasts:     Right: Normal.      Left: Normal.      Comments: Examined seated and supine  Abdominal:      Palpations: Abdomen is soft.   Genitourinary:     Vagina: Normal.      Cervix: Normal.      Uterus: Normal.       Adnexa: Right adnexa normal and left adnexa normal.      Comments: Small amount of brown blood noted  White area at 12:00 of cervix, I attempted to wipe this away with a swab and it persisted, Pap done and then no longer appeared to be there.  Musculoskeletal:      Cervical back: Neck supple.   Skin:     General: Skin is warm and dry.   Neurological:      Mental Status: She is alert.   Psychiatric:         Mood and Affect: Mood normal.       Administrative Statements

## 2024-09-16 LAB
LAB AP GYN PRIMARY INTERPRETATION: NORMAL
LAB AP LMP: NORMAL
Lab: NORMAL

## 2024-11-06 DIAGNOSIS — E78.41 ELEVATED LIPOPROTEIN(A): ICD-10-CM

## 2024-11-06 NOTE — TELEPHONE ENCOUNTER
Patient stated she went to  remaining refill and was told that the script was cancelled and a new script is needed.     Reason for call:   [x] Refill   [] Prior Auth  [] Other:     Office:   [x] PCP/Provider -   [] Specialty/Provider -     Medication: rosuvastatin (CRESTOR) 10 MG     Dose/Frequency: Take 1 tablet (10 mg total) by mouth daily     Quantity: 90    Pharmacy: [facility]/pharmacy #[id number] [address]     Does the patient have enough for 3 days?   [x] Yes   [] No - Send as HP to POD

## 2024-11-07 RX ORDER — ROSUVASTATIN CALCIUM 10 MG/1
10 TABLET, COATED ORAL DAILY
Qty: 90 TABLET | Refills: 1 | Status: SHIPPED | OUTPATIENT
Start: 2024-11-07

## 2025-07-28 ENCOUNTER — NURSE TRIAGE (OUTPATIENT)
Age: 40
End: 2025-07-28

## 2025-07-28 ENCOUNTER — OFFICE VISIT (OUTPATIENT)
Dept: URGENT CARE | Facility: CLINIC | Age: 40
End: 2025-07-28
Payer: COMMERCIAL

## 2025-07-28 VITALS
HEIGHT: 60 IN | BODY MASS INDEX: 28.07 KG/M2 | HEART RATE: 76 BPM | DIASTOLIC BLOOD PRESSURE: 98 MMHG | SYSTOLIC BLOOD PRESSURE: 150 MMHG | WEIGHT: 143 LBS | RESPIRATION RATE: 18 BRPM | OXYGEN SATURATION: 100 % | TEMPERATURE: 98.6 F

## 2025-07-28 DIAGNOSIS — I10 ESSENTIAL HYPERTENSION: Primary | ICD-10-CM

## 2025-07-28 PROCEDURE — G0382 LEV 3 HOSP TYPE B ED VISIT: HCPCS | Performed by: PHYSICIAN ASSISTANT

## 2025-07-29 ENCOUNTER — DOCUMENTATION (OUTPATIENT)
Dept: ADMINISTRATIVE | Facility: OTHER | Age: 40
End: 2025-07-29

## 2025-07-31 ENCOUNTER — OFFICE VISIT (OUTPATIENT)
Dept: FAMILY MEDICINE CLINIC | Facility: CLINIC | Age: 40
End: 2025-07-31
Payer: COMMERCIAL

## 2025-07-31 VITALS
RESPIRATION RATE: 16 BRPM | TEMPERATURE: 97.6 F | HEIGHT: 61 IN | DIASTOLIC BLOOD PRESSURE: 90 MMHG | SYSTOLIC BLOOD PRESSURE: 150 MMHG | HEART RATE: 76 BPM | BODY MASS INDEX: 27.98 KG/M2 | WEIGHT: 148.2 LBS | OXYGEN SATURATION: 98 %

## 2025-07-31 DIAGNOSIS — Z13.1 SCREENING FOR DIABETES MELLITUS: ICD-10-CM

## 2025-07-31 DIAGNOSIS — E78.41 ELEVATED LIPOPROTEIN(A): ICD-10-CM

## 2025-07-31 DIAGNOSIS — Z00.00 ANNUAL PHYSICAL EXAM: Primary | ICD-10-CM

## 2025-07-31 DIAGNOSIS — Z13.6 SCREENING FOR CARDIOVASCULAR CONDITION: ICD-10-CM

## 2025-07-31 DIAGNOSIS — I10 PRIMARY HYPERTENSION: ICD-10-CM

## 2025-07-31 PROCEDURE — 99395 PREV VISIT EST AGE 18-39: CPT | Performed by: FAMILY MEDICINE

## 2025-07-31 RX ORDER — LOSARTAN POTASSIUM 25 MG/1
25 TABLET ORAL DAILY
Qty: 30 TABLET | Refills: 5 | Status: SHIPPED | OUTPATIENT
Start: 2025-07-31

## 2025-07-31 RX ORDER — ROSUVASTATIN CALCIUM 10 MG/1
10 TABLET, COATED ORAL DAILY
Qty: 90 TABLET | Refills: 1 | Status: SHIPPED | OUTPATIENT
Start: 2025-07-31